# Patient Record
Sex: FEMALE | Race: WHITE | NOT HISPANIC OR LATINO | Employment: FULL TIME | URBAN - METROPOLITAN AREA
[De-identification: names, ages, dates, MRNs, and addresses within clinical notes are randomized per-mention and may not be internally consistent; named-entity substitution may affect disease eponyms.]

---

## 2019-04-17 ENCOUNTER — HOSPITAL ENCOUNTER (OUTPATIENT)
Dept: PREADMISSION TESTING | Facility: HOSPITAL | Age: 54
Discharge: HOME | End: 2019-04-17
Attending: PODIATRIST | Admitting: PODIATRIST

## 2019-04-17 LAB
ALBUMIN SERPL-MCNC: 4 G/DL (ref 3.5–4.8)
ALBUMIN/GLOB SERPL: 1.4 {RATIO} (ref 1–1.7)
ALP SERPL-CCNC: 107 IU/L (ref 32–91)
ALT SERPL-CCNC: 28 IU/L (ref 14–54)
ANION GAP SERPL CALC-SCNC: 16.4 MMOL/L (ref 10–20)
AST SERPL-CCNC: 32 IU/L (ref 15–41)
BASOPHILS # BLD AUTO: 0.1 10*3/UL (ref 0–0.2)
BASOPHILS NFR BLD AUTO: 1 % (ref 0–2)
BILIRUB SERPL-MCNC: 0.6 MG/DL (ref 0.3–1.2)
BUN SERPL-MCNC: 16 MG/DL (ref 8–20)
BUN/CREAT SERPL: 13.3 (ref 5.4–26.2)
CALCIUM SERPL-MCNC: 9.7 MG/DL (ref 8.9–10.3)
CHLORIDE SERPL-SCNC: 104 MMOL/L (ref 101–111)
CONV CO2: 23 MMOL/L (ref 22–32)
CONV TOTAL PROTEIN: 6.9 G/DL (ref 6.1–7.9)
CREAT UR-MCNC: 1.2 MG/DL (ref 0.4–1)
DIFFERENTIAL METHOD BLD: (no result)
EOSINOPHIL # BLD AUTO: 0 10*3/UL (ref 0–0.3)
EOSINOPHIL # BLD AUTO: 1 % (ref 0–3)
ERYTHROCYTE [DISTWIDTH] IN BLOOD BY AUTOMATED COUNT: 14.6 % (ref 11.5–14.5)
GLOBULIN UR ELPH-MCNC: 2.9 G/DL (ref 2.5–3.8)
GLUCOSE SERPL-MCNC: 104 MG/DL (ref 65–99)
HCT VFR BLD AUTO: 38.1 % (ref 35–49)
HGB BLD-MCNC: 12.6 G/DL (ref 12–15)
LYMPHOCYTES # BLD AUTO: 2.5 10*3/UL (ref 0.8–4.8)
LYMPHOCYTES NFR BLD AUTO: 29 % (ref 18–42)
MCH RBC QN AUTO: 31.6 PG (ref 26–32)
MCHC RBC AUTO-ENTMCNC: 32.9 G/DL (ref 32–36)
MCV RBC AUTO: 95.8 FL (ref 80–94)
MONOCYTES # BLD AUTO: 0.8 10*3/UL (ref 0.1–1.3)
MONOCYTES NFR BLD AUTO: 9 % (ref 2–11)
NEUTROPHILS # BLD AUTO: 5.3 10*3/UL (ref 2.3–8.6)
NEUTROPHILS NFR BLD AUTO: 60 % (ref 50–75)
NRBC BLD AUTO-RTO: 0 /100{WBCS}
NRBC/RBC NFR BLD MANUAL: 0 10*3/UL
PLATELET # BLD AUTO: 332 10*3/UL (ref 150–450)
PMV BLD AUTO: 8.4 FL (ref 7.4–10.4)
POTASSIUM SERPL-SCNC: 4.4 MMOL/L (ref 3.6–5.1)
RBC # BLD AUTO: 3.98 10*6/UL (ref 4–5.4)
SODIUM SERPL-SCNC: 139 MMOL/L (ref 136–144)
WBC # BLD AUTO: 8.8 10*3/UL (ref 4.5–11.5)

## 2019-04-19 ENCOUNTER — HOSPITAL ENCOUNTER (OUTPATIENT)
Dept: PREOP | Facility: HOSPITAL | Age: 54
Setting detail: HOSPITAL OUTPATIENT SURGERY
Discharge: HOME | End: 2019-04-19
Attending: PODIATRIST | Admitting: PODIATRIST

## 2019-04-22 LAB
BLD COMPONENT TYPE: NORMAL
BLD COMPONENT TYPE: NORMAL
BPU ID: NORMAL
CONV PRODUCT 1 STATUS: NORMAL
NUM BPU REQUESTED: 1
TRANS STATUS: NORMAL
UNIT DIVISION: 0

## 2020-11-16 PROCEDURE — U0003 INFECTIOUS AGENT DETECTION BY NUCLEIC ACID (DNA OR RNA); SEVERE ACUTE RESPIRATORY SYNDROME CORONAVIRUS 2 (SARS-COV-2) (CORONAVIRUS DISEASE [COVID-19]), AMPLIFIED PROBE TECHNIQUE, MAKING USE OF HIGH THROUGHPUT TECHNOLOGIES AS DESCRIBED BY CMS-2020-01-R: HCPCS | Performed by: NURSE PRACTITIONER

## 2020-12-28 ENCOUNTER — HOSPITAL ENCOUNTER (INPATIENT)
Facility: HOSPITAL | Age: 55
LOS: 2 days | Discharge: HOME | End: 2020-12-30
Attending: EMERGENCY MEDICINE | Admitting: INTERNAL MEDICINE

## 2020-12-28 ENCOUNTER — APPOINTMENT (OUTPATIENT)
Dept: CT IMAGING | Facility: HOSPITAL | Age: 55
End: 2020-12-28

## 2020-12-28 DIAGNOSIS — R06.00 DYSPNEA, UNSPECIFIED TYPE: Primary | ICD-10-CM

## 2020-12-28 DIAGNOSIS — I26.99 ACUTE PULMONARY EMBOLISM WITHOUT ACUTE COR PULMONALE, UNSPECIFIED PULMONARY EMBOLISM TYPE (HCC): ICD-10-CM

## 2020-12-28 PROBLEM — E66.01 MORBID OBESITY: Chronic | Status: ACTIVE | Noted: 2020-12-28

## 2020-12-28 PROBLEM — J96.01 ACUTE RESPIRATORY FAILURE WITH HYPOXIA: Status: ACTIVE | Noted: 2020-12-28

## 2020-12-28 LAB
ANION GAP SERPL CALCULATED.3IONS-SCNC: 14 MMOL/L (ref 5–15)
ARTERIAL PATENCY WRIST A: POSITIVE
ATMOSPHERIC PRESS: ABNORMAL MM[HG]
BASE EXCESS BLDA CALC-SCNC: 0 MMOL/L (ref 0–3)
BASOPHILS # BLD AUTO: 0.1 10*3/MM3 (ref 0–0.2)
BASOPHILS NFR BLD AUTO: 0.9 % (ref 0–1.5)
BDY SITE: ABNORMAL
BUN SERPL-MCNC: 21 MG/DL (ref 6–20)
BUN/CREAT SERPL: 17.6 (ref 7–25)
CALCIUM SPEC-SCNC: 9.8 MG/DL (ref 8.6–10.5)
CHLORIDE SERPL-SCNC: 96 MMOL/L (ref 98–107)
CO2 BLDA-SCNC: 25.1 MMOL/L (ref 22–29)
CO2 SERPL-SCNC: 25 MMOL/L (ref 22–29)
CREAT SERPL-MCNC: 1.19 MG/DL (ref 0.57–1)
DEPRECATED RDW RBC AUTO: 45.9 FL (ref 37–54)
EOSINOPHIL # BLD AUTO: 0.1 10*3/MM3 (ref 0–0.4)
EOSINOPHIL NFR BLD AUTO: 1.2 % (ref 0.3–6.2)
ERYTHROCYTE [DISTWIDTH] IN BLOOD BY AUTOMATED COUNT: 14.4 % (ref 12.3–15.4)
GFR SERPL CREATININE-BSD FRML MDRD: 47 ML/MIN/1.73
GLUCOSE SERPL-MCNC: 108 MG/DL (ref 65–99)
HCO3 BLDA-SCNC: 24 MMOL/L (ref 21–28)
HCT VFR BLD AUTO: 40.7 % (ref 34–46.6)
HEMODILUTION: NO
HGB BLD-MCNC: 13.6 G/DL (ref 12–15.9)
HOLD SPECIMEN: NORMAL
INHALED O2 CONCENTRATION: 28 %
LYMPHOCYTES # BLD AUTO: 2.9 10*3/MM3 (ref 0.7–3.1)
LYMPHOCYTES NFR BLD AUTO: 26.5 % (ref 19.6–45.3)
MCH RBC QN AUTO: 30.8 PG (ref 26.6–33)
MCHC RBC AUTO-ENTMCNC: 33.3 G/DL (ref 31.5–35.7)
MCV RBC AUTO: 92.4 FL (ref 79–97)
MODALITY: ABNORMAL
MONOCYTES # BLD AUTO: 0.8 10*3/MM3 (ref 0.1–0.9)
MONOCYTES NFR BLD AUTO: 7.6 % (ref 5–12)
NEUTROPHILS NFR BLD AUTO: 6.9 10*3/MM3 (ref 1.7–7)
NEUTROPHILS NFR BLD AUTO: 63.8 % (ref 42.7–76)
NRBC BLD AUTO-RTO: 0.1 /100 WBC (ref 0–0.2)
NT-PROBNP SERPL-MCNC: 2895 PG/ML (ref 0–900)
PCO2 BLDA: 36.3 MM HG (ref 35–48)
PH BLDA: 7.43 PH UNITS (ref 7.35–7.45)
PLATELET # BLD AUTO: 289 10*3/MM3 (ref 140–450)
PMV BLD AUTO: 8 FL (ref 6–12)
PO2 BLDA: 73.6 MM HG (ref 83–108)
POTASSIUM SERPL-SCNC: 4.5 MMOL/L (ref 3.5–5.2)
RBC # BLD AUTO: 4.4 10*6/MM3 (ref 3.77–5.28)
SAO2 % BLDCOA: 95.1 % (ref 94–98)
SODIUM SERPL-SCNC: 135 MMOL/L (ref 136–145)
TROPONIN T SERPL-MCNC: <0.01 NG/ML (ref 0–0.03)
TROPONIN T SERPL-MCNC: <0.01 NG/ML (ref 0–0.03)
WBC # BLD AUTO: 10.8 10*3/MM3 (ref 3.4–10.8)
WHOLE BLOOD HOLD SPECIMEN: NORMAL

## 2020-12-28 PROCEDURE — 84484 ASSAY OF TROPONIN QUANT: CPT | Performed by: NURSE PRACTITIONER

## 2020-12-28 PROCEDURE — 36600 WITHDRAWAL OF ARTERIAL BLOOD: CPT

## 2020-12-28 PROCEDURE — 25010000002 ENOXAPARIN PER 10 MG: Performed by: EMERGENCY MEDICINE

## 2020-12-28 PROCEDURE — 0 IOPAMIDOL PER 1 ML: Performed by: EMERGENCY MEDICINE

## 2020-12-28 PROCEDURE — 93005 ELECTROCARDIOGRAM TRACING: CPT | Performed by: EMERGENCY MEDICINE

## 2020-12-28 PROCEDURE — 94799 UNLISTED PULMONARY SVC/PX: CPT

## 2020-12-28 PROCEDURE — 83880 ASSAY OF NATRIURETIC PEPTIDE: CPT | Performed by: EMERGENCY MEDICINE

## 2020-12-28 PROCEDURE — 84484 ASSAY OF TROPONIN QUANT: CPT | Performed by: EMERGENCY MEDICINE

## 2020-12-28 PROCEDURE — 99284 EMERGENCY DEPT VISIT MOD MDM: CPT

## 2020-12-28 PROCEDURE — 94640 AIRWAY INHALATION TREATMENT: CPT

## 2020-12-28 PROCEDURE — 82803 BLOOD GASES ANY COMBINATION: CPT

## 2020-12-28 PROCEDURE — 71275 CT ANGIOGRAPHY CHEST: CPT

## 2020-12-28 PROCEDURE — 80048 BASIC METABOLIC PNL TOTAL CA: CPT | Performed by: EMERGENCY MEDICINE

## 2020-12-28 PROCEDURE — 94760 N-INVAS EAR/PLS OXIMETRY 1: CPT

## 2020-12-28 PROCEDURE — 85025 COMPLETE CBC W/AUTO DIFF WBC: CPT | Performed by: EMERGENCY MEDICINE

## 2020-12-28 PROCEDURE — 99223 1ST HOSP IP/OBS HIGH 75: CPT | Performed by: INTERNAL MEDICINE

## 2020-12-28 RX ORDER — ACETAMINOPHEN 160 MG/5ML
650 SOLUTION ORAL EVERY 4 HOURS PRN
Status: DISCONTINUED | OUTPATIENT
Start: 2020-12-28 | End: 2020-12-30 | Stop reason: HOSPADM

## 2020-12-28 RX ORDER — METOPROLOL TARTRATE 50 MG/1
50 TABLET, FILM COATED ORAL EVERY 12 HOURS SCHEDULED
Status: DISCONTINUED | OUTPATIENT
Start: 2020-12-28 | End: 2020-12-30 | Stop reason: HOSPADM

## 2020-12-28 RX ORDER — ONDANSETRON 2 MG/ML
4 INJECTION INTRAMUSCULAR; INTRAVENOUS EVERY 6 HOURS PRN
Status: DISCONTINUED | OUTPATIENT
Start: 2020-12-28 | End: 2020-12-30 | Stop reason: HOSPADM

## 2020-12-28 RX ORDER — ALBUTEROL SULFATE 90 UG/1
2 AEROSOL, METERED RESPIRATORY (INHALATION) EVERY 4 HOURS PRN
COMMUNITY

## 2020-12-28 RX ORDER — SODIUM CHLORIDE 0.9 % (FLUSH) 0.9 %
10 SYRINGE (ML) INJECTION AS NEEDED
Status: DISCONTINUED | OUTPATIENT
Start: 2020-12-28 | End: 2020-12-30 | Stop reason: HOSPADM

## 2020-12-28 RX ORDER — NITROGLYCERIN 0.4 MG/1
0.4 TABLET SUBLINGUAL
Status: DISCONTINUED | OUTPATIENT
Start: 2020-12-28 | End: 2020-12-30 | Stop reason: HOSPADM

## 2020-12-28 RX ORDER — CHOLECALCIFEROL (VITAMIN D3) 125 MCG
5 CAPSULE ORAL NIGHTLY PRN
Status: DISCONTINUED | OUTPATIENT
Start: 2020-12-28 | End: 2020-12-30 | Stop reason: HOSPADM

## 2020-12-28 RX ORDER — FLUOXETINE HYDROCHLORIDE 20 MG/1
40 CAPSULE ORAL DAILY
Status: DISCONTINUED | OUTPATIENT
Start: 2020-12-28 | End: 2020-12-30 | Stop reason: HOSPADM

## 2020-12-28 RX ORDER — MAGNESIUM SULFATE 1 G/100ML
1 INJECTION INTRAVENOUS AS NEEDED
Status: DISCONTINUED | OUTPATIENT
Start: 2020-12-28 | End: 2020-12-30 | Stop reason: HOSPADM

## 2020-12-28 RX ORDER — ALUMINA, MAGNESIA, AND SIMETHICONE 2400; 2400; 240 MG/30ML; MG/30ML; MG/30ML
15 SUSPENSION ORAL EVERY 6 HOURS PRN
Status: DISCONTINUED | OUTPATIENT
Start: 2020-12-28 | End: 2020-12-30 | Stop reason: HOSPADM

## 2020-12-28 RX ORDER — MAGNESIUM SULFATE HEPTAHYDRATE 40 MG/ML
2 INJECTION, SOLUTION INTRAVENOUS AS NEEDED
Status: DISCONTINUED | OUTPATIENT
Start: 2020-12-28 | End: 2020-12-30 | Stop reason: HOSPADM

## 2020-12-28 RX ORDER — AMLODIPINE, VALSARTAN AND HYDROCHLOROTHIAZIDE 10; 320; 25 MG/1; MG/1; MG/1
1 TABLET ORAL DAILY
COMMUNITY
End: 2021-09-10 | Stop reason: HOSPADM

## 2020-12-28 RX ORDER — ACETAMINOPHEN 325 MG/1
650 TABLET ORAL EVERY 4 HOURS PRN
Status: DISCONTINUED | OUTPATIENT
Start: 2020-12-28 | End: 2020-12-30 | Stop reason: HOSPADM

## 2020-12-28 RX ORDER — ONDANSETRON 4 MG/1
4 TABLET, FILM COATED ORAL EVERY 6 HOURS PRN
Status: DISCONTINUED | OUTPATIENT
Start: 2020-12-28 | End: 2020-12-30 | Stop reason: HOSPADM

## 2020-12-28 RX ORDER — ALBUTEROL SULFATE 90 UG/1
2 AEROSOL, METERED RESPIRATORY (INHALATION) EVERY 4 HOURS PRN
Status: DISCONTINUED | OUTPATIENT
Start: 2020-12-28 | End: 2020-12-30 | Stop reason: HOSPADM

## 2020-12-28 RX ORDER — POTASSIUM CHLORIDE 20 MEQ/1
40 TABLET, EXTENDED RELEASE ORAL AS NEEDED
Status: DISCONTINUED | OUTPATIENT
Start: 2020-12-28 | End: 2020-12-30 | Stop reason: HOSPADM

## 2020-12-28 RX ORDER — MONTELUKAST SODIUM 10 MG/1
10 TABLET ORAL NIGHTLY
Status: DISCONTINUED | OUTPATIENT
Start: 2020-12-28 | End: 2020-12-30 | Stop reason: HOSPADM

## 2020-12-28 RX ORDER — BISACODYL 10 MG
10 SUPPOSITORY, RECTAL RECTAL DAILY PRN
Status: DISCONTINUED | OUTPATIENT
Start: 2020-12-28 | End: 2020-12-30 | Stop reason: HOSPADM

## 2020-12-28 RX ORDER — LEVOTHYROXINE SODIUM 112 UG/1
112 TABLET ORAL
Status: DISCONTINUED | OUTPATIENT
Start: 2020-12-29 | End: 2020-12-30 | Stop reason: HOSPADM

## 2020-12-28 RX ORDER — ACETAMINOPHEN 650 MG/1
650 SUPPOSITORY RECTAL EVERY 4 HOURS PRN
Status: DISCONTINUED | OUTPATIENT
Start: 2020-12-28 | End: 2020-12-30 | Stop reason: HOSPADM

## 2020-12-28 RX ORDER — SODIUM CHLORIDE 0.9 % (FLUSH) 0.9 %
10 SYRINGE (ML) INJECTION EVERY 12 HOURS SCHEDULED
Status: DISCONTINUED | OUTPATIENT
Start: 2020-12-28 | End: 2020-12-30 | Stop reason: HOSPADM

## 2020-12-28 RX ORDER — BUDESONIDE AND FORMOTEROL FUMARATE DIHYDRATE 160; 4.5 UG/1; UG/1
2 AEROSOL RESPIRATORY (INHALATION)
Status: DISCONTINUED | OUTPATIENT
Start: 2020-12-28 | End: 2020-12-30 | Stop reason: HOSPADM

## 2020-12-28 RX ADMIN — BUDESONIDE AND FORMOTEROL FUMARATE DIHYDRATE 2 PUFF: 160; 4.5 AEROSOL RESPIRATORY (INHALATION) at 18:24

## 2020-12-28 RX ADMIN — ENOXAPARIN SODIUM 120 MG: 120 INJECTION SUBCUTANEOUS at 15:02

## 2020-12-28 RX ADMIN — FLUOXETINE 40 MG: 20 CAPSULE ORAL at 22:11

## 2020-12-28 RX ADMIN — Medication 10 ML: at 22:06

## 2020-12-28 RX ADMIN — METOPROLOL TARTRATE 50 MG: 50 TABLET, FILM COATED ORAL at 22:00

## 2020-12-28 RX ADMIN — MONTELUKAST 10 MG: 10 TABLET, FILM COATED ORAL at 22:00

## 2020-12-28 RX ADMIN — IOPAMIDOL 100 ML: 755 INJECTION, SOLUTION INTRAVENOUS at 13:07

## 2020-12-29 ENCOUNTER — APPOINTMENT (OUTPATIENT)
Dept: CARDIOLOGY | Facility: HOSPITAL | Age: 55
End: 2020-12-29

## 2020-12-29 LAB
ANION GAP SERPL CALCULATED.3IONS-SCNC: 12 MMOL/L (ref 5–15)
BASOPHILS # BLD AUTO: 0 10*3/MM3 (ref 0–0.2)
BASOPHILS NFR BLD AUTO: 0.5 % (ref 0–1.5)
BH CV ECHO MEAS - % IVS THICK: 34.7 %
BH CV ECHO MEAS - % LVPW THICK: 25.2 %
BH CV ECHO MEAS - ACS: 1.9 CM
BH CV ECHO MEAS - AO MAX PG (FULL): 3.7 MMHG
BH CV ECHO MEAS - AO MAX PG: 10.7 MMHG
BH CV ECHO MEAS - AO MEAN PG (FULL): 2.8 MMHG
BH CV ECHO MEAS - AO MEAN PG: 6.1 MMHG
BH CV ECHO MEAS - AO ROOT AREA (BSA CORRECTED): 1.2
BH CV ECHO MEAS - AO ROOT AREA: 5.6 CM^2
BH CV ECHO MEAS - AO ROOT DIAM: 2.7 CM
BH CV ECHO MEAS - AO V2 MAX: 163.4 CM/SEC
BH CV ECHO MEAS - AO V2 MEAN: 117.9 CM/SEC
BH CV ECHO MEAS - AO V2 VTI: 29.8 CM
BH CV ECHO MEAS - AVA(I,A): 2.7 CM^2
BH CV ECHO MEAS - AVA(I,D): 2.7 CM^2
BH CV ECHO MEAS - AVA(V,A): 2.6 CM^2
BH CV ECHO MEAS - AVA(V,D): 2.6 CM^2
BH CV ECHO MEAS - BSA(HAYCOCK): 2.4 M^2
BH CV ECHO MEAS - BSA: 2.2 M^2
BH CV ECHO MEAS - BZI_BMI: 42.4 KILOGRAMS/M^2
BH CV ECHO MEAS - BZI_METRIC_HEIGHT: 167.6 CM
BH CV ECHO MEAS - BZI_METRIC_WEIGHT: 119.3 KG
BH CV ECHO MEAS - EDV(CUBED): 121.3 ML
BH CV ECHO MEAS - EDV(MOD-SP4): 62.7 ML
BH CV ECHO MEAS - EDV(TEICH): 115.6 ML
BH CV ECHO MEAS - EF(CUBED): 77.7 %
BH CV ECHO MEAS - EF(MOD-BP): 69 %
BH CV ECHO MEAS - EF(MOD-SP4): 78.7 %
BH CV ECHO MEAS - EF(TEICH): 69.7 %
BH CV ECHO MEAS - ESV(CUBED): 27 ML
BH CV ECHO MEAS - ESV(MOD-SP4): 13.4 ML
BH CV ECHO MEAS - ESV(TEICH): 35 ML
BH CV ECHO MEAS - FS: 39.4 %
BH CV ECHO MEAS - IVS/LVPW: 1.1
BH CV ECHO MEAS - IVSD: 1.4 CM
BH CV ECHO MEAS - IVSS: 1.9 CM
BH CV ECHO MEAS - LA DIMENSION(2D): 4.4 CM
BH CV ECHO MEAS - LV DIASTOLIC VOL/BSA (35-75): 27.9 ML/M^2
BH CV ECHO MEAS - LV MASS(C)D: 280.5 GRAMS
BH CV ECHO MEAS - LV MASS(C)DI: 124.8 GRAMS/M^2
BH CV ECHO MEAS - LV MASS(C)S: 216.2 GRAMS
BH CV ECHO MEAS - LV MASS(C)SI: 96.2 GRAMS/M^2
BH CV ECHO MEAS - LV MAX PG: 7 MMHG
BH CV ECHO MEAS - LV MEAN PG: 3.3 MMHG
BH CV ECHO MEAS - LV SYSTOLIC VOL/BSA (12-30): 6 ML/M^2
BH CV ECHO MEAS - LV V1 MAX: 131.9 CM/SEC
BH CV ECHO MEAS - LV V1 MEAN: 84 CM/SEC
BH CV ECHO MEAS - LV V1 VTI: 25.9 CM
BH CV ECHO MEAS - LVIDD: 5 CM
BH CV ECHO MEAS - LVIDS: 3 CM
BH CV ECHO MEAS - LVOT AREA: 3.2 CM^2
BH CV ECHO MEAS - LVOT DIAM: 2 CM
BH CV ECHO MEAS - LVPWD: 1.3 CM
BH CV ECHO MEAS - LVPWS: 1.6 CM
BH CV ECHO MEAS - MV A MAX VEL: 93.2 CM/SEC
BH CV ECHO MEAS - MV DEC SLOPE: 256.3 CM/SEC^2
BH CV ECHO MEAS - MV DEC TIME: 0.29 SEC
BH CV ECHO MEAS - MV E MAX VEL: 73.6 CM/SEC
BH CV ECHO MEAS - MV E/A: 0.79
BH CV ECHO MEAS - MV MAX PG: 3.5 MMHG
BH CV ECHO MEAS - MV MEAN PG: 1.3 MMHG
BH CV ECHO MEAS - MV V2 MAX: 93.2 CM/SEC
BH CV ECHO MEAS - MV V2 MEAN: 54 CM/SEC
BH CV ECHO MEAS - MV V2 VTI: 23.2 CM
BH CV ECHO MEAS - MVA(VTI): 3.5 CM^2
BH CV ECHO MEAS - PA ACC TIME: 0.06 SEC
BH CV ECHO MEAS - PA MAX PG (FULL): 1.9 MMHG
BH CV ECHO MEAS - PA MAX PG: 3.1 MMHG
BH CV ECHO MEAS - PA MEAN PG (FULL): 0.83 MMHG
BH CV ECHO MEAS - PA MEAN PG: 1.5 MMHG
BH CV ECHO MEAS - PA PR(ACCEL): 54.1 MMHG
BH CV ECHO MEAS - PA V2 MAX: 88.1 CM/SEC
BH CV ECHO MEAS - PA V2 MEAN: 57.6 CM/SEC
BH CV ECHO MEAS - PA V2 VTI: 15.5 CM
BH CV ECHO MEAS - PULM A REVS DUR: 0.1 SEC
BH CV ECHO MEAS - PULM A REVS VEL: 31.8 CM/SEC
BH CV ECHO MEAS - PULM DIAS VEL: 38.1 CM/SEC
BH CV ECHO MEAS - PULM S/D: 1.2
BH CV ECHO MEAS - PULM SYS VEL: 47 CM/SEC
BH CV ECHO MEAS - PVA(I,A): 3.5 CM^2
BH CV ECHO MEAS - PVA(I,D): 3.5 CM^2
BH CV ECHO MEAS - PVA(V,A): 3 CM^2
BH CV ECHO MEAS - PVA(V,D): 3 CM^2
BH CV ECHO MEAS - QP/QS: 0.67
BH CV ECHO MEAS - RAP SYSTOLE: 8 MMHG
BH CV ECHO MEAS - RV MAX PG: 1.2 MMHG
BH CV ECHO MEAS - RV MEAN PG: 0.66 MMHG
BH CV ECHO MEAS - RV V1 MAX: 54.3 CM/SEC
BH CV ECHO MEAS - RV V1 MEAN: 39 CM/SEC
BH CV ECHO MEAS - RV V1 VTI: 11.2 CM
BH CV ECHO MEAS - RVDD: 3.1 CM
BH CV ECHO MEAS - RVOT AREA: 4.9 CM^2
BH CV ECHO MEAS - RVOT DIAM: 2.5 CM
BH CV ECHO MEAS - RVSP: 37.2 MMHG
BH CV ECHO MEAS - SI(AO): 74.7 ML/M^2
BH CV ECHO MEAS - SI(CUBED): 42 ML/M^2
BH CV ECHO MEAS - SI(LVOT): 36.4 ML/M^2
BH CV ECHO MEAS - SI(MOD-SP4): 22 ML/M^2
BH CV ECHO MEAS - SI(TEICH): 35.8 ML/M^2
BH CV ECHO MEAS - SV(AO): 167.9 ML
BH CV ECHO MEAS - SV(CUBED): 94.3 ML
BH CV ECHO MEAS - SV(LVOT): 81.8 ML
BH CV ECHO MEAS - SV(MOD-SP4): 49.4 ML
BH CV ECHO MEAS - SV(RVOT): 54.7 ML
BH CV ECHO MEAS - SV(TEICH): 80.5 ML
BH CV ECHO MEAS - TR MAX VEL: 268.6 CM/SEC
BUN SERPL-MCNC: 17 MG/DL (ref 6–20)
BUN/CREAT SERPL: 16.7 (ref 7–25)
CALCIUM SPEC-SCNC: 9.4 MG/DL (ref 8.6–10.5)
CHLORIDE SERPL-SCNC: 100 MMOL/L (ref 98–107)
CO2 SERPL-SCNC: 26 MMOL/L (ref 22–29)
CREAT SERPL-MCNC: 1.02 MG/DL (ref 0.57–1)
DEPRECATED RDW RBC AUTO: 47.7 FL (ref 37–54)
EOSINOPHIL # BLD AUTO: 0.2 10*3/MM3 (ref 0–0.4)
EOSINOPHIL NFR BLD AUTO: 1.8 % (ref 0.3–6.2)
ERYTHROCYTE [DISTWIDTH] IN BLOOD BY AUTOMATED COUNT: 14.7 % (ref 12.3–15.4)
GFR SERPL CREATININE-BSD FRML MDRD: 56 ML/MIN/1.73
GLUCOSE SERPL-MCNC: 102 MG/DL (ref 65–99)
HCT VFR BLD AUTO: 37.7 % (ref 34–46.6)
HGB BLD-MCNC: 12.5 G/DL (ref 12–15.9)
LYMPHOCYTES # BLD AUTO: 2.7 10*3/MM3 (ref 0.7–3.1)
LYMPHOCYTES NFR BLD AUTO: 31.8 % (ref 19.6–45.3)
MAGNESIUM SERPL-MCNC: 2.2 MG/DL (ref 1.6–2.6)
MAXIMAL PREDICTED HEART RATE: 163 BPM
MCH RBC QN AUTO: 30.9 PG (ref 26.6–33)
MCHC RBC AUTO-ENTMCNC: 33.2 G/DL (ref 31.5–35.7)
MCV RBC AUTO: 93.1 FL (ref 79–97)
MONOCYTES # BLD AUTO: 0.8 10*3/MM3 (ref 0.1–0.9)
MONOCYTES NFR BLD AUTO: 10 % (ref 5–12)
NEUTROPHILS NFR BLD AUTO: 4.7 10*3/MM3 (ref 1.7–7)
NEUTROPHILS NFR BLD AUTO: 55.9 % (ref 42.7–76)
NRBC BLD AUTO-RTO: 0.2 /100 WBC (ref 0–0.2)
PLATELET # BLD AUTO: 247 10*3/MM3 (ref 140–450)
PMV BLD AUTO: 8.7 FL (ref 6–12)
POTASSIUM SERPL-SCNC: 4.1 MMOL/L (ref 3.5–5.2)
RBC # BLD AUTO: 4.05 10*6/MM3 (ref 3.77–5.28)
SARS-COV-2 ORF1AB RESP QL NAA+PROBE: NOT DETECTED
SODIUM SERPL-SCNC: 138 MMOL/L (ref 136–145)
STRESS TARGET HR: 139 BPM
TROPONIN T SERPL-MCNC: 0.01 NG/ML (ref 0–0.03)
WBC # BLD AUTO: 8.5 10*3/MM3 (ref 3.4–10.8)

## 2020-12-29 PROCEDURE — 25010000002 ENOXAPARIN PER 10 MG: Performed by: NURSE PRACTITIONER

## 2020-12-29 PROCEDURE — 93306 TTE W/DOPPLER COMPLETE: CPT | Performed by: INTERNAL MEDICINE

## 2020-12-29 PROCEDURE — 94799 UNLISTED PULMONARY SVC/PX: CPT

## 2020-12-29 PROCEDURE — 83735 ASSAY OF MAGNESIUM: CPT | Performed by: NURSE PRACTITIONER

## 2020-12-29 PROCEDURE — 99233 SBSQ HOSP IP/OBS HIGH 50: CPT | Performed by: INTERNAL MEDICINE

## 2020-12-29 PROCEDURE — U0004 COV-19 TEST NON-CDC HGH THRU: HCPCS | Performed by: INTERNAL MEDICINE

## 2020-12-29 PROCEDURE — 85025 COMPLETE CBC W/AUTO DIFF WBC: CPT | Performed by: NURSE PRACTITIONER

## 2020-12-29 PROCEDURE — 80048 BASIC METABOLIC PNL TOTAL CA: CPT | Performed by: NURSE PRACTITIONER

## 2020-12-29 PROCEDURE — 93306 TTE W/DOPPLER COMPLETE: CPT

## 2020-12-29 RX ADMIN — Medication 10 ML: at 08:35

## 2020-12-29 RX ADMIN — ENOXAPARIN SODIUM 120 MG: 120 INJECTION SUBCUTANEOUS at 17:59

## 2020-12-29 RX ADMIN — LEVOTHYROXINE SODIUM 112 MCG: 0.11 TABLET ORAL at 05:14

## 2020-12-29 RX ADMIN — Medication 10 ML: at 20:44

## 2020-12-29 RX ADMIN — METOPROLOL TARTRATE 50 MG: 50 TABLET, FILM COATED ORAL at 08:36

## 2020-12-29 RX ADMIN — METOPROLOL TARTRATE 50 MG: 50 TABLET, FILM COATED ORAL at 20:44

## 2020-12-29 RX ADMIN — ENOXAPARIN SODIUM 120 MG: 120 INJECTION SUBCUTANEOUS at 05:14

## 2020-12-29 RX ADMIN — HYDROCHLOROTHIAZIDE: 25 TABLET ORAL at 08:35

## 2020-12-29 RX ADMIN — MONTELUKAST 10 MG: 10 TABLET, FILM COATED ORAL at 20:44

## 2020-12-29 RX ADMIN — FLUOXETINE 40 MG: 20 CAPSULE ORAL at 20:44

## 2020-12-29 RX ADMIN — BUDESONIDE AND FORMOTEROL FUMARATE DIHYDRATE 2 PUFF: 160; 4.5 AEROSOL RESPIRATORY (INHALATION) at 19:08

## 2020-12-29 RX ADMIN — BUDESONIDE AND FORMOTEROL FUMARATE DIHYDRATE 2 PUFF: 160; 4.5 AEROSOL RESPIRATORY (INHALATION) at 07:51

## 2020-12-30 ENCOUNTER — APPOINTMENT (OUTPATIENT)
Dept: CARDIOLOGY | Facility: HOSPITAL | Age: 55
End: 2020-12-30

## 2020-12-30 VITALS
HEART RATE: 58 BPM | OXYGEN SATURATION: 96 % | WEIGHT: 262.57 LBS | HEIGHT: 66 IN | TEMPERATURE: 97.7 F | DIASTOLIC BLOOD PRESSURE: 84 MMHG | RESPIRATION RATE: 16 BRPM | BODY MASS INDEX: 42.2 KG/M2 | SYSTOLIC BLOOD PRESSURE: 140 MMHG

## 2020-12-30 LAB
BH CV LOW VAS LEFT POPLITEAL SPONT: 1
BH CV LOWER VASCULAR LEFT COMMON FEMORAL AUGMENT: NORMAL
BH CV LOWER VASCULAR LEFT COMMON FEMORAL COMPETENT: NORMAL
BH CV LOWER VASCULAR LEFT COMMON FEMORAL COMPRESS: NORMAL
BH CV LOWER VASCULAR LEFT COMMON FEMORAL PHASIC: NORMAL
BH CV LOWER VASCULAR LEFT COMMON FEMORAL SPONT: NORMAL
BH CV LOWER VASCULAR LEFT DISTAL FEMORAL COMPRESS: NORMAL
BH CV LOWER VASCULAR LEFT GASTRONEMIUS COMPRESS: NORMAL
BH CV LOWER VASCULAR LEFT GREATER SAPH AK COMPRESS: NORMAL
BH CV LOWER VASCULAR LEFT GREATER SAPH BK COMPRESS: NORMAL
BH CV LOWER VASCULAR LEFT LESSER SAPH COMPRESS: NORMAL
BH CV LOWER VASCULAR LEFT MID FEMORAL AUGMENT: NORMAL
BH CV LOWER VASCULAR LEFT MID FEMORAL COMPETENT: NORMAL
BH CV LOWER VASCULAR LEFT MID FEMORAL COMPRESS: NORMAL
BH CV LOWER VASCULAR LEFT MID FEMORAL PHASIC: NORMAL
BH CV LOWER VASCULAR LEFT MID FEMORAL SPONT: NORMAL
BH CV LOWER VASCULAR LEFT PERONEAL COMPRESS: NORMAL
BH CV LOWER VASCULAR LEFT POPLITEAL AUGMENT: NORMAL
BH CV LOWER VASCULAR LEFT POPLITEAL COMPETENT: NORMAL
BH CV LOWER VASCULAR LEFT POPLITEAL COMPRESS: NORMAL
BH CV LOWER VASCULAR LEFT POPLITEAL PHASIC: NORMAL
BH CV LOWER VASCULAR LEFT POPLITEAL SPONT: NORMAL
BH CV LOWER VASCULAR LEFT POPLITEAL THROMBUS: NORMAL
BH CV LOWER VASCULAR LEFT POSTERIOR TIBIAL COMPRESS: NORMAL
BH CV LOWER VASCULAR LEFT PROXIMAL FEMORAL COMPRESS: NORMAL
BH CV LOWER VASCULAR LEFT SAPHENOFEMORAL JUNCTION COMPRESS: NORMAL
BH CV LOWER VASCULAR RIGHT COMMON FEMORAL AUGMENT: NORMAL
BH CV LOWER VASCULAR RIGHT COMMON FEMORAL COMPETENT: NORMAL
BH CV LOWER VASCULAR RIGHT COMMON FEMORAL COMPRESS: NORMAL
BH CV LOWER VASCULAR RIGHT COMMON FEMORAL PHASIC: NORMAL
BH CV LOWER VASCULAR RIGHT COMMON FEMORAL SPONT: NORMAL
BH CV LOWER VASCULAR RIGHT GASTRONEMIUS COMPRESS: NORMAL
BH CV LOWER VASCULAR RIGHT GREATER SAPH AK COMPRESS: NORMAL
BH CV LOWER VASCULAR RIGHT GREATER SAPH BK COMPRESS: NORMAL
BH CV LOWER VASCULAR RIGHT LESSER SAPH COMPRESS: NORMAL
BH CV LOWER VASCULAR RIGHT MID FEMORAL AUGMENT: NORMAL
BH CV LOWER VASCULAR RIGHT MID FEMORAL COMPETENT: NORMAL
BH CV LOWER VASCULAR RIGHT MID FEMORAL COMPRESS: NORMAL
BH CV LOWER VASCULAR RIGHT MID FEMORAL PHASIC: NORMAL
BH CV LOWER VASCULAR RIGHT MID FEMORAL SPONT: NORMAL
BH CV LOWER VASCULAR RIGHT PERONEAL COMPRESS: NORMAL
BH CV LOWER VASCULAR RIGHT POPLITEAL AUGMENT: NORMAL
BH CV LOWER VASCULAR RIGHT POPLITEAL COMPETENT: NORMAL
BH CV LOWER VASCULAR RIGHT POPLITEAL COMPRESS: NORMAL
BH CV LOWER VASCULAR RIGHT POPLITEAL PHASIC: NORMAL
BH CV LOWER VASCULAR RIGHT POPLITEAL SPONT: NORMAL
BH CV LOWER VASCULAR RIGHT POSTERIOR TIBIAL COMPRESS: NORMAL
BH CV LOWER VASCULAR RIGHT PROFUNDA FEMORAL COMPRESS: NORMAL
BH CV LOWER VASCULAR RIGHT PROXIMAL FEMORAL COMPRESS: NORMAL
BH CV LOWER VASCULAR RIGHT SAPHENOFEMORAL JUNCTION COMPRESS: NORMAL
MAGNESIUM SERPL-MCNC: 2.1 MG/DL (ref 1.6–2.6)
POTASSIUM SERPL-SCNC: 4 MMOL/L (ref 3.5–5.2)

## 2020-12-30 PROCEDURE — 83735 ASSAY OF MAGNESIUM: CPT | Performed by: NURSE PRACTITIONER

## 2020-12-30 PROCEDURE — 84132 ASSAY OF SERUM POTASSIUM: CPT | Performed by: NURSE PRACTITIONER

## 2020-12-30 PROCEDURE — 99239 HOSP IP/OBS DSCHRG MGMT >30: CPT | Performed by: INTERNAL MEDICINE

## 2020-12-30 PROCEDURE — 93970 EXTREMITY STUDY: CPT

## 2020-12-30 PROCEDURE — 94799 UNLISTED PULMONARY SVC/PX: CPT

## 2020-12-30 PROCEDURE — 25010000002 ENOXAPARIN PER 10 MG: Performed by: NURSE PRACTITIONER

## 2020-12-30 RX ORDER — RIVAROXABAN 15 MG-20MG
KIT ORAL
Qty: 60 TABLET | Refills: 3 | Status: SHIPPED | OUTPATIENT
Start: 2020-12-30 | End: 2021-07-26 | Stop reason: SDUPTHER

## 2020-12-30 RX ADMIN — METOPROLOL TARTRATE 50 MG: 50 TABLET, FILM COATED ORAL at 08:26

## 2020-12-30 RX ADMIN — HYDROCHLOROTHIAZIDE: 25 TABLET ORAL at 08:26

## 2020-12-30 RX ADMIN — ENOXAPARIN SODIUM 120 MG: 120 INJECTION SUBCUTANEOUS at 05:06

## 2020-12-30 RX ADMIN — LEVOTHYROXINE SODIUM 112 MCG: 0.11 TABLET ORAL at 05:06

## 2020-12-30 RX ADMIN — BUDESONIDE AND FORMOTEROL FUMARATE DIHYDRATE 2 PUFF: 160; 4.5 AEROSOL RESPIRATORY (INHALATION) at 08:00

## 2020-12-31 ENCOUNTER — READMISSION MANAGEMENT (OUTPATIENT)
Dept: CALL CENTER | Facility: HOSPITAL | Age: 55
End: 2020-12-31

## 2020-12-31 NOTE — OUTREACH NOTE
Prep Survey      Responses   [facility] patient discharged from?  [facility]   Is LACE score < 7 ?  No   Emergency Room discharge w/ pulse ox?  No   Eligibility  Readm Mgmt   Discharge diagnosis  Bilateral pulmonary embolism   Does the patient have one of the following disease processes/diagnoses(primary or secondary)?  Other   Does the patient have Home health ordered?  No   Is there a DME ordered?  No   Comments regarding appointments  Needs f/u scheduled   Medication alerts for this patient  start xarelto   Prep survey completed?  Yes          Lee Ann Yates RN

## 2021-01-02 LAB — QT INTERVAL: 424 MS

## 2021-01-05 ENCOUNTER — READMISSION MANAGEMENT (OUTPATIENT)
Dept: CALL CENTER | Facility: HOSPITAL | Age: 56
End: 2021-01-05

## 2021-01-05 NOTE — OUTREACH NOTE
Medical Week 1 Survey      Responses   [facility] patient discharged from?  [facility]   Does the patient have one of the following disease processes/diagnoses(primary or secondary)?  Other   Week 1 attempt successful?  No   Unsuccessful attempts  Attempt 1          Liane Taylor LPN

## 2021-01-08 ENCOUNTER — READMISSION MANAGEMENT (OUTPATIENT)
Dept: CALL CENTER | Facility: HOSPITAL | Age: 56
End: 2021-01-08

## 2021-01-08 NOTE — OUTREACH NOTE
Medical Week 1 Survey      Responses   [facility] [place] patient discharged from?  [facility]   Does the patient have one of the following disease processes/diagnoses(primary or secondary)?  Other   Week 1 attempt successful?  No   Unsuccessful attempts  Attempt 2          Liane Taylor LPN

## 2021-01-11 ENCOUNTER — READMISSION MANAGEMENT (OUTPATIENT)
Dept: CALL CENTER | Facility: HOSPITAL | Age: 56
End: 2021-01-11

## 2021-01-11 NOTE — PROGRESS NOTES
HEMATOLOGY ONCOLOGY CONSULTATION       Patient name: Danita Montiel  : 1965  MRN: [id number]  Primary Care Physician: Jaya Harper MD  Referring Physician: Jaya Harper MD  Reason For Consultation:   Appointment (Hematology)    History of Present Illness    Danita Montiel is 55 y.o. female who presented to our office on 21 for consultation regarding Pulmonary embolism and deep venous thrombosis.  Patient presented to the emergency room on 2020 with symptoms of increasing shortness of air of 1 week duration.  Patient was diagnosed with COVID-19 a month before the PE diagnosis.  CT scan was done and it showed extensive bilateral pulmonary emboli involving segmental and subsegmental pulmonary arteries.  There was a 1.4 cm noncalcified nodular density in the medial left lower lobe above the diaphragm which was new since .  CT chest PE protocol.    Patient also has a history of morbid obesity, asthma, GERD,Anxiety, hypertension, hypothyroidism, chronic kidney disease stage III.        · 2020 : 1. Extensive bilateral pulmonary emboli involving segmental and subsegmental pulmonary arteries. Pulmonary emboli are seen within all lobes of both lungs. No evidence of right heart strain. 1.4 cm noncalcified nodular density in the medial left lower lobe above the diaphragm, new since . This could represent focal pulmonary infarct or atelectasis. Consider short-term CT chest follow-up within 3-4 months to ensure improvement or resolution.  Questionable fibromuscular dysplasia of left renal artery, without high-grade stenosis. . Stable anterior mediastinal cyst.   · 2020: WBC 8.5, hemoglobin 12.5, platelets 247  · 2020: Doppler lower extremities: Acute left lower extremity DVT noted in the popliteal.  All other veins are normal.  · 2021: WBC 9.05, hemoglobin 12.7, platelets 315, MCV 93.9      Subjective   Subjective:  Danita Montiel presents to [facility] [place]  [facility] [place] for recent diagnosis of pulmonary embolism and left leg deep venous thrombosis.  Patient denies any immobilization, long road trips, flight units, hormone usage or any surgeries before her thrombosis diagnosis.  She was diagnosed with COVID-19 infection about a month before the diagnosis.  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  She denies any swelling in her left leg currently.  She does have lymphedema both extremities which she thinks is due to abdominal surgery when she was younger.  She had surgery for endometriosis.         Past Medical History:   Diagnosis Date   • Anxiety    • Asthma    • CKD (chronic kidney disease) stage 3, GFR 30-59 ml/min    • COVID-19 11/16/2020   • Endometriosis    • GERD (gastroesophageal reflux disease)    • Hypertension    • Hypothyroidism    • Obesity    • Ovarian cyst    Lymphedema more on the right side    Past Surgical History:   Procedure Laterality Date   • DIAGNOSTIC LAPAROSCOPY     • OVARIAN CYST DRAINAGE     Endometriosis      Current Outpatient Medications:   •  albuterol sulfate  (90 Base) MCG/ACT inhaler, Inhale 2 puffs Every 4 (Four) Hours As Needed for Wheezing., Disp: , Rfl:   •  amLODIPine-Valsartan-HCTZ (Exforge HCT) -25 MG tablet, Take 1 tablet by mouth Daily., Disp: , Rfl:   •  FLUoxetine (PROzac) 40 MG capsule, Take 40 mg by mouth Daily., Disp: , Rfl:   •  fluticasone-salmeterol (ADVAIR) 250-50 MCG/DOSE DISKUS, Inhale 1 puff 2 (two) times a day., Disp: , Rfl:   •  levothyroxine (SYNTHROID, LEVOTHROID) 112 MCG tablet, Take 112 mcg by mouth Daily., Disp: , Rfl:   •  metoprolol tartrate (LOPRESSOR) 50 MG tablet, Take 50 mg by mouth 2 (Two) Times a Day., Disp: , Rfl:   •  montelukast (SINGULAIR) 10 MG tablet, Take 10 mg by mouth Every Night., Disp: , Rfl:   •  Rivaroxaban (Xarelto Starter Pack) tablet therapy pack starter pack,  "Take one 15 mg tablet twice daily with food for 21 days.  Followed by one 20 mg tablet by mouth once daily with food. Take as directed, Disp: 60 tablet, Rfl: 3  •  traZODone (DESYREL) 50 MG tablet, Take 50 mg by mouth Every Night., Disp: , Rfl:     Allergies   Allergen Reactions   • Clarithromycin Itching       Family History   Problem Relation Age of Onset   • Heart disease Paternal Grandmother    • Heart disease Paternal Grandfather    • Stroke Other    Great grandfather had bladder cancer.  Grandfather had stomach cancer.  Mother had DVT.  Cancer-related family history is not on file.    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    Social History     Social History Narrative   • Not on file       Objective   Vital Signs:    Vitals:    01/14/21 1006   BP: 150/82   Pulse: 69   Resp: 18   Temp: 97.1 °F (36.2 °C)   Weight: 120 kg (265 lb)   Height: 167.6 cm (66\")   PainSc: 0-No pain     Body mass index is 42.77 kg/m².   Physical Exam  Vitals signs and nursing note reviewed.   Constitutional:       General: She is not in acute distress.     Appearance: Normal appearance. She is obese. She is not diaphoretic.   HENT:      Head: Normocephalic and atraumatic.   Eyes:      General: No scleral icterus.        Right eye: No discharge.         Left eye: No discharge.      Conjunctiva/sclera: Conjunctivae normal.   Neck:      Musculoskeletal: Normal range of motion and neck supple.      Thyroid: No thyromegaly.   Cardiovascular:      Rate and Rhythm: Normal rate and regular rhythm.      Heart sounds: Normal heart sounds. No friction rub. No gallop.    Pulmonary:      Effort: Pulmonary effort is normal. No " respiratory distress.      Breath sounds: No stridor. No wheezing.   Abdominal:      General: Bowel sounds are normal.      Palpations: Abdomen is soft. There is no mass.      Tenderness: There is no abdominal tenderness. There is no guarding or rebound.   Musculoskeletal: Normal range of motion.         General: No tenderness.   Lymphadenopathy:      Cervical: No cervical adenopathy.   Skin:     General: Skin is warm.      Findings: No erythema or rash.   Neurological:      General: No focal deficit present.      Mental Status: She is alert and oriented to person, place, and time.      Motor: No abnormal muscle tone.   Psychiatric:         Mood and Affect: Mood normal.         Behavior: Behavior normal.         Thought Content: Thought content normal.            Result Review :     Lab Results - Last 18 Months   Lab Units 01/14/21  1001 12/29/20  0408 12/28/20  1034   WBC 10*3/mm3 9.05 8.50 10.80   HEMOGLOBIN g/dL 12.7 12.5 13.6   HEMATOCRIT % 38.5 37.7 40.7   PLATELETS 10*3/mm3 315 247 289   MCV fL 93.9 93.1 92.4     Lab Results - Last 18 Months   Lab Units 12/30/20  0404 12/29/20  0408 12/28/20  1034   SODIUM mmol/L  --  138 135*   POTASSIUM mmol/L 4.0 4.1 4.5   CHLORIDE mmol/L  --  100 96*   CO2 mmol/L  --  26.0 25.0   BUN mg/dL  --  17 21*   CREATININE mg/dL  --  1.02* 1.19*   CALCIUM mg/dL  --  9.4 9.8   GLUCOSE mg/dL  --  102* 108*       Lab Results   Component Value Date    GLUCOSE 102 (H) 12/29/2020    BUN 17 12/29/2020    CREATININE 1.02 (H) 12/29/2020    EGFRIFNONA 56 (L) 12/29/2020    BCR 16.7 12/29/2020    K 4.0 12/30/2020    CO2 26.0 12/29/2020    CALCIUM 9.4 12/29/2020    ALBUMIN 4.0 04/17/2019    LABIL2 1.4 04/17/2019    AST 32 04/17/2019    ALT 28 04/17/2019     No results found for: IRON, TIBC, FERRITIN  No results found for: FOLATE  No results found for: OCCULTBLD  No results found for: RETICCTPCT  No results found for: ONJPTMJK35, TSH  No results found for: SPEP, UPEP  No results found for: LDH,  URICACID  No results found for: MOSHE, RF, SEDRATE  No results found for: FIBRINOGEN, HAPTOGLOBIN, DDIMER  No results found for: DDIMER  No results found for: PTT, INR  No results found for:   No results found for: CEA  No results found for:   No results found for: PSA  No results found for: DR1407   No results found for: AFP   No results found for: SEDRATE   Lab Results   Component Value Date    CALCIUM 9.4 12/29/2020                       CC Problem List  Visit Diagnosis  ROS  Review (Popup)  Health Maintenance  Quality  BestPractice  Medications  SmartSets  SnapShot Encounters  Media :23}   Problem List Items Addressed This Visit        Other    CKD (chronic kidney disease) stage 3, GFR 30-59 ml/min - Primary (Chronic)    Relevant Orders    CBC & Differential (Completed)          Assessment   1. Bilateral pulmonary embolism and left leg deep venous thrombosis: Patient had COVID-19 infection about a month before diagnosis.  Patient has a positive family history of DVT in her mother.  Started on Xarelto.  She is tolerating it well.  2. 1.4 cm lung nodule in the left lower lobe: Patient is a non-smoker.  This was diagnosed incidentally.  It needs follow-up.  3. Insomnia: Patient to try trazodone.        Plan:  1. We will order thrombophilia work-up  2. Will need a follow-up CT scan of the chest as well as reimaging Doppler of her left lower extremity in about 4 months.  3. Continue Xarelto at 20 mg daily.  4. Follow-up in about a month.        Orders Placed This Encounter   Procedures   • CBC & Differential     Standing Status:   Future     Number of Occurrences:   1        Follow Up   No follow-ups on file.      Thank you very much for providing the opportunity to participate in this patient’s care. Please do not hesitate to call if there are any other questions.    I have reviewed and confirmed the accuracy of the patient's history: Chief complaint, HPI, ROS, Subjective and Past Family Social  History as entered by the MA/LPN/RN. Von Vanegas MD 01/14/21      Electronically signed by Von Vanegas MD, 01/14/21, 10:59 AM EST.

## 2021-01-11 NOTE — OUTREACH NOTE
Medical Week 1 Survey      Responses   [facility] patient discharged from?  [facility]   Does the patient have one of the following disease processes/diagnoses(primary or secondary)?  Other   Week 1 attempt successful?  No   Unsuccessful attempts  Attempt 3          Liane Taylor LPN

## 2021-01-14 ENCOUNTER — LAB (OUTPATIENT)
Dept: LAB | Facility: HOSPITAL | Age: 56
End: 2021-01-14

## 2021-01-14 ENCOUNTER — CONSULT (OUTPATIENT)
Dept: ONCOLOGY | Facility: CLINIC | Age: 56
End: 2021-01-14

## 2021-01-14 VITALS
HEIGHT: 66 IN | DIASTOLIC BLOOD PRESSURE: 82 MMHG | BODY MASS INDEX: 42.59 KG/M2 | SYSTOLIC BLOOD PRESSURE: 150 MMHG | RESPIRATION RATE: 18 BRPM | TEMPERATURE: 97.1 F | HEART RATE: 69 BPM | WEIGHT: 265 LBS

## 2021-01-14 DIAGNOSIS — N18.30 STAGE 3 CHRONIC KIDNEY DISEASE, UNSPECIFIED WHETHER STAGE 3A OR 3B CKD (HCC): ICD-10-CM

## 2021-01-14 DIAGNOSIS — I26.99 PULMONARY EMBOLISM, BILATERAL (HCC): Primary | ICD-10-CM

## 2021-01-14 DIAGNOSIS — N18.30 STAGE 3 CHRONIC KIDNEY DISEASE, UNSPECIFIED WHETHER STAGE 3A OR 3B CKD (HCC): Chronic | ICD-10-CM

## 2021-01-14 LAB
BASOPHILS # BLD AUTO: 0.03 10*3/MM3 (ref 0–0.2)
BASOPHILS NFR BLD AUTO: 0.3 % (ref 0–1.5)
D DIMER PPP FEU-MCNC: 0.55 MG/L (FEU) (ref 0–0.59)
DEPRECATED RDW RBC AUTO: 46.4 FL (ref 37–54)
EOSINOPHIL # BLD AUTO: 0.2 10*3/MM3 (ref 0–0.4)
EOSINOPHIL NFR BLD AUTO: 2.2 % (ref 0.3–6.2)
ERYTHROCYTE [DISTWIDTH] IN BLOOD BY AUTOMATED COUNT: 13.8 % (ref 12.3–15.4)
HCT VFR BLD AUTO: 38.5 % (ref 34–46.6)
HCYS SERPL-MCNC: 12.4 UMOL/L (ref 0–15)
HGB BLD-MCNC: 12.7 G/DL (ref 12–15.9)
LYMPHOCYTES # BLD AUTO: 2.71 10*3/MM3 (ref 0.7–3.1)
LYMPHOCYTES NFR BLD AUTO: 29.9 % (ref 19.6–45.3)
MCH RBC QN AUTO: 31 PG (ref 26.6–33)
MCHC RBC AUTO-ENTMCNC: 33 G/DL (ref 31.5–35.7)
MCV RBC AUTO: 93.9 FL (ref 79–97)
MONOCYTES # BLD AUTO: 0.96 10*3/MM3 (ref 0.1–0.9)
MONOCYTES NFR BLD AUTO: 10.6 % (ref 5–12)
NEUTROPHILS NFR BLD AUTO: 5.15 10*3/MM3 (ref 1.7–7)
NEUTROPHILS NFR BLD AUTO: 57 % (ref 42.7–76)
PLATELET # BLD AUTO: 315 10*3/MM3 (ref 140–450)
PMV BLD AUTO: 10.4 FL (ref 6–12)
RBC # BLD AUTO: 4.1 10*6/MM3 (ref 3.77–5.28)
WBC # BLD AUTO: 9.05 10*3/MM3 (ref 3.4–10.8)

## 2021-01-14 PROCEDURE — 36415 COLL VENOUS BLD VENIPUNCTURE: CPT

## 2021-01-14 PROCEDURE — 85303 CLOT INHIBIT PROT C ACTIVITY: CPT

## 2021-01-14 PROCEDURE — 85240 CLOT FACTOR VIII AHG 1 STAGE: CPT

## 2021-01-14 PROCEDURE — 85379 FIBRIN DEGRADATION QUANT: CPT

## 2021-01-14 PROCEDURE — 85025 COMPLETE CBC W/AUTO DIFF WBC: CPT

## 2021-01-14 PROCEDURE — 81240 F2 GENE: CPT

## 2021-01-14 PROCEDURE — 85300 ANTITHROMBIN III ACTIVITY: CPT

## 2021-01-14 PROCEDURE — 81241 F5 GENE: CPT

## 2021-01-14 PROCEDURE — 99204 OFFICE O/P NEW MOD 45 MIN: CPT | Performed by: INTERNAL MEDICINE

## 2021-01-14 PROCEDURE — 83090 ASSAY OF HOMOCYSTEINE: CPT

## 2021-01-14 RX ORDER — TRAZODONE HYDROCHLORIDE 50 MG/1
50 TABLET ORAL NIGHTLY
COMMUNITY

## 2021-01-15 LAB
AT III PPP CHRO-ACNC: >128 % (ref 75–120)
F5 GENE MUT ANL BLD/T: NORMAL
FACT VIII ACT/NOR PPP: 160 % ACTIVITY (ref 70–160)
FACTOR II, DNA ANALYSIS: NORMAL
PROT C ACT/NOR PPP: >138 % (ref 70–130)

## 2021-01-16 LAB
APTT SCREEN TO CONFIRM RATIO: 0.72 RATIO (ref 0–1.4)
CONFIRM APTT/NORMAL: 46.8 SEC (ref 0–55)
DRVVT SCREEN TO CONFIRM RATIO: 1.6 RATIO (ref 0.8–1.2)
LA 2 SCREEN W REFLEX-IMP: ABNORMAL
MIXING DRVVT: 121.5 SEC (ref 0–40.4)
PROT S ACT/NOR PPP: 251 % (ref 63–140)
SCREEN APTT: 48.1 SEC (ref 0–51.9)
SCREEN DRVVT: 137.4 SEC (ref 0–47)
THROMBIN TIME: 18 SEC (ref 0–23)

## 2021-01-19 ENCOUNTER — READMISSION MANAGEMENT (OUTPATIENT)
Dept: CALL CENTER | Facility: HOSPITAL | Age: 56
End: 2021-01-19

## 2021-01-19 NOTE — OUTREACH NOTE
Medical Week 2 Survey      Responses   [facility] patient discharged from?  [facility]   Does the patient have one of the following disease processes/diagnoses(primary or secondary)?  Other   Week 2 attempt successful?  No   Unsuccessful attempts  Attempt 1          Liane Taylor LPN

## 2021-01-22 ENCOUNTER — READMISSION MANAGEMENT (OUTPATIENT)
Dept: CALL CENTER | Facility: HOSPITAL | Age: 56
End: 2021-01-22

## 2021-01-22 NOTE — OUTREACH NOTE
Medical Week 2 Survey      Responses   [facility] patient discharged from?  [facility]   Does the patient have one of the following disease processes/diagnoses(primary or secondary)?  Other   Week 2 attempt successful?  No   Unsuccessful attempts  Attempt 2          Latrice Escobedo RN

## 2021-01-29 ENCOUNTER — READMISSION MANAGEMENT (OUTPATIENT)
Dept: CALL CENTER | Facility: HOSPITAL | Age: 56
End: 2021-01-29

## 2021-02-01 ENCOUNTER — READMISSION MANAGEMENT (OUTPATIENT)
Dept: CALL CENTER | Facility: HOSPITAL | Age: 56
End: 2021-02-01

## 2021-02-02 NOTE — OUTREACH NOTE
Medical Week 3 Survey      Responses   [facility] patient discharged from?  [facility]   Does the patient have one of the following disease processes/diagnoses(primary or secondary)?  Other   Week 3 attempt successful?  No   Unsuccessful attempts  Attempt 2          Fadumo Rios LPN

## 2021-02-18 ENCOUNTER — TELEPHONE (OUTPATIENT)
Dept: ONCOLOGY | Facility: CLINIC | Age: 56
End: 2021-02-18

## 2021-02-18 ENCOUNTER — APPOINTMENT (OUTPATIENT)
Dept: LAB | Facility: HOSPITAL | Age: 56
End: 2021-02-18

## 2021-02-18 NOTE — TELEPHONE ENCOUNTER
Received call from patient stating she needed to reschedule today's MD appt due to inclement weather.  Pt rescheduled for Monday 2-22-21 at 10:45AM.  Patient v/u.

## 2021-02-22 ENCOUNTER — OFFICE VISIT (OUTPATIENT)
Dept: ONCOLOGY | Facility: CLINIC | Age: 56
End: 2021-02-22

## 2021-02-22 ENCOUNTER — LAB (OUTPATIENT)
Dept: LAB | Facility: HOSPITAL | Age: 56
End: 2021-02-22

## 2021-02-22 VITALS
BODY MASS INDEX: 42.88 KG/M2 | WEIGHT: 266.8 LBS | HEART RATE: 88 BPM | SYSTOLIC BLOOD PRESSURE: 150 MMHG | DIASTOLIC BLOOD PRESSURE: 86 MMHG | TEMPERATURE: 98.1 F | HEIGHT: 66 IN | RESPIRATION RATE: 20 BRPM

## 2021-02-22 DIAGNOSIS — N18.30 STAGE 3 CHRONIC KIDNEY DISEASE, UNSPECIFIED WHETHER STAGE 3A OR 3B CKD (HCC): Primary | ICD-10-CM

## 2021-02-22 DIAGNOSIS — I26.99 PULMONARY EMBOLISM, BILATERAL (HCC): ICD-10-CM

## 2021-02-22 LAB
BASOPHILS # BLD AUTO: 0.02 10*3/MM3 (ref 0–0.2)
BASOPHILS NFR BLD AUTO: 0.3 % (ref 0–1.5)
DEPRECATED RDW RBC AUTO: 46.3 FL (ref 37–54)
EOSINOPHIL # BLD AUTO: 0.15 10*3/MM3 (ref 0–0.4)
EOSINOPHIL NFR BLD AUTO: 2.5 % (ref 0.3–6.2)
ERYTHROCYTE [DISTWIDTH] IN BLOOD BY AUTOMATED COUNT: 13.7 % (ref 12.3–15.4)
HCT VFR BLD AUTO: 39.9 % (ref 34–46.6)
HGB BLD-MCNC: 13 G/DL (ref 12–15.9)
LYMPHOCYTES # BLD AUTO: 2.6 10*3/MM3 (ref 0.7–3.1)
LYMPHOCYTES NFR BLD AUTO: 42.6 % (ref 19.6–45.3)
MCH RBC QN AUTO: 31 PG (ref 26.6–33)
MCHC RBC AUTO-ENTMCNC: 32.6 G/DL (ref 31.5–35.7)
MCV RBC AUTO: 95.2 FL (ref 79–97)
MONOCYTES # BLD AUTO: 0.65 10*3/MM3 (ref 0.1–0.9)
MONOCYTES NFR BLD AUTO: 10.7 % (ref 5–12)
NEUTROPHILS NFR BLD AUTO: 2.68 10*3/MM3 (ref 1.7–7)
NEUTROPHILS NFR BLD AUTO: 43.9 % (ref 42.7–76)
PLATELET # BLD AUTO: 309 10*3/MM3 (ref 140–450)
PMV BLD AUTO: 10.4 FL (ref 6–12)
RBC # BLD AUTO: 4.19 10*6/MM3 (ref 3.77–5.28)
WBC # BLD AUTO: 6.1 10*3/MM3 (ref 3.4–10.8)

## 2021-02-22 PROCEDURE — 85025 COMPLETE CBC W/AUTO DIFF WBC: CPT

## 2021-02-22 PROCEDURE — 99214 OFFICE O/P EST MOD 30 MIN: CPT | Performed by: INTERNAL MEDICINE

## 2021-02-22 PROCEDURE — 36415 COLL VENOUS BLD VENIPUNCTURE: CPT

## 2021-02-22 NOTE — PROGRESS NOTES
HEMATOLOGY ONCOLOGY follow-up      Patient name: Danita Montiel  : 1965  MRN: [id number]  Primary Care Physician: Jaya Harper MD  Referring Physician: Jaya Harper MD  Reason For Consultation:   Appointment (Bilateral pulmonary embolism (CMS/HCC)) and Follow-up    History of Present Illness    Danita Montiel is 55 y.o. female who presented to our office on 21 for consultation regarding Pulmonary embolism and deep venous thrombosis.  Patient presented to the emergency room on 2020 with symptoms of increasing shortness of air of 1 week duration.  Patient was diagnosed with COVID-19 a month before the PE diagnosis.  CT scan was done and it showed extensive bilateral pulmonary emboli involving segmental and subsegmental pulmonary arteries.  There was a 1.4 cm noncalcified nodular density in the medial left lower lobe above the diaphragm which was new since .  CT chest PE protocol.    Patient also has a history of morbid obesity, asthma, GERD,Anxiety, hypertension, hypothyroidism, chronic kidney disease stage III.        · 2020 : 1. Extensive bilateral pulmonary emboli involving segmental and subsegmental pulmonary arteries. Pulmonary emboli are seen within all lobes of both lungs. No evidence of right heart strain. 1.4 cm noncalcified nodular density in the medial left lower lobe above the diaphragm, new since . This could represent focal pulmonary infarct or atelectasis. Consider short-term CT chest follow-up within 3-4 months to ensure improvement or resolution.  Questionable fibromuscular dysplasia of left renal artery, without high-grade stenosis. . Stable anterior mediastinal cyst.   · 2020: WBC 8.5, hemoglobin 12.5, platelets 247  · 2020: Doppler lower extremities: Acute left lower extremity DVT noted in the popliteal.  All other veins are normal.  · 2021: WBC 9.05, hemoglobin 12.7, platelets 315, MCV 93.9  · 2021: Danita Montiel  presents for initial consultation for recent diagnosis of pulmonary embolism and left leg deep venous thrombosis.  Patient denies any immobilization, long road trips, flight units, hormone usage or any surgeries before her thrombosis diagnosis.  She was diagnosed with COVID-19 infection about a month before the diagnosis.  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  She denies any swelling in her left leg currently.  She does have lymphedema both extremities which she thinks is due to abdominal surgery when she was younger.  She had surgery for endometriosis.  · 1/14/2021:WBC 9.05, hemoglobin 12.7, platelets 315 , Antithrombin  high, D-dimer 0.5, factor V Leiden negative, factor VIII activity 160%, prothrombin gene mutation negative, plasma homocystine 12.4 normal, lupus anticoagulant testing: Positive.  DRV .4 high, protein C activity 138%, protein S activity 251%, DRVVT 1:1 mix 121.5 high,  · 2/22/2021: WBC 6.1, hemoglobin 13, platelets 309      Subjective   Patient doing well.  No new complaints.  Swelling of the left leg is resolved.  She has chronic right leg swelling due to a previous surgery.         Past Medical History:   Diagnosis Date   • Anxiety    • Asthma    • CKD (chronic kidney disease) stage 3, GFR 30-59 ml/min (CMS/[facility])    • COVID-19 11/16/2020   • Endometriosis    • GERD (gastroesophageal reflux disease)    • Hypertension    • Hypothyroidism    • Obesity    • Ovarian cyst    Lymphedema more on the right side    Past Surgical History:   Procedure Laterality Date   • DIAGNOSTIC LAPAROSCOPY     • OVARIAN CYST DRAINAGE     Endometriosis      Current Outpatient Medications:   •  albuterol sulfate  (90 Base) MCG/ACT inhaler, Inhale 2 puffs Every 4 (Four) Hours As Needed for Wheezing., Disp: , Rfl:   •  amLODIPine-Valsartan-HCTZ (Exforge HCT) -25 MG tablet, Take 1 tablet by mouth Daily., Disp: , Rfl:   •  FLUoxetine  "(PROzac) 40 MG capsule, Take 40 mg by mouth Daily., Disp: , Rfl:   •  fluticasone-salmeterol (ADVAIR) 250-50 MCG/DOSE DISKUS, Inhale 1 puff 2 (two) times a day., Disp: , Rfl:   •  levothyroxine (SYNTHROID, LEVOTHROID) 112 MCG tablet, Take 112 mcg by mouth Daily., Disp: , Rfl:   •  metoprolol tartrate (LOPRESSOR) 50 MG tablet, Take 50 mg by mouth 2 (Two) Times a Day., Disp: , Rfl:   •  montelukast (SINGULAIR) 10 MG tablet, Take 10 mg by mouth Every Night., Disp: , Rfl:   •  Rivaroxaban (Xarelto Starter Pack) tablet therapy pack starter pack, Take one 15 mg tablet twice daily with food for 21 days.  Followed by one 20 mg tablet by mouth once daily with food. Take as directed, Disp: 60 tablet, Rfl: 3  •  rivaroxaban (XARELTO) 20 MG tablet, Take 1 tablet by mouth Daily., Disp: 30 tablet, Rfl: 5  •  traZODone (DESYREL) 50 MG tablet, Take 50 mg by mouth Every Night., Disp: , Rfl:     Allergies   Allergen Reactions   • Clarithromycin Itching       Family History   Problem Relation Age of Onset   • Heart disease Paternal Grandmother    • Heart disease Paternal Grandfather    • Stroke Other    Great grandfather had bladder cancer.  Grandfather had stomach cancer.  Mother had DVT.  Cancer-related family history is not on file.    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    Social History     Social History Narrative   • Not on file       Objective   Vital Signs:    Vitals:    02/22/21 1051   BP: 150/86   Pulse: 88   Resp: 20   Temp: 98.1 °F (36.7 °C)   Weight: 121 kg (266 lb 12.8 oz)   Height: 167.6 cm (66\")   PainSc: 0-No pain     Body mass index is 43.06 kg/m².   Physical Exam  Vitals signs and nursing note " reviewed.   Constitutional:       General: She is not in acute distress.     Appearance: Normal appearance. She is obese. She is not diaphoretic.   HENT:      Head: Normocephalic and atraumatic.   Eyes:      General: No scleral icterus.        Right eye: No discharge.         Left eye: No discharge.      Conjunctiva/sclera: Conjunctivae normal.   Neck:      Musculoskeletal: Normal range of motion and neck supple.      Thyroid: No thyromegaly.   Cardiovascular:      Rate and Rhythm: Normal rate and regular rhythm.      Heart sounds: Normal heart sounds. No friction rub. No gallop.    Pulmonary:      Effort: Pulmonary effort is normal. No respiratory distress.      Breath sounds: No stridor. No wheezing.   Abdominal:      General: Bowel sounds are normal.      Palpations: Abdomen is soft. There is no mass.      Tenderness: There is no abdominal tenderness. There is no guarding or rebound.   Musculoskeletal: Normal range of motion.         General: No tenderness.      Right lower leg: Edema present.      Comments: Varicose veins bilateral legs.   Lymphadenopathy:      Cervical: No cervical adenopathy.   Skin:     General: Skin is warm.      Findings: No erythema or rash.   Neurological:      General: No focal deficit present.      Mental Status: She is alert and oriented to person, place, and time.      Motor: No abnormal muscle tone.   Psychiatric:         Mood and Affect: Mood normal.         Behavior: Behavior normal.         Thought Content: Thought content normal.            Result Review :     Lab Results - Last 18 Months   Lab Units 02/22/21  1047 01/14/21  1001 12/29/20  0408   WBC 10*3/mm3 6.10 9.05 8.50   HEMOGLOBIN g/dL 13.0 12.7 12.5   HEMATOCRIT % 39.9 38.5 37.7   PLATELETS 10*3/mm3 309 315 247   MCV fL 95.2 93.9 93.1     Lab Results - Last 18 Months   Lab Units 12/30/20  0404 12/29/20  0408 12/28/20  1034   SODIUM mmol/L  --  138 135*   POTASSIUM mmol/L 4.0 4.1 4.5   CHLORIDE mmol/L  --  100 96*   CO2  mmol/L  --  26.0 25.0   BUN mg/dL  --  17 21*   CREATININE mg/dL  --  1.02* 1.19*   CALCIUM mg/dL  --  9.4 9.8   GLUCOSE mg/dL  --  102* 108*       Lab Results   Component Value Date    GLUCOSE 102 (H) 12/29/2020    BUN 17 12/29/2020    CREATININE 1.02 (H) 12/29/2020    EGFRIFNONA 56 (L) 12/29/2020    BCR 16.7 12/29/2020    K 4.0 12/30/2020    CO2 26.0 12/29/2020    CALCIUM 9.4 12/29/2020    ALBUMIN 4.0 04/17/2019    LABIL2 1.4 04/17/2019    AST 32 04/17/2019    ALT 28 04/17/2019     No results found for: IRON, TIBC, FERRITIN  No results found for: FOLATE  No results found for: OCCULTBLD  No results found for: RETICCTPCT  No results found for: [id number], TSH  No results found for: SPEP, UPEP  No results found for: LDH, URICACID  No results found for: MOSHE, RF, SEDRATE  Lab Results   Component Value Date    DDIMER 0.55 01/14/2021     Lab Results   Component Value Date    DDIMER 0.55 01/14/2021     No results found for: PTT, INR  No results found for:   No results found for: CEA  No results found for:   No results found for: PSA  No results found for: [id number]   No results found for: AFP   No results found for: SEDRATE   Lab Results   Component Value Date    CALCIUM 9.4 12/29/2020                       CC Problem List  Visit Diagnosis  ROS  Review (Popup)  Health Maintenance  Quality  BestPractice  Medications  SmartSets  SnapShot Encounters  Media :23}   Problem List Items Addressed This Visit     None          Assessment     1. Bilateral pulmonary embolism and left leg deep venous thrombosis: Patient had COVID-19 infection about a month before diagnosis.  Patient has a positive family history of DVT in her mother.  Started on Xarelto.  She is tolerating it well.  2. 1.4 cm lung nodule in the left lower lobe: Patient is a non-smoker.  This was diagnosed incidentally.  It needs follow-up.  3. Insomnia: Patient to try trazodone.        Plan:     1. Will need a follow-up CT scan of the chest as well  as reimaging Doppler of her left lower extremity in about 3 months.  2. Continue Xarelto at 20 mg daily.  3. Follow-up in 3 months        No orders of the defined types were placed in this encounter.       Follow Up   No follow-ups on file.      Thank you very much for providing the opportunity to participate in this patient’s care. Please do not hesitate to call if there are any other questions.    I have reviewed and confirmed the accuracy of the patient's history: Chief complaint, HPI, ROS, Subjective and Past Family Social History as entered by the MA/LPN/RN. Von Vanegas MD 02/22/21     Electronically signed by Von Vanegas MD, 02/22/21, 11:39 AM EST.

## 2021-05-20 ENCOUNTER — TELEPHONE (OUTPATIENT)
Dept: ONCOLOGY | Facility: CLINIC | Age: 56
End: 2021-05-20

## 2021-05-24 ENCOUNTER — APPOINTMENT (OUTPATIENT)
Dept: LAB | Facility: HOSPITAL | Age: 56
End: 2021-05-24

## 2021-05-25 NOTE — TELEPHONE ENCOUNTER
S/w Pt to r/s 5/24 appt. Was made for 7/7 per her req. Gave Pt Central Scheduling # again also per her req

## 2021-07-02 ENCOUNTER — TELEPHONE (OUTPATIENT)
Dept: ONCOLOGY | Facility: HOSPITAL | Age: 56
End: 2021-07-02

## 2021-07-02 NOTE — TELEPHONE ENCOUNTER
[facility] insurance called and stated they had an auth [id number] and pt wanted [facility]. Faxed to [facility] at 3:49 pm.

## 2021-07-06 ENCOUNTER — APPOINTMENT (OUTPATIENT)
Dept: CT IMAGING | Facility: HOSPITAL | Age: 56
End: 2021-07-06

## 2021-07-06 ENCOUNTER — APPOINTMENT (OUTPATIENT)
Dept: CARDIOLOGY | Facility: HOSPITAL | Age: 56
End: 2021-07-06

## 2021-07-07 ENCOUNTER — APPOINTMENT (OUTPATIENT)
Dept: LAB | Facility: HOSPITAL | Age: 56
End: 2021-07-07

## 2021-07-26 RX ORDER — RIVAROXABAN 20 MG/1
TABLET, FILM COATED ORAL
Qty: 30 TABLET | Refills: 5 | Status: SHIPPED | OUTPATIENT
Start: 2021-07-26 | End: 2021-09-10 | Stop reason: HOSPADM

## 2021-08-29 ENCOUNTER — HOSPITAL ENCOUNTER (INPATIENT)
Facility: HOSPITAL | Age: 56
LOS: 12 days | Discharge: HOME | End: 2021-09-10
Attending: EMERGENCY MEDICINE | Admitting: INTERNAL MEDICINE

## 2021-08-29 ENCOUNTER — APPOINTMENT (OUTPATIENT)
Dept: GENERAL RADIOLOGY | Facility: HOSPITAL | Age: 56
End: 2021-08-29

## 2021-08-29 DIAGNOSIS — J12.82 PNEUMONIA DUE TO COVID-19 VIRUS: ICD-10-CM

## 2021-08-29 DIAGNOSIS — R06.00 DYSPNEA, UNSPECIFIED TYPE: Primary | ICD-10-CM

## 2021-08-29 DIAGNOSIS — U07.1 PNEUMONIA DUE TO COVID-19 VIRUS: ICD-10-CM

## 2021-08-29 DIAGNOSIS — R09.02 HYPOXIA: ICD-10-CM

## 2021-08-29 DIAGNOSIS — U07.1 COVID-19: ICD-10-CM

## 2021-08-29 LAB
ALBUMIN SERPL-MCNC: 3.9 G/DL (ref 3.5–5.2)
ALBUMIN SERPL-MCNC: 4 G/DL (ref 3.5–5.2)
ALBUMIN/GLOB SERPL: 1.1 G/DL
ALP SERPL-CCNC: 80 U/L (ref 39–117)
ALP SERPL-CCNC: 80 U/L (ref 39–117)
ALT SERPL W P-5'-P-CCNC: 27 U/L (ref 1–33)
ALT SERPL W P-5'-P-CCNC: 28 U/L (ref 1–33)
ANION GAP SERPL CALCULATED.3IONS-SCNC: 15 MMOL/L (ref 5–15)
ARTERIAL PATENCY WRIST A: POSITIVE
AST SERPL-CCNC: 40 U/L (ref 1–32)
AST SERPL-CCNC: 43 U/L (ref 1–32)
ATMOSPHERIC PRESS: ABNORMAL MM[HG]
BASE EXCESS BLDA CALC-SCNC: -2.1 MMOL/L (ref 0–3)
BASOPHILS # BLD AUTO: 0.1 10*3/MM3 (ref 0–0.2)
BASOPHILS NFR BLD AUTO: 0.6 % (ref 0–1.5)
BDY SITE: ABNORMAL
BILIRUB CONJ SERPL-MCNC: <0.2 MG/DL (ref 0–0.3)
BILIRUB INDIRECT SERPL-MCNC: ABNORMAL MG/DL
BILIRUB SERPL-MCNC: 0.4 MG/DL (ref 0–1.2)
BILIRUB SERPL-MCNC: 0.4 MG/DL (ref 0–1.2)
BUN SERPL-MCNC: 25 MG/DL (ref 6–20)
BUN/CREAT SERPL: 19.2 (ref 7–25)
CALCIUM SPEC-SCNC: 8.9 MG/DL (ref 8.6–10.5)
CHLORIDE SERPL-SCNC: 94 MMOL/L (ref 98–107)
CO2 BLDA-SCNC: 21.7 MMOL/L (ref 22–29)
CO2 SERPL-SCNC: 22 MMOL/L (ref 22–29)
CREAT SERPL-MCNC: 1.3 MG/DL (ref 0.57–1)
CREAT SERPL-MCNC: 1.32 MG/DL (ref 0.57–1)
CRP SERPL-MCNC: 14.43 MG/DL (ref 0–0.5)
D DIMER PPP FEU-MCNC: 0.36 MG/L (FEU) (ref 0–0.59)
D-LACTATE SERPL-SCNC: 1.1 MMOL/L (ref 0.5–2)
DEPRECATED RDW RBC AUTO: 43.8 FL (ref 37–54)
EOSINOPHIL # BLD AUTO: 0 10*3/MM3 (ref 0–0.4)
EOSINOPHIL NFR BLD AUTO: 0 % (ref 0.3–6.2)
ERYTHROCYTE [DISTWIDTH] IN BLOOD BY AUTOMATED COUNT: 13.7 % (ref 12.3–15.4)
GFR SERPL CREATININE-BSD FRML MDRD: 41 ML/MIN/1.73
GFR SERPL CREATININE-BSD FRML MDRD: 42 ML/MIN/1.73
GLOBULIN UR ELPH-MCNC: 3.7 GM/DL
GLUCOSE SERPL-MCNC: 111 MG/DL (ref 65–99)
HCO3 BLDA-SCNC: 20.8 MMOL/L (ref 21–28)
HCT VFR BLD AUTO: 41.1 % (ref 34–46.6)
HEMODILUTION: NO
HGB BLD-MCNC: 14.3 G/DL (ref 12–15.9)
INHALED O2 CONCENTRATION: 32 %
LYMPHOCYTES # BLD AUTO: 0.9 10*3/MM3 (ref 0.7–3.1)
LYMPHOCYTES NFR BLD AUTO: 10.3 % (ref 19.6–45.3)
MCH RBC QN AUTO: 31.9 PG (ref 26.6–33)
MCHC RBC AUTO-ENTMCNC: 34.7 G/DL (ref 31.5–35.7)
MCV RBC AUTO: 91.9 FL (ref 79–97)
MODALITY: ABNORMAL
MONOCYTES # BLD AUTO: 0.8 10*3/MM3 (ref 0.1–0.9)
MONOCYTES NFR BLD AUTO: 8.9 % (ref 5–12)
NEUTROPHILS NFR BLD AUTO: 7.2 10*3/MM3 (ref 1.7–7)
NEUTROPHILS NFR BLD AUTO: 80.2 % (ref 42.7–76)
NRBC BLD AUTO-RTO: 0.1 /100 WBC (ref 0–0.2)
PCO2 BLDA: 30 MM HG (ref 35–48)
PH BLDA: 7.45 PH UNITS (ref 7.35–7.45)
PLATELET # BLD AUTO: 196 10*3/MM3 (ref 140–450)
PMV BLD AUTO: 8.8 FL (ref 6–12)
PO2 BLDA: 65.7 MM HG (ref 83–108)
POTASSIUM SERPL-SCNC: 3.7 MMOL/L (ref 3.5–5.2)
PROT SERPL-MCNC: 7.4 G/DL (ref 6–8.5)
PROT SERPL-MCNC: 7.6 G/DL (ref 6–8.5)
RBC # BLD AUTO: 4.47 10*6/MM3 (ref 3.77–5.28)
SAO2 % BLDCOA: 93.9 % (ref 94–98)
SARS-COV-2 RNA PNL SPEC NAA+PROBE: DETECTED
SODIUM SERPL-SCNC: 131 MMOL/L (ref 136–145)
WBC # BLD AUTO: 9 10*3/MM3 (ref 3.4–10.8)
WHOLE BLOOD HOLD SPECIMEN: NORMAL

## 2021-08-29 PROCEDURE — 86140 C-REACTIVE PROTEIN: CPT | Performed by: INTERNAL MEDICINE

## 2021-08-29 PROCEDURE — 25010000002 DEXAMETHASONE SODIUM PHOSPHATE 10 MG/ML SOLUTION: Performed by: EMERGENCY MEDICINE

## 2021-08-29 PROCEDURE — 99284 EMERGENCY DEPT VISIT MOD MDM: CPT

## 2021-08-29 PROCEDURE — 94799 UNLISTED PULMONARY SVC/PX: CPT

## 2021-08-29 PROCEDURE — 93005 ELECTROCARDIOGRAM TRACING: CPT | Performed by: EMERGENCY MEDICINE

## 2021-08-29 PROCEDURE — 80076 HEPATIC FUNCTION PANEL: CPT | Performed by: INTERNAL MEDICINE

## 2021-08-29 PROCEDURE — 94640 AIRWAY INHALATION TREATMENT: CPT

## 2021-08-29 PROCEDURE — 82565 ASSAY OF CREATININE: CPT | Performed by: INTERNAL MEDICINE

## 2021-08-29 PROCEDURE — 85025 COMPLETE CBC W/AUTO DIFF WBC: CPT | Performed by: EMERGENCY MEDICINE

## 2021-08-29 PROCEDURE — 36600 WITHDRAWAL OF ARTERIAL BLOOD: CPT

## 2021-08-29 PROCEDURE — XW033E5 INTRODUCTION OF REMDESIVIR ANTI-INFECTIVE INTO PERIPHERAL VEIN, PERCUTANEOUS APPROACH, NEW TECHNOLOGY GROUP 5: ICD-10-PCS | Performed by: INTERNAL MEDICINE

## 2021-08-29 PROCEDURE — 71045 X-RAY EXAM CHEST 1 VIEW: CPT

## 2021-08-29 PROCEDURE — 83605 ASSAY OF LACTIC ACID: CPT

## 2021-08-29 PROCEDURE — 87040 BLOOD CULTURE FOR BACTERIA: CPT | Performed by: EMERGENCY MEDICINE

## 2021-08-29 PROCEDURE — 87635 SARS-COV-2 COVID-19 AMP PRB: CPT | Performed by: EMERGENCY MEDICINE

## 2021-08-29 PROCEDURE — 82803 BLOOD GASES ANY COMBINATION: CPT

## 2021-08-29 PROCEDURE — 80053 COMPREHEN METABOLIC PANEL: CPT | Performed by: EMERGENCY MEDICINE

## 2021-08-29 PROCEDURE — 85379 FIBRIN DEGRADATION QUANT: CPT | Performed by: INTERNAL MEDICINE

## 2021-08-29 RX ORDER — FEXOFENADINE HCL 180 MG/1
180 TABLET ORAL DAILY
COMMUNITY
End: 2021-09-10 | Stop reason: HOSPADM

## 2021-08-29 RX ORDER — MONTELUKAST SODIUM 10 MG/1
10 TABLET ORAL NIGHTLY
Status: DISCONTINUED | OUTPATIENT
Start: 2021-08-29 | End: 2021-09-10 | Stop reason: HOSPADM

## 2021-08-29 RX ORDER — LEVOTHYROXINE SODIUM 112 UG/1
112 TABLET ORAL
Status: DISCONTINUED | OUTPATIENT
Start: 2021-08-30 | End: 2021-09-10 | Stop reason: HOSPADM

## 2021-08-29 RX ORDER — FLUOXETINE HYDROCHLORIDE 20 MG/1
40 CAPSULE ORAL DAILY
Status: DISCONTINUED | OUTPATIENT
Start: 2021-08-30 | End: 2021-09-10 | Stop reason: HOSPADM

## 2021-08-29 RX ORDER — ASCORBIC ACID 500 MG
500 TABLET ORAL DAILY
Status: DISCONTINUED | OUTPATIENT
Start: 2021-08-30 | End: 2021-09-10 | Stop reason: HOSPADM

## 2021-08-29 RX ORDER — ONDANSETRON HYDROCHLORIDE 8 MG/1
8 TABLET, FILM COATED ORAL 4 TIMES DAILY PRN
COMMUNITY

## 2021-08-29 RX ORDER — BUDESONIDE AND FORMOTEROL FUMARATE DIHYDRATE 160; 4.5 UG/1; UG/1
2 AEROSOL RESPIRATORY (INHALATION)
Status: DISCONTINUED | OUTPATIENT
Start: 2021-08-29 | End: 2021-08-30

## 2021-08-29 RX ORDER — DEXAMETHASONE SODIUM PHOSPHATE 10 MG/ML
6 INJECTION, SOLUTION INTRAMUSCULAR; INTRAVENOUS ONCE
Status: COMPLETED | OUTPATIENT
Start: 2021-08-29 | End: 2021-08-29

## 2021-08-29 RX ORDER — PANTOPRAZOLE SODIUM 40 MG/1
40 TABLET, DELAYED RELEASE ORAL EVERY MORNING
Status: DISCONTINUED | OUTPATIENT
Start: 2021-08-30 | End: 2021-09-10 | Stop reason: HOSPADM

## 2021-08-29 RX ORDER — LECITHIN 1200 MG
1 CAPSULE ORAL DAILY
COMMUNITY
End: 2021-09-10 | Stop reason: HOSPADM

## 2021-08-29 RX ORDER — LORAZEPAM 1 MG/1
1 TABLET ORAL EVERY 6 HOURS PRN
Status: DISCONTINUED | OUTPATIENT
Start: 2021-08-29 | End: 2021-09-10 | Stop reason: HOSPADM

## 2021-08-29 RX ORDER — TRAZODONE HYDROCHLORIDE 50 MG/1
50 TABLET ORAL NIGHTLY
Status: DISCONTINUED | OUTPATIENT
Start: 2021-08-29 | End: 2021-09-10 | Stop reason: HOSPADM

## 2021-08-29 RX ORDER — VIT C/B6/B5/MAGNESIUM/HERB 173 50-5-6-5MG
1 CAPSULE ORAL DAILY
COMMUNITY
End: 2021-09-10 | Stop reason: HOSPADM

## 2021-08-29 RX ORDER — CHOLECALCIFEROL (VITAMIN D3) 125 MCG
5 CAPSULE ORAL NIGHTLY PRN
Status: DISCONTINUED | OUTPATIENT
Start: 2021-08-29 | End: 2021-09-10 | Stop reason: HOSPADM

## 2021-08-29 RX ORDER — SODIUM CHLORIDE 0.9 % (FLUSH) 0.9 %
10 SYRINGE (ML) INJECTION AS NEEDED
Status: DISCONTINUED | OUTPATIENT
Start: 2021-08-29 | End: 2021-09-10 | Stop reason: HOSPADM

## 2021-08-29 RX ORDER — ALBUTEROL SULFATE 90 UG/1
2 AEROSOL, METERED RESPIRATORY (INHALATION)
Status: DISCONTINUED | OUTPATIENT
Start: 2021-08-29 | End: 2021-09-10 | Stop reason: HOSPADM

## 2021-08-29 RX ORDER — ZINC SULFATE 50(220)MG
220 CAPSULE ORAL DAILY
Status: DISCONTINUED | OUTPATIENT
Start: 2021-08-30 | End: 2021-09-10 | Stop reason: HOSPADM

## 2021-08-29 RX ORDER — DEXAMETHASONE SODIUM PHOSPHATE 10 MG/ML
6 INJECTION, SOLUTION INTRAMUSCULAR; INTRAVENOUS EVERY 24 HOURS
Status: DISCONTINUED | OUTPATIENT
Start: 2021-08-30 | End: 2021-08-30

## 2021-08-29 RX ORDER — ONDANSETRON 4 MG/1
4 TABLET, FILM COATED ORAL EVERY 6 HOURS PRN
Status: DISCONTINUED | OUTPATIENT
Start: 2021-08-29 | End: 2021-09-10 | Stop reason: HOSPADM

## 2021-08-29 RX ORDER — MELATONIN
1000 DAILY
Status: DISCONTINUED | OUTPATIENT
Start: 2021-08-30 | End: 2021-09-10 | Stop reason: HOSPADM

## 2021-08-29 RX ORDER — OMEPRAZOLE 40 MG/1
40 CAPSULE, DELAYED RELEASE ORAL DAILY
COMMUNITY

## 2021-08-29 RX ORDER — SODIUM CHLORIDE 0.9 % (FLUSH) 0.9 %
3-10 SYRINGE (ML) INJECTION AS NEEDED
Status: DISCONTINUED | OUTPATIENT
Start: 2021-08-29 | End: 2021-09-10 | Stop reason: HOSPADM

## 2021-08-29 RX ORDER — BENZONATATE 100 MG/1
200 CAPSULE ORAL 3 TIMES DAILY PRN
Status: DISCONTINUED | OUTPATIENT
Start: 2021-08-29 | End: 2021-09-10 | Stop reason: HOSPADM

## 2021-08-29 RX ORDER — SODIUM CHLORIDE 0.9 % (FLUSH) 0.9 %
3 SYRINGE (ML) INJECTION EVERY 12 HOURS SCHEDULED
Status: DISCONTINUED | OUTPATIENT
Start: 2021-08-29 | End: 2021-09-10 | Stop reason: HOSPADM

## 2021-08-29 RX ADMIN — SODIUM CHLORIDE 1000 ML: 9 INJECTION, SOLUTION INTRAVENOUS at 18:06

## 2021-08-29 RX ADMIN — RIVAROXABAN 20 MG: 20 TABLET, FILM COATED ORAL at 19:06

## 2021-08-29 RX ADMIN — ALBUTEROL SULFATE 2 PUFF: 90 AEROSOL, METERED RESPIRATORY (INHALATION) at 20:28

## 2021-08-29 RX ADMIN — BUDESONIDE AND FORMOTEROL FUMARATE DIHYDRATE 2 PUFF: 160; 4.5 AEROSOL RESPIRATORY (INHALATION) at 20:28

## 2021-08-29 RX ADMIN — TRAZODONE HYDROCHLORIDE 50 MG: 50 TABLET ORAL at 21:13

## 2021-08-29 RX ADMIN — REMDESIVIR 200 MG: 100 INJECTION, POWDER, LYOPHILIZED, FOR SOLUTION INTRAVENOUS at 19:19

## 2021-08-29 RX ADMIN — DEXAMETHASONE SODIUM PHOSPHATE 6 MG: 10 INJECTION, SOLUTION INTRAMUSCULAR; INTRAVENOUS at 13:26

## 2021-08-29 RX ADMIN — MONTELUKAST 10 MG: 10 TABLET, FILM COATED ORAL at 21:13

## 2021-08-29 RX ADMIN — Medication 3 ML: at 21:13

## 2021-08-29 RX ADMIN — ALBUTEROL SULFATE 2 PUFF: 90 AEROSOL, METERED RESPIRATORY (INHALATION) at 23:34

## 2021-08-30 LAB
ALBUMIN SERPL-MCNC: 3.7 G/DL (ref 3.5–5.2)
ALBUMIN/GLOB SERPL: 1 G/DL
ALP SERPL-CCNC: 75 U/L (ref 39–117)
ALT SERPL W P-5'-P-CCNC: 26 U/L (ref 1–33)
ANION GAP SERPL CALCULATED.3IONS-SCNC: 14 MMOL/L (ref 5–15)
AST SERPL-CCNC: 39 U/L (ref 1–32)
BASOPHILS # BLD AUTO: 0 10*3/MM3 (ref 0–0.2)
BASOPHILS NFR BLD AUTO: 0.2 % (ref 0–1.5)
BILIRUB CONJ SERPL-MCNC: <0.2 MG/DL (ref 0–0.3)
BILIRUB SERPL-MCNC: 0.3 MG/DL (ref 0–1.2)
BUN SERPL-MCNC: 23 MG/DL (ref 6–20)
BUN/CREAT SERPL: 23 (ref 7–25)
CALCIUM SPEC-SCNC: 8.9 MG/DL (ref 8.6–10.5)
CHLORIDE SERPL-SCNC: 97 MMOL/L (ref 98–107)
CO2 SERPL-SCNC: 24 MMOL/L (ref 22–29)
CREAT SERPL-MCNC: 1 MG/DL (ref 0.57–1)
CRP SERPL-MCNC: 21.29 MG/DL (ref 0–0.5)
D DIMER PPP FEU-MCNC: 0.53 MG/L (FEU) (ref 0–0.59)
DEPRECATED RDW RBC AUTO: 43.3 FL (ref 37–54)
EOSINOPHIL # BLD AUTO: 0 10*3/MM3 (ref 0–0.4)
EOSINOPHIL NFR BLD AUTO: 0 % (ref 0.3–6.2)
ERYTHROCYTE [DISTWIDTH] IN BLOOD BY AUTOMATED COUNT: 13.5 % (ref 12.3–15.4)
GFR SERPL CREATININE-BSD FRML MDRD: 57 ML/MIN/1.73
GLOBULIN UR ELPH-MCNC: 3.7 GM/DL
GLUCOSE SERPL-MCNC: 99 MG/DL (ref 65–99)
HCT VFR BLD AUTO: 39.3 % (ref 34–46.6)
HGB BLD-MCNC: 13.5 G/DL (ref 12–15.9)
LYMPHOCYTES # BLD AUTO: 1 10*3/MM3 (ref 0.7–3.1)
LYMPHOCYTES NFR BLD AUTO: 10.7 % (ref 19.6–45.3)
MCH RBC QN AUTO: 32.3 PG (ref 26.6–33)
MCHC RBC AUTO-ENTMCNC: 34.5 G/DL (ref 31.5–35.7)
MCV RBC AUTO: 93.8 FL (ref 79–97)
MONOCYTES # BLD AUTO: 1 10*3/MM3 (ref 0.1–0.9)
MONOCYTES NFR BLD AUTO: 10.6 % (ref 5–12)
NEUTROPHILS NFR BLD AUTO: 7.3 10*3/MM3 (ref 1.7–7)
NEUTROPHILS NFR BLD AUTO: 78.5 % (ref 42.7–76)
NRBC BLD AUTO-RTO: 0.1 /100 WBC (ref 0–0.2)
PLATELET # BLD AUTO: 175 10*3/MM3 (ref 140–450)
PMV BLD AUTO: 9.1 FL (ref 6–12)
POTASSIUM SERPL-SCNC: 3.9 MMOL/L (ref 3.5–5.2)
PROT SERPL-MCNC: 7.4 G/DL (ref 6–8.5)
QT INTERVAL: 322 MS
RBC # BLD AUTO: 4.19 10*6/MM3 (ref 3.77–5.28)
SODIUM SERPL-SCNC: 135 MMOL/L (ref 136–145)
TSH SERPL DL<=0.05 MIU/L-ACNC: 0.36 UIU/ML (ref 0.27–4.2)
WBC # BLD AUTO: 9.3 10*3/MM3 (ref 3.4–10.8)

## 2021-08-30 PROCEDURE — 85025 COMPLETE CBC W/AUTO DIFF WBC: CPT | Performed by: INTERNAL MEDICINE

## 2021-08-30 PROCEDURE — 25010000002 DEXAMETHASONE PER 1 MG: Performed by: INTERNAL MEDICINE

## 2021-08-30 PROCEDURE — 87040 BLOOD CULTURE FOR BACTERIA: CPT | Performed by: INTERNAL MEDICINE

## 2021-08-30 PROCEDURE — 80053 COMPREHEN METABOLIC PANEL: CPT | Performed by: INTERNAL MEDICINE

## 2021-08-30 PROCEDURE — 93005 ELECTROCARDIOGRAM TRACING: CPT | Performed by: INTERNAL MEDICINE

## 2021-08-30 PROCEDURE — 94799 UNLISTED PULMONARY SVC/PX: CPT

## 2021-08-30 PROCEDURE — 82248 BILIRUBIN DIRECT: CPT | Performed by: INTERNAL MEDICINE

## 2021-08-30 PROCEDURE — 25010000002 CEFTRIAXONE PER 250 MG: Performed by: INTERNAL MEDICINE

## 2021-08-30 PROCEDURE — 93010 ELECTROCARDIOGRAM REPORT: CPT | Performed by: INTERNAL MEDICINE

## 2021-08-30 PROCEDURE — 86140 C-REACTIVE PROTEIN: CPT | Performed by: INTERNAL MEDICINE

## 2021-08-30 PROCEDURE — 84443 ASSAY THYROID STIM HORMONE: CPT | Performed by: INTERNAL MEDICINE

## 2021-08-30 PROCEDURE — 36415 COLL VENOUS BLD VENIPUNCTURE: CPT | Performed by: INTERNAL MEDICINE

## 2021-08-30 PROCEDURE — 85379 FIBRIN DEGRADATION QUANT: CPT | Performed by: INTERNAL MEDICINE

## 2021-08-30 RX ORDER — IPRATROPIUM BROMIDE AND ALBUTEROL SULFATE 2.5; .5 MG/3ML; MG/3ML
3 SOLUTION RESPIRATORY (INHALATION)
Status: DISCONTINUED | OUTPATIENT
Start: 2021-08-30 | End: 2021-08-30

## 2021-08-30 RX ORDER — DEXAMETHASONE SODIUM PHOSPHATE 4 MG/ML
4 INJECTION, SOLUTION INTRA-ARTICULAR; INTRALESIONAL; INTRAMUSCULAR; INTRAVENOUS; SOFT TISSUE EVERY 12 HOURS
Status: DISCONTINUED | OUTPATIENT
Start: 2021-08-30 | End: 2021-09-01

## 2021-08-30 RX ORDER — ARFORMOTEROL TARTRATE 15 UG/2ML
15 SOLUTION RESPIRATORY (INHALATION)
Status: DISCONTINUED | OUTPATIENT
Start: 2021-08-30 | End: 2021-08-30

## 2021-08-30 RX ORDER — DEXAMETHASONE SODIUM PHOSPHATE 4 MG/ML
6 INJECTION, SOLUTION INTRA-ARTICULAR; INTRALESIONAL; INTRAMUSCULAR; INTRAVENOUS; SOFT TISSUE DAILY
Status: DISCONTINUED | OUTPATIENT
Start: 2021-08-30 | End: 2021-08-30

## 2021-08-30 RX ORDER — GUAIFENESIN/DEXTROMETHORPHAN 100-10MG/5
5 SYRUP ORAL EVERY 4 HOURS PRN
Status: CANCELLED | OUTPATIENT
Start: 2021-08-30

## 2021-08-30 RX ORDER — DOXYCYCLINE 100 MG/1
100 TABLET ORAL EVERY 12 HOURS SCHEDULED
Status: ACTIVE | OUTPATIENT
Start: 2021-08-30 | End: 2021-09-06

## 2021-08-30 RX ORDER — BUDESONIDE 0.5 MG/2ML
0.5 INHALANT ORAL
Status: DISCONTINUED | OUTPATIENT
Start: 2021-08-30 | End: 2021-08-30

## 2021-08-30 RX ORDER — BUDESONIDE AND FORMOTEROL FUMARATE DIHYDRATE 160; 4.5 UG/1; UG/1
2 AEROSOL RESPIRATORY (INHALATION)
Status: DISCONTINUED | OUTPATIENT
Start: 2021-08-30 | End: 2021-09-10 | Stop reason: HOSPADM

## 2021-08-30 RX ORDER — DILTIAZEM HYDROCHLORIDE 180 MG/1
180 CAPSULE, COATED, EXTENDED RELEASE ORAL
Status: DISCONTINUED | OUTPATIENT
Start: 2021-08-31 | End: 2021-09-03

## 2021-08-30 RX ORDER — METOPROLOL TARTRATE 5 MG/5ML
5 INJECTION INTRAVENOUS EVERY 4 HOURS PRN
Status: DISCONTINUED | OUTPATIENT
Start: 2021-08-30 | End: 2021-09-10 | Stop reason: HOSPADM

## 2021-08-30 RX ADMIN — ALBUTEROL SULFATE 2 PUFF: 90 AEROSOL, METERED RESPIRATORY (INHALATION) at 11:59

## 2021-08-30 RX ADMIN — DEXAMETHASONE SODIUM PHOSPHATE 6 MG: 4 INJECTION, SOLUTION INTRAMUSCULAR; INTRAVENOUS at 13:40

## 2021-08-30 RX ADMIN — METOROPROLOL TARTRATE 5 MG: 5 INJECTION, SOLUTION INTRAVENOUS at 22:56

## 2021-08-30 RX ADMIN — ALBUTEROL SULFATE 2 PUFF: 90 AEROSOL, METERED RESPIRATORY (INHALATION) at 18:34

## 2021-08-30 RX ADMIN — TRAZODONE HYDROCHLORIDE 50 MG: 50 TABLET ORAL at 21:26

## 2021-08-30 RX ADMIN — BENZONATATE 200 MG: 100 CAPSULE ORAL at 09:55

## 2021-08-30 RX ADMIN — DOXYCYCLINE 100 MG: 100 TABLET, FILM COATED ORAL at 13:40

## 2021-08-30 RX ADMIN — LORAZEPAM 1 MG: 1 TABLET ORAL at 13:04

## 2021-08-30 RX ADMIN — DEXAMETHASONE SODIUM PHOSPHATE 4 MG: 4 INJECTION, SOLUTION INTRAMUSCULAR; INTRAVENOUS at 21:27

## 2021-08-30 RX ADMIN — ALBUTEROL SULFATE 2 PUFF: 90 AEROSOL, METERED RESPIRATORY (INHALATION) at 23:38

## 2021-08-30 RX ADMIN — METOROPROLOL TARTRATE 5 MG: 5 INJECTION, SOLUTION INTRAVENOUS at 18:56

## 2021-08-30 RX ADMIN — RIVAROXABAN 20 MG: 20 TABLET, FILM COATED ORAL at 18:56

## 2021-08-30 RX ADMIN — Medication 1000 UNITS: at 09:55

## 2021-08-30 RX ADMIN — BUDESONIDE AND FORMOTEROL FUMARATE DIHYDRATE 2 PUFF: 160; 4.5 AEROSOL RESPIRATORY (INHALATION) at 18:38

## 2021-08-30 RX ADMIN — Medication 3 ML: at 09:55

## 2021-08-30 RX ADMIN — ALBUTEROL SULFATE 2 PUFF: 90 AEROSOL, METERED RESPIRATORY (INHALATION) at 03:14

## 2021-08-30 RX ADMIN — REMDESIVIR 100 MG: 100 INJECTION, POWDER, LYOPHILIZED, FOR SOLUTION INTRAVENOUS at 18:57

## 2021-08-30 RX ADMIN — BUDESONIDE AND FORMOTEROL FUMARATE DIHYDRATE 2 PUFF: 160; 4.5 AEROSOL RESPIRATORY (INHALATION) at 06:56

## 2021-08-30 RX ADMIN — ALBUTEROL SULFATE 2 PUFF: 90 AEROSOL, METERED RESPIRATORY (INHALATION) at 15:55

## 2021-08-30 RX ADMIN — BENZONATATE 200 MG: 100 CAPSULE ORAL at 21:26

## 2021-08-30 RX ADMIN — Medication 220 MG: at 09:55

## 2021-08-30 RX ADMIN — MONTELUKAST 10 MG: 10 TABLET, FILM COATED ORAL at 21:26

## 2021-08-30 RX ADMIN — METOROPROLOL TARTRATE 5 MG: 5 INJECTION, SOLUTION INTRAVENOUS at 13:04

## 2021-08-30 RX ADMIN — PANTOPRAZOLE SODIUM 40 MG: 40 TABLET, DELAYED RELEASE ORAL at 06:07

## 2021-08-30 RX ADMIN — Medication 3 ML: at 21:26

## 2021-08-30 RX ADMIN — CEFTRIAXONE SODIUM 1 G: 1 INJECTION, POWDER, FOR SOLUTION INTRAMUSCULAR; INTRAVENOUS at 13:39

## 2021-08-30 RX ADMIN — DOXYCYCLINE 100 MG: 100 TABLET, FILM COATED ORAL at 21:26

## 2021-08-30 RX ADMIN — ALBUTEROL SULFATE 2 PUFF: 90 AEROSOL, METERED RESPIRATORY (INHALATION) at 06:56

## 2021-08-30 RX ADMIN — FLUOXETINE 40 MG: 20 CAPSULE ORAL at 09:55

## 2021-08-30 RX ADMIN — OXYCODONE HYDROCHLORIDE AND ACETAMINOPHEN 500 MG: 500 TABLET ORAL at 09:55

## 2021-08-30 RX ADMIN — Medication 5 MG: at 22:56

## 2021-08-30 RX ADMIN — LEVOTHYROXINE SODIUM 112 MCG: 0.11 TABLET ORAL at 06:07

## 2021-08-30 RX ADMIN — LORAZEPAM 1 MG: 1 TABLET ORAL at 22:56

## 2021-08-31 ENCOUNTER — APPOINTMENT (OUTPATIENT)
Dept: GENERAL RADIOLOGY | Facility: HOSPITAL | Age: 56
End: 2021-08-31

## 2021-08-31 LAB
ALBUMIN SERPL-MCNC: 3.4 G/DL (ref 3.5–5.2)
ALBUMIN/GLOB SERPL: 1 G/DL
ALP SERPL-CCNC: 72 U/L (ref 39–117)
ALT SERPL W P-5'-P-CCNC: 23 U/L (ref 1–33)
ANION GAP SERPL CALCULATED.3IONS-SCNC: 13 MMOL/L (ref 5–15)
AST SERPL-CCNC: 38 U/L (ref 1–32)
BASOPHILS # BLD AUTO: 0 10*3/MM3 (ref 0–0.2)
BASOPHILS NFR BLD AUTO: 0.2 % (ref 0–1.5)
BILIRUB CONJ SERPL-MCNC: <0.2 MG/DL (ref 0–0.3)
BILIRUB SERPL-MCNC: 0.3 MG/DL (ref 0–1.2)
BUN SERPL-MCNC: 27 MG/DL (ref 6–20)
BUN/CREAT SERPL: 23.9 (ref 7–25)
CALCIUM SPEC-SCNC: 8.6 MG/DL (ref 8.6–10.5)
CHLORIDE SERPL-SCNC: 99 MMOL/L (ref 98–107)
CO2 SERPL-SCNC: 22 MMOL/L (ref 22–29)
CREAT SERPL-MCNC: 1.13 MG/DL (ref 0.57–1)
CRP SERPL-MCNC: 19.49 MG/DL (ref 0–0.5)
D DIMER PPP FEU-MCNC: 0.58 MG/L (FEU) (ref 0–0.59)
DEPRECATED RDW RBC AUTO: 45.1 FL (ref 37–54)
EOSINOPHIL # BLD AUTO: 0 10*3/MM3 (ref 0–0.4)
EOSINOPHIL NFR BLD AUTO: 0 % (ref 0.3–6.2)
ERYTHROCYTE [DISTWIDTH] IN BLOOD BY AUTOMATED COUNT: 14 % (ref 12.3–15.4)
GFR SERPL CREATININE-BSD FRML MDRD: 49 ML/MIN/1.73
GLOBULIN UR ELPH-MCNC: 3.3 GM/DL
GLUCOSE SERPL-MCNC: 152 MG/DL (ref 65–99)
HCT VFR BLD AUTO: 40.4 % (ref 34–46.6)
HGB BLD-MCNC: 13.7 G/DL (ref 12–15.9)
LYMPHOCYTES # BLD AUTO: 0.8 10*3/MM3 (ref 0.7–3.1)
LYMPHOCYTES NFR BLD AUTO: 8.9 % (ref 19.6–45.3)
MCH RBC QN AUTO: 31.8 PG (ref 26.6–33)
MCHC RBC AUTO-ENTMCNC: 33.9 G/DL (ref 31.5–35.7)
MCV RBC AUTO: 93.9 FL (ref 79–97)
MONOCYTES # BLD AUTO: 0.9 10*3/MM3 (ref 0.1–0.9)
MONOCYTES NFR BLD AUTO: 9.7 % (ref 5–12)
NEUTROPHILS NFR BLD AUTO: 7.3 10*3/MM3 (ref 1.7–7)
NEUTROPHILS NFR BLD AUTO: 81.2 % (ref 42.7–76)
NRBC BLD AUTO-RTO: 0 /100 WBC (ref 0–0.2)
PLATELET # BLD AUTO: 190 10*3/MM3 (ref 140–450)
PMV BLD AUTO: 9.4 FL (ref 6–12)
POTASSIUM SERPL-SCNC: 4.1 MMOL/L (ref 3.5–5.2)
PROT SERPL-MCNC: 6.7 G/DL (ref 6–8.5)
QT INTERVAL: 319 MS
RBC # BLD AUTO: 4.3 10*6/MM3 (ref 3.77–5.28)
SODIUM SERPL-SCNC: 134 MMOL/L (ref 136–145)
WBC # BLD AUTO: 9 10*3/MM3 (ref 3.4–10.8)

## 2021-08-31 PROCEDURE — 25010000002 DEXAMETHASONE PER 1 MG: Performed by: INTERNAL MEDICINE

## 2021-08-31 PROCEDURE — 82248 BILIRUBIN DIRECT: CPT | Performed by: INTERNAL MEDICINE

## 2021-08-31 PROCEDURE — 71045 X-RAY EXAM CHEST 1 VIEW: CPT

## 2021-08-31 PROCEDURE — 80053 COMPREHEN METABOLIC PANEL: CPT | Performed by: INTERNAL MEDICINE

## 2021-08-31 PROCEDURE — 99222 1ST HOSP IP/OBS MODERATE 55: CPT | Performed by: INTERNAL MEDICINE

## 2021-08-31 PROCEDURE — 25010000002 CEFTRIAXONE PER 250 MG: Performed by: INTERNAL MEDICINE

## 2021-08-31 PROCEDURE — 94799 UNLISTED PULMONARY SVC/PX: CPT

## 2021-08-31 PROCEDURE — 86140 C-REACTIVE PROTEIN: CPT | Performed by: INTERNAL MEDICINE

## 2021-08-31 PROCEDURE — 36415 COLL VENOUS BLD VENIPUNCTURE: CPT | Performed by: INTERNAL MEDICINE

## 2021-08-31 PROCEDURE — 85379 FIBRIN DEGRADATION QUANT: CPT | Performed by: INTERNAL MEDICINE

## 2021-08-31 PROCEDURE — 85025 COMPLETE CBC W/AUTO DIFF WBC: CPT | Performed by: INTERNAL MEDICINE

## 2021-08-31 RX ADMIN — REMDESIVIR 100 MG: 100 INJECTION, POWDER, LYOPHILIZED, FOR SOLUTION INTRAVENOUS at 18:15

## 2021-08-31 RX ADMIN — Medication 5 MG: at 21:16

## 2021-08-31 RX ADMIN — BUDESONIDE AND FORMOTEROL FUMARATE DIHYDRATE 2 PUFF: 160; 4.5 AEROSOL RESPIRATORY (INHALATION) at 18:35

## 2021-08-31 RX ADMIN — MONTELUKAST 10 MG: 10 TABLET, FILM COATED ORAL at 21:16

## 2021-08-31 RX ADMIN — ALBUTEROL SULFATE 2 PUFF: 90 AEROSOL, METERED RESPIRATORY (INHALATION) at 23:18

## 2021-08-31 RX ADMIN — Medication 1000 UNITS: at 10:12

## 2021-08-31 RX ADMIN — Medication 3 ML: at 21:16

## 2021-08-31 RX ADMIN — ALBUTEROL SULFATE 2 PUFF: 90 AEROSOL, METERED RESPIRATORY (INHALATION) at 10:26

## 2021-08-31 RX ADMIN — FLUOXETINE 40 MG: 20 CAPSULE ORAL at 10:12

## 2021-08-31 RX ADMIN — DOXYCYCLINE 100 MG: 100 TABLET, FILM COATED ORAL at 21:16

## 2021-08-31 RX ADMIN — LEVOTHYROXINE SODIUM 112 MCG: 0.11 TABLET ORAL at 06:29

## 2021-08-31 RX ADMIN — CEFTRIAXONE SODIUM 1 G: 1 INJECTION, POWDER, FOR SOLUTION INTRAMUSCULAR; INTRAVENOUS at 14:26

## 2021-08-31 RX ADMIN — Medication 220 MG: at 10:12

## 2021-08-31 RX ADMIN — DILTIAZEM HYDROCHLORIDE 180 MG: 180 CAPSULE, COATED, EXTENDED RELEASE ORAL at 00:34

## 2021-08-31 RX ADMIN — ALBUTEROL SULFATE 2 PUFF: 90 AEROSOL, METERED RESPIRATORY (INHALATION) at 07:40

## 2021-08-31 RX ADMIN — Medication 3 ML: at 10:12

## 2021-08-31 RX ADMIN — OXYCODONE HYDROCHLORIDE AND ACETAMINOPHEN 500 MG: 500 TABLET ORAL at 10:12

## 2021-08-31 RX ADMIN — RIVAROXABAN 20 MG: 20 TABLET, FILM COATED ORAL at 18:14

## 2021-08-31 RX ADMIN — TRAZODONE HYDROCHLORIDE 50 MG: 50 TABLET ORAL at 21:16

## 2021-08-31 RX ADMIN — DOXYCYCLINE 100 MG: 100 TABLET, FILM COATED ORAL at 10:12

## 2021-08-31 RX ADMIN — DEXAMETHASONE SODIUM PHOSPHATE 4 MG: 4 INJECTION, SOLUTION INTRAMUSCULAR; INTRAVENOUS at 21:16

## 2021-08-31 RX ADMIN — ALBUTEROL SULFATE 2 PUFF: 90 AEROSOL, METERED RESPIRATORY (INHALATION) at 18:34

## 2021-08-31 RX ADMIN — DEXAMETHASONE SODIUM PHOSPHATE 4 MG: 4 INJECTION, SOLUTION INTRAMUSCULAR; INTRAVENOUS at 10:12

## 2021-08-31 RX ADMIN — PANTOPRAZOLE SODIUM 40 MG: 40 TABLET, DELAYED RELEASE ORAL at 06:29

## 2021-08-31 RX ADMIN — HYDROCODONE POLISTIREX AND CHLORPHENIRAMINE POLISTIREX 5 ML: 10; 8 SUSPENSION, EXTENDED RELEASE ORAL at 18:14

## 2021-08-31 RX ADMIN — ALBUTEROL SULFATE 2 PUFF: 90 AEROSOL, METERED RESPIRATORY (INHALATION) at 15:39

## 2021-08-31 RX ADMIN — HYDROCODONE POLISTIREX AND CHLORPHENIRAMINE POLISTIREX 5 ML: 10; 8 SUSPENSION, EXTENDED RELEASE ORAL at 00:34

## 2021-08-31 RX ADMIN — BUDESONIDE AND FORMOTEROL FUMARATE DIHYDRATE 2 PUFF: 160; 4.5 AEROSOL RESPIRATORY (INHALATION) at 07:40

## 2021-09-01 ENCOUNTER — APPOINTMENT (OUTPATIENT)
Dept: GENERAL RADIOLOGY | Facility: HOSPITAL | Age: 56
End: 2021-09-01

## 2021-09-01 PROBLEM — R09.02 HYPOXIA: Status: ACTIVE | Noted: 2021-09-01

## 2021-09-01 LAB
ALBUMIN SERPL-MCNC: 3.2 G/DL (ref 3.5–5.2)
ALBUMIN/GLOB SERPL: 1 G/DL
ALP SERPL-CCNC: 66 U/L (ref 39–117)
ALT SERPL W P-5'-P-CCNC: 21 U/L (ref 1–33)
ANION GAP SERPL CALCULATED.3IONS-SCNC: 11 MMOL/L (ref 5–15)
ARTERIAL PATENCY WRIST A: POSITIVE
AST SERPL-CCNC: 30 U/L (ref 1–32)
ATMOSPHERIC PRESS: ABNORMAL MM[HG]
BASE EXCESS BLDA CALC-SCNC: -2.7 MMOL/L (ref 0–3)
BASOPHILS # BLD AUTO: 0 10*3/MM3 (ref 0–0.2)
BASOPHILS NFR BLD AUTO: 0.1 % (ref 0–1.5)
BDY SITE: ABNORMAL
BILIRUB CONJ SERPL-MCNC: <0.2 MG/DL (ref 0–0.3)
BILIRUB SERPL-MCNC: 0.3 MG/DL (ref 0–1.2)
BUN SERPL-MCNC: 36 MG/DL (ref 6–20)
BUN/CREAT SERPL: 33 (ref 7–25)
CALCIUM SPEC-SCNC: 8.2 MG/DL (ref 8.6–10.5)
CHLORIDE SERPL-SCNC: 99 MMOL/L (ref 98–107)
CO2 BLDA-SCNC: 21.7 MMOL/L (ref 22–29)
CO2 SERPL-SCNC: 23 MMOL/L (ref 22–29)
CREAT SERPL-MCNC: 1.09 MG/DL (ref 0.57–1)
CRP SERPL-MCNC: 10.03 MG/DL (ref 0–0.5)
D DIMER PPP FEU-MCNC: 0.53 MG/L (FEU) (ref 0–0.59)
DEPRECATED RDW RBC AUTO: 44.2 FL (ref 37–54)
EOSINOPHIL # BLD AUTO: 0 10*3/MM3 (ref 0–0.4)
EOSINOPHIL NFR BLD AUTO: 0 % (ref 0.3–6.2)
ERYTHROCYTE [DISTWIDTH] IN BLOOD BY AUTOMATED COUNT: 13.9 % (ref 12.3–15.4)
GFR SERPL CREATININE-BSD FRML MDRD: 52 ML/MIN/1.73
GLOBULIN UR ELPH-MCNC: 3.1 GM/DL
GLUCOSE BLDC GLUCOMTR-MCNC: 144 MG/DL (ref 70–105)
GLUCOSE BLDC GLUCOMTR-MCNC: 150 MG/DL (ref 70–105)
GLUCOSE BLDC GLUCOMTR-MCNC: 157 MG/DL (ref 70–105)
GLUCOSE SERPL-MCNC: 148 MG/DL (ref 65–99)
HCO3 BLDA-SCNC: 20.7 MMOL/L (ref 21–28)
HCT VFR BLD AUTO: 38.9 % (ref 34–46.6)
HEMODILUTION: NO
HGB BLD-MCNC: 13.4 G/DL (ref 12–15.9)
INHALED O2 CONCENTRATION: 100 %
LYMPHOCYTES # BLD AUTO: 0.9 10*3/MM3 (ref 0.7–3.1)
LYMPHOCYTES NFR BLD AUTO: 8.2 % (ref 19.6–45.3)
MCH RBC QN AUTO: 32.3 PG (ref 26.6–33)
MCHC RBC AUTO-ENTMCNC: 34.5 G/DL (ref 31.5–35.7)
MCV RBC AUTO: 93.6 FL (ref 79–97)
MODALITY: ABNORMAL
MONOCYTES # BLD AUTO: 1.2 10*3/MM3 (ref 0.1–0.9)
MONOCYTES NFR BLD AUTO: 10.1 % (ref 5–12)
NEUTROPHILS NFR BLD AUTO: 81.6 % (ref 42.7–76)
NEUTROPHILS NFR BLD AUTO: 9.4 10*3/MM3 (ref 1.7–7)
NRBC BLD AUTO-RTO: 0 /100 WBC (ref 0–0.2)
PCO2 BLDA: 31.9 MM HG (ref 35–48)
PH BLDA: 7.42 PH UNITS (ref 7.35–7.45)
PLATELET # BLD AUTO: 218 10*3/MM3 (ref 140–450)
PMV BLD AUTO: 9.5 FL (ref 6–12)
PO2 BLDA: 50.4 MM HG (ref 83–108)
POTASSIUM SERPL-SCNC: 4.3 MMOL/L (ref 3.5–5.2)
PROT SERPL-MCNC: 6.3 G/DL (ref 6–8.5)
RBC # BLD AUTO: 4.16 10*6/MM3 (ref 3.77–5.28)
SAO2 % BLDCOA: 86.5 % (ref 94–98)
SODIUM SERPL-SCNC: 133 MMOL/L (ref 136–145)
WBC # BLD AUTO: 11.5 10*3/MM3 (ref 3.4–10.8)

## 2021-09-01 PROCEDURE — 36415 COLL VENOUS BLD VENIPUNCTURE: CPT | Performed by: INTERNAL MEDICINE

## 2021-09-01 PROCEDURE — 94799 UNLISTED PULMONARY SVC/PX: CPT

## 2021-09-01 PROCEDURE — 85025 COMPLETE CBC W/AUTO DIFF WBC: CPT | Performed by: INTERNAL MEDICINE

## 2021-09-01 PROCEDURE — 25010000002 FUROSEMIDE PER 20 MG: Performed by: NURSE PRACTITIONER

## 2021-09-01 PROCEDURE — 25010000002 CEFTRIAXONE PER 250 MG: Performed by: INTERNAL MEDICINE

## 2021-09-01 PROCEDURE — 36600 WITHDRAWAL OF ARTERIAL BLOOD: CPT

## 2021-09-01 PROCEDURE — 25010000002 DEXAMETHASONE PER 1 MG: Performed by: NURSE PRACTITIONER

## 2021-09-01 PROCEDURE — 82962 GLUCOSE BLOOD TEST: CPT

## 2021-09-01 PROCEDURE — 85379 FIBRIN DEGRADATION QUANT: CPT | Performed by: INTERNAL MEDICINE

## 2021-09-01 PROCEDURE — 82248 BILIRUBIN DIRECT: CPT | Performed by: INTERNAL MEDICINE

## 2021-09-01 PROCEDURE — 82803 BLOOD GASES ANY COMBINATION: CPT

## 2021-09-01 PROCEDURE — 80053 COMPREHEN METABOLIC PANEL: CPT | Performed by: INTERNAL MEDICINE

## 2021-09-01 PROCEDURE — 86140 C-REACTIVE PROTEIN: CPT | Performed by: INTERNAL MEDICINE

## 2021-09-01 PROCEDURE — 71045 X-RAY EXAM CHEST 1 VIEW: CPT

## 2021-09-01 RX ORDER — DEXAMETHASONE SODIUM PHOSPHATE 4 MG/ML
6 INJECTION, SOLUTION INTRA-ARTICULAR; INTRALESIONAL; INTRAMUSCULAR; INTRAVENOUS; SOFT TISSUE EVERY 12 HOURS
Status: DISCONTINUED | OUTPATIENT
Start: 2021-09-01 | End: 2021-09-08

## 2021-09-01 RX ORDER — FUROSEMIDE 10 MG/ML
20 INJECTION INTRAMUSCULAR; INTRAVENOUS ONCE
Status: DISCONTINUED | OUTPATIENT
Start: 2021-09-01 | End: 2021-09-01

## 2021-09-01 RX ORDER — DEXTROSE MONOHYDRATE 25 G/50ML
25 INJECTION, SOLUTION INTRAVENOUS
Status: DISCONTINUED | OUTPATIENT
Start: 2021-09-01 | End: 2021-09-10 | Stop reason: HOSPADM

## 2021-09-01 RX ORDER — NICOTINE POLACRILEX 4 MG
15 LOZENGE BUCCAL
Status: DISCONTINUED | OUTPATIENT
Start: 2021-09-01 | End: 2021-09-10 | Stop reason: HOSPADM

## 2021-09-01 RX ORDER — INSULIN LISPRO 100 [IU]/ML
0-9 INJECTION, SOLUTION INTRAVENOUS; SUBCUTANEOUS
Status: DISCONTINUED | OUTPATIENT
Start: 2021-09-01 | End: 2021-09-10 | Stop reason: HOSPADM

## 2021-09-01 RX ORDER — FUROSEMIDE 10 MG/ML
40 INJECTION INTRAMUSCULAR; INTRAVENOUS ONCE
Status: DISCONTINUED | OUTPATIENT
Start: 2021-09-01 | End: 2021-09-01

## 2021-09-01 RX ORDER — OLANZAPINE 10 MG/2ML
1 INJECTION, POWDER, LYOPHILIZED, FOR SOLUTION INTRAMUSCULAR
Status: DISCONTINUED | OUTPATIENT
Start: 2021-09-01 | End: 2021-09-10 | Stop reason: HOSPADM

## 2021-09-01 RX ORDER — FUROSEMIDE 10 MG/ML
20 INJECTION INTRAMUSCULAR; INTRAVENOUS EVERY 12 HOURS
Status: DISCONTINUED | OUTPATIENT
Start: 2021-09-01 | End: 2021-09-10 | Stop reason: HOSPADM

## 2021-09-01 RX ORDER — INSULIN LISPRO 100 [IU]/ML
0-9 INJECTION, SOLUTION INTRAVENOUS; SUBCUTANEOUS AS NEEDED
Status: DISCONTINUED | OUTPATIENT
Start: 2021-09-01 | End: 2021-09-10 | Stop reason: HOSPADM

## 2021-09-01 RX ADMIN — ALBUTEROL SULFATE 2 PUFF: 90 AEROSOL, METERED RESPIRATORY (INHALATION) at 16:05

## 2021-09-01 RX ADMIN — Medication 220 MG: at 09:56

## 2021-09-01 RX ADMIN — REMDESIVIR 100 MG: 100 INJECTION, POWDER, LYOPHILIZED, FOR SOLUTION INTRAVENOUS at 18:04

## 2021-09-01 RX ADMIN — LEVOTHYROXINE SODIUM 112 MCG: 0.11 TABLET ORAL at 06:00

## 2021-09-01 RX ADMIN — DILTIAZEM HYDROCHLORIDE 180 MG: 180 CAPSULE, COATED, EXTENDED RELEASE ORAL at 09:56

## 2021-09-01 RX ADMIN — FUROSEMIDE 20 MG: 10 INJECTION INTRAMUSCULAR; INTRAVENOUS at 09:56

## 2021-09-01 RX ADMIN — CEFTRIAXONE SODIUM 1 G: 1 INJECTION, POWDER, FOR SOLUTION INTRAMUSCULAR; INTRAVENOUS at 14:14

## 2021-09-01 RX ADMIN — ALBUTEROL SULFATE 2 PUFF: 90 AEROSOL, METERED RESPIRATORY (INHALATION) at 12:25

## 2021-09-01 RX ADMIN — LORAZEPAM 1 MG: 1 TABLET ORAL at 14:14

## 2021-09-01 RX ADMIN — ALBUTEROL SULFATE 2 PUFF: 90 AEROSOL, METERED RESPIRATORY (INHALATION) at 02:57

## 2021-09-01 RX ADMIN — HYDROCODONE POLISTIREX AND CHLORPHENIRAMINE POLISTIREX 5 ML: 10; 8 SUSPENSION, EXTENDED RELEASE ORAL at 09:57

## 2021-09-01 RX ADMIN — DEXAMETHASONE SODIUM PHOSPHATE 6 MG: 4 INJECTION, SOLUTION INTRAMUSCULAR; INTRAVENOUS at 20:30

## 2021-09-01 RX ADMIN — Medication 3 ML: at 21:00

## 2021-09-01 RX ADMIN — DEXAMETHASONE SODIUM PHOSPHATE 6 MG: 4 INJECTION, SOLUTION INTRAMUSCULAR; INTRAVENOUS at 09:56

## 2021-09-01 RX ADMIN — ALBUTEROL SULFATE 2 PUFF: 90 AEROSOL, METERED RESPIRATORY (INHALATION) at 20:35

## 2021-09-01 RX ADMIN — LORAZEPAM 1 MG: 1 TABLET ORAL at 20:31

## 2021-09-01 RX ADMIN — OXYCODONE HYDROCHLORIDE AND ACETAMINOPHEN 500 MG: 500 TABLET ORAL at 09:55

## 2021-09-01 RX ADMIN — RIVAROXABAN 20 MG: 20 TABLET, FILM COATED ORAL at 18:04

## 2021-09-01 RX ADMIN — FUROSEMIDE 20 MG: 10 INJECTION INTRAMUSCULAR; INTRAVENOUS at 22:29

## 2021-09-01 RX ADMIN — Medication 1000 UNITS: at 09:55

## 2021-09-01 RX ADMIN — BUDESONIDE AND FORMOTEROL FUMARATE DIHYDRATE 2 PUFF: 160; 4.5 AEROSOL RESPIRATORY (INHALATION) at 20:35

## 2021-09-01 RX ADMIN — DOXYCYCLINE 100 MG: 100 TABLET, FILM COATED ORAL at 20:31

## 2021-09-01 RX ADMIN — DOXYCYCLINE 100 MG: 100 TABLET, FILM COATED ORAL at 09:56

## 2021-09-01 RX ADMIN — Medication 5 MG: at 20:31

## 2021-09-01 RX ADMIN — DEXAMETHASONE SODIUM PHOSPHATE 6 MG: 4 INJECTION, SOLUTION INTRAMUSCULAR; INTRAVENOUS at 22:28

## 2021-09-01 RX ADMIN — TRAZODONE HYDROCHLORIDE 50 MG: 50 TABLET ORAL at 20:31

## 2021-09-01 RX ADMIN — Medication 3 ML: at 09:57

## 2021-09-01 RX ADMIN — MONTELUKAST 10 MG: 10 TABLET, FILM COATED ORAL at 20:31

## 2021-09-01 RX ADMIN — FLUOXETINE 40 MG: 20 CAPSULE ORAL at 09:56

## 2021-09-01 RX ADMIN — HYDROCODONE POLISTIREX AND CHLORPHENIRAMINE POLISTIREX 5 ML: 10; 8 SUSPENSION, EXTENDED RELEASE ORAL at 20:30

## 2021-09-01 RX ADMIN — BUDESONIDE AND FORMOTEROL FUMARATE DIHYDRATE 2 PUFF: 160; 4.5 AEROSOL RESPIRATORY (INHALATION) at 08:15

## 2021-09-01 RX ADMIN — METOROPROLOL TARTRATE 5 MG: 5 INJECTION, SOLUTION INTRAVENOUS at 09:56

## 2021-09-01 RX ADMIN — PANTOPRAZOLE SODIUM 40 MG: 40 TABLET, DELAYED RELEASE ORAL at 06:00

## 2021-09-01 RX ADMIN — ALBUTEROL SULFATE 2 PUFF: 90 AEROSOL, METERED RESPIRATORY (INHALATION) at 08:14

## 2021-09-01 NOTE — CASE MANAGEMENT/SOCIAL WORK
Continued Stay Note   [facility]     Patient Name: Danita Montiel  MRN: [id number]  Today's Date: 9/1/2021    Admit Date: 8/29/2021    Discharge Plan     Row Name 09/01/21 1304       Plan    Plan Comments  DC barriers: PF 40/100        Expected Discharge Date and Time     Expected Discharge Date Expected Discharge Time    Sep 3, 2021         Phone communication or documentation only - no physical contact with patient or family.      Ammy Guillen RN

## 2021-09-01 NOTE — PROGRESS NOTES
" LOS: 3 days   Patient Care Team:  Jaya Harper MD as PCP - General  GriefJaya MD as PCP - Family Medicine    Subjective     Interval History:    Patient Complaints:  soa RUQ pain     History taken from: patient    Review of Systems   Constitutional: Positive for activity change and appetite change.   HENT: Negative for trouble swallowing.    Eyes: Negative for visual disturbance.   Respiratory: Positive for cough, chest tightness and shortness of breath.    Cardiovascular: Negative for chest pain, palpitations and leg swelling.   Gastrointestinal: Positive for abdominal pain (RUQ- \" since this all started\") and nausea. Negative for diarrhea and vomiting.   Genitourinary: Negative for difficulty urinating.   Musculoskeletal: Positive for back pain.   Neurological: Positive for weakness and headaches. Negative for tremors and syncope.   Psychiatric/Behavioral: Negative for confusion. The patient is nervous/anxious.            Objective     Vital Signs  Temp:  [97.3 °F (36.3 °C)-97.9 °F (36.6 °C)] 97.4 °F (36.3 °C)  Heart Rate:  [] 103  Resp:  [18-30] 24  BP: (105-135)/(55-82) 135/82    Physical Exam:     General Appearance:    In respiratory distress   Head:    Normocephalic, without obvious abnormality, atraumatic   Eyes:            Lids and lashes normal, conjunctivae and sclerae normal, no   icterus, no pallor, corneas clear, PERRLA   Ears:    Ears appear intact with no abnormalities noted   Throat:   No oral lesions, no thrush, oral mucosa moist   Neck:   No adenopathy, supple, trachea midline, no thyromegaly, no   carotid bruit, no JVD   Lungs:     Respirations are labored. Diminished throughout     Heart:    Tachycardia- rate    Chest Wall:    No abnormalities observed   Abdomen:     Normal bowel sounds, no mass- TTP the RUQ- non-distended, no guarding,   Extremities:   Moves all extremities well, no edema,  no cyanosis, no             Redness   Pulses:   Pulses palpable and equal " bilaterally   Skin:   No bleeding, bruising or rash   Lymph nodes:   No palpable adenopathy   Neurologic:   Cranial nerves 2 - 12 grossly intact, sensation intact, DTR       present and equal bilaterally        Results Review:    Lab Results (last 24 hours)     Procedure Component Value Units Date/Time    Blood Gas, Arterial - [[id number]]  (Abnormal) Collected: 09/01/21 0855    Specimen: Arterial Blood Updated: 09/01/21 0858     Site Right Radial     Jose's Test Positive     pH, Arterial 7.421 pH units      pCO2, Arterial 31.9 mm Hg      pO2, Arterial 50.4 mm Hg      HCO3, Arterial 20.7 mmol/L      Base Excess, Arterial -2.7 mmol/L      Comment: Serial Number: [id number]:  [id number]        O2 Saturation, Arterial 86.5 %      CO2 Content 21.7 mmol/L      Barometric Pressure for Blood Gas --     Comment: N/A        Modality NRB     FIO2 100 %      Hemodilution No    Comprehensive Metabolic Panel [[id number]]  (Abnormal) Collected: 09/01/21 0327    Specimen: Blood Updated: 09/01/21 0414     Glucose 148 mg/dL      BUN 36 mg/dL      Creatinine 1.09 mg/dL      Sodium 133 mmol/L      Potassium 4.3 mmol/L      Chloride 99 mmol/L      CO2 23.0 mmol/L      Calcium 8.2 mg/dL      Total Protein 6.3 g/dL      Albumin 3.20 g/dL      ALT (SGPT) 21 U/L      AST (SGOT) 30 U/L      Alkaline Phosphatase 66 U/L      Total Bilirubin 0.3 mg/dL      eGFR Non African Amer 52 mL/min/1.73      Globulin 3.1 gm/dL      A/G Ratio 1.0 g/dL      BUN/Creatinine Ratio 33.0     Anion Gap 11.0 mmol/L     Narrative:      GFR Normal >60  Chronic Kidney Disease <60  Kidney Failure <15      Bilirubin, Direct [[id number]]  (Normal) Collected: 09/01/21 0327    Specimen: Blood Updated: 09/01/21 0414     Bilirubin, Direct <0.2 mg/dL     C-reactive Protein [[id number]]  (Abnormal) Collected: 09/01/21 0327    Specimen: Blood Updated: 09/01/21 0414     C-Reactive Protein 10.03 mg/dL     D-dimer, Quantitative [[id number]]  (Normal) Collected: 09/01/21 0327     Specimen: Blood Updated: 09/01/21 0356     D-Dimer, Quantitative 0.53 mg/L (FEU)     Narrative:      Reference Range  --------------------------------------------------------------------     < 0.50   Negative Predictive Value  0.50-0.59   Indeterminate    >= 0.60   Probable VTE             A very low percentage of patients with DVT may yield D-Dimer results   below the cut-off of 0.50 mg/L FEU.  This is known to be more   prevalent in patients with distal DVT.             Results of this test should always be interpreted in conjunction with   the patient's medical history, clinical presentation and other   findings.  Clinical diagnosis should not be based on the result of   [place] D-Dimer alone.    CBC & Differential [[id number]]  (Abnormal) Collected: 09/01/21 0327    Specimen: Blood Updated: 09/01/21 0339    Narrative:      The following orders were created for panel order CBC & Differential.  Procedure                               Abnormality         Status                     ---------                               -----------         ------                     CBC Auto Differential[[id number]]        Abnormal            Final result                 Please view results for these tests on the individual orders.    CBC Auto Differential [[id number]]  (Abnormal) Collected: 09/01/21 0327    Specimen: Blood Updated: 09/01/21 0339     WBC 11.50 10*3/mm3      RBC 4.16 10*6/mm3      Hemoglobin 13.4 g/dL      Hematocrit 38.9 %      MCV 93.6 fL      MCH 32.3 pg      MCHC 34.5 g/dL      RDW 13.9 %      RDW-SD 44.2 fl      MPV 9.5 fL      Platelets 218 10*3/mm3      Neutrophil % 81.6 %      Lymphocyte % 8.2 %      Monocyte % 10.1 %      Eosinophil % 0.0 %      Basophil % 0.1 %      Neutrophils, Absolute 9.40 10*3/mm3      Lymphocytes, Absolute 0.90 10*3/mm3      Monocytes, Absolute 1.20 10*3/mm3      Eosinophils, Absolute 0.00 10*3/mm3      Basophils, Absolute 0.00 10*3/mm3      nRBC 0.0 /100 WBC     Blood Culture - Blood,  Arm, Right [[id number]] Collected: 08/30/21 1549    Specimen: Blood from Arm, Right Updated: 08/31/21 1630     Blood Culture No growth at 24 hours    Blood Culture - Blood, Hand, Left [[id number]] Collected: 08/30/21 1549    Specimen: Blood from Hand, Left Updated: 08/31/21 1630     Blood Culture No growth at 24 hours           Imaging Results (Last 24 Hours)     Procedure Component Value Units Date/Time    XR Chest 1 View [[id number]] Collected: 09/01/21 0759     Updated: 09/01/21 0802    Narrative:      XR CHEST 1 VW-     Date of Exam: 9/1/2021 5:56 AM     Indication: Shortness of breath; R06.00-Dyspnea, unspecified;  U07.1-COVID-19; U07.1-COVID-19; J12.82-Pneumonia due to Coronavirus  disease 2019; R09.02-Hypoxemia.     Comparison Exams: August 31, 2021     Technique: Single AP chest radiograph     FINDINGS:  Patchy bilateral airspace opacities appear slightly worse on the left.  The heart and mediastinal contours appear stable. There is a small left  pleural effusion. The osseous structures appear intact.       Impression:      1.Patchy bilateral airspace opacities appear slightly worse on the left,  suggesting pneumonia.  2.Small left pleural effusion.     Electronically Signed By-Alex Adame MD On:9/1/2021 7:59 AM  This report was finalized on [id number] by  Alex Adame MD.               I reviewed the patient's new clinical results.    Medication Review:   Scheduled Meds:albuterol sulfate HFA, 2 puff, Inhalation, Q4H - RT  ascorbic acid, 500 mg, Oral, Daily  budesonide-formoterol, 2 puff, Inhalation, BID - RT  cefTRIAXone, 1 g, Intravenous, Q24H  cholecalciferol, 1,000 Units, Oral, Daily  dexamethasone, 6 mg, Intravenous, Q12H  dilTIAZem CD, 180 mg, Oral, Q24H  doxycycline, 100 mg, Oral, Q12H  FLUoxetine, 40 mg, Oral, Daily  furosemide, 20 mg, Intravenous, Once  insulin lispro, 0-9 Units, Subcutaneous, TID AC  levothyroxine, 112 mcg, Oral, Q AM  montelukast, 10 mg, Oral, Nightly  pantoprazole, 40  mg, Oral, QAM  remdesivir, 100 mg, Intravenous, Q24H  rivaroxaban, 20 mg, Oral, Daily With Dinner  sodium chloride, 3 mL, Intravenous, Q12H  traZODone, 50 mg, Oral, Nightly  zinc sulfate, 220 mg, Oral, Daily      Continuous Infusions:Pharmacy Consult - Remdesivir,       PRN Meds:.benzonatate  •  dextrose  •  dextrose  •  glucagon (human recombinant)  •  HYDROcod Polst-CPM Polst ER  •  insulin lispro **AND** insulin lispro  •  LORazepam  •  melatonin  •  metoprolol tartrate  •  ondansetron  •  Pharmacy Consult - Remdesivir  •  sodium chloride  •  [COMPLETED] Insert peripheral IV **AND** sodium chloride  •  sodium chloride     Assessment/Plan       Pneumonia due to COVID-19 virus    Covid Pneumonia- CXR worsening with L pleural effusion. Continue remesivir, rocephin, doxy , bronchodilators     Hypoxia- abg with po2 down to 50 on PF 40L 100% - sats drop to 85 with any movement .  Lasix 20 mg IV  BID - increase decadron to 6 q 12-      Afib w RVR- metoprolol prn -pt is on po Cardizem- anticoagulated  With xarelto -  cardio following.     RUQ pain - Abd exam is benign. This can be musculoskeletal from coughing. LFTs remain normal .  No pneumo on CXR. Will monitor.    Plan for disposition: TOMMY Chavez  09/01/21  09:21 EDT

## 2021-09-01 NOTE — PROGRESS NOTES
"PULMONARY CRITICAL CARE [facility]  NOTE      PATIENT IDENTIFICATION:  Name: Danita Montiel  MRN: [id number]  :  1965     Age: 56 y.o.  Sex: female    DATE OF Note:  2021   Referring Physician: Korin Lopez MD                  Subjective:   Currently on AVAPS,  no chest pain, no bowel or bladder issues    Objective:  tMax 24 hrs: Temp (24hrs), Av.6 °F (36.4 °C), Min:97.3 °F (36.3 °C), Max:97.8 °F (36.6 °C)      Vitals Ranges:   Temp:  [97.3 °F (36.3 °C)-97.8 °F (36.6 °C)] 97.4 °F (36.3 °C)  Heart Rate:  [] 86  Resp:  [18-32] 28  BP: (100-135)/(60-82) 100/66    Intake and Output Last 3 Shifts:   I/O last 3 completed shifts:  In: 960 [P.O.:960]  Out: 350 [Urine:350]    Exam:  /66   Pulse 86   Temp 97.4 °F (36.3 °C) (Axillary)   Resp 28   Ht 167.6 cm (66\")   Wt 117 kg (257 lb 0.9 oz)   LMP  (LMP Unknown)   SpO2 91%   BMI 41.49 kg/m²     General Appearance: Alert    HEENT:  Normocephalic, without obvious abnormality, Conjunctiva/corneas clear,.  Normal external ear canals, Nares normal, no drainage     Neck:  Supple, symmetrical, trachea midline. No JVD.  Lungs /Chest wall:   Bilateral basal rhonchi, respirations unlabored symmetrical wall movement.     Heart:  Regular rate and rhythm, systolic murmur PMI left sternal border  Abdomen: Soft, non-tender, no masses, no organomegaly.    Extremities: Trace edema no clubbing or Cyanosis        Medications:    Current Facility-Administered Medications:   •  albuterol sulfate HFA (PROVENTIL HFA;VENTOLIN HFA;PROAIR HFA) inhaler 2 puff, 2 puff, Inhalation, Q4H - RT, Korin Lopez MD, 2 puff at 21 1225  •  ascorbic acid (VITAMIN C) tablet 500 mg, 500 mg, Oral, Daily, Korin Lopez MD, 500 mg at 21 0955  •  benzonatate (TESSALON) capsule 200 mg, 200 mg, Oral, TID PRN, Korin Lopez MD, 200 mg at 21  •  budesonide-formoterol (SYMBICORT) 160-4.5 MCG/ACT inhaler 2 puff, 2 puff, Inhalation, BID - RT, Mathieu Mars MD, 2 puff at " 09/01/21 0815  •  cefTRIAXone (ROCEPHIN) 1 g in sodium chloride 0.9 % 100 mL IVPB, 1 g, Intravenous, Q24H, Korin Lopez MD, Last Rate: 200 mL/hr at 09/01/21 1414, 1 g at 09/01/21 1414  •  cholecalciferol (VITAMIN D3) tablet 1,000 Units, 1,000 Units, Oral, Daily, Korin Lopez MD, 1,000 Units at 09/01/21 0955  •  dexamethasone (DECADRON) injection 6 mg, 6 mg, Intravenous, Q12H, Jessica Donis APRN, 6 mg at 09/01/21 0956  •  dextrose (D50W) 25 g/ 50mL Intravenous Solution 25 g, 25 g, Intravenous, Q15 Min PRN, Jessica Donis APRN  •  dextrose (GLUTOSE) oral gel 15 g, 15 g, Oral, Q15 Min PRN, Jessica Donis APRN  •  dilTIAZem CD (CARDIZEM CD) 24 hr capsule 180 mg, 180 mg, Oral, Q24H, Ene Lewis, APRN, 180 mg at 09/01/21 0956  •  doxycycline (ADOXA) tablet 100 mg, 100 mg, Oral, Q12H, Korin Lopez MD, 100 mg at 09/01/21 0956  •  FLUoxetine (PROzac) capsule 40 mg, 40 mg, Oral, Daily, Korin Lopez MD, 40 mg at 09/01/21 0956  •  furosemide (LASIX) injection 20 mg, 20 mg, Intravenous, Q12H, Jessica Donis APRN, 20 mg at 09/01/21 0956  •  glucagon (human recombinant) (GLUCAGEN DIAGNOSTIC) 1 mg in sterile water (preservative free) 1 mL injection, 1 mg, Subcutaneous, Q15 Min PRN, Jessica Donis APRN  •  HYDROcod Polst-CPM Polst ER (TUSSIONEX PENNKINETIC) 10-8 MG/5ML ER suspension 5 mL, 5 mL, Oral, Q12H PRN, Mathieu Mars MD, 5 mL at 09/01/21 0957  •  insulin lispro (ADMELOG) injection 0-9 Units, 0-9 Units, Subcutaneous, TID AC **AND** insulin lispro (ADMELOG) injection 0-9 Units, 0-9 Units, Subcutaneous, PRN, Jessica Donis APRN  •  levothyroxine (SYNTHROID, LEVOTHROID) tablet 112 mcg, 112 mcg, Oral, Q AM, Korin Lopez MD, 112 mcg at 09/01/21 0600  •  LORazepam (ATIVAN) tablet 1 mg, 1 mg, Oral, Q6H PRN, Korin Lopez MD, 1 mg at 09/01/21 1414  •  melatonin tablet 5 mg, 5 mg, Oral, Nightly PRN, Korin Lopez MD, 5 mg at 08/31/21 2116  •  metoprolol tartrate (LOPRESSOR) injection 5 mg, 5 mg, Intravenous,  Q4H PRN, Korin Lopez MD, 5 mg at 09/01/21 0956  •  montelukast (SINGULAIR) tablet 10 mg, 10 mg, Oral, Nightly, Korin Lopez MD, 10 mg at 08/31/21 2116  •  ondansetron (ZOFRAN) tablet 4 mg, 4 mg, Oral, Q6H PRN, Korin Lopez MD  •  pantoprazole (PROTONIX) EC tablet 40 mg, 40 mg, Oral, QAM, Korin Lopez MD, 40 mg at 09/01/21 0600  •  Pharmacy Consult - Remdesivir, , Does not apply, Continuous PRN, Korin Lopez MD  •  [COMPLETED] remdesivir 200 mg in sodium chloride 0.9 % 290 mL IVPB (powder vial), 200 mg, Intravenous, Q24H, 200 mg at 08/29/21 1919 **FOLLOWED BY** remdesivir 100 mg in sodium chloride 0.9 % 250 mL IVPB (powder vial), 100 mg, Intravenous, Q24H, Korin Lopez MD, 100 mg at 08/31/21 1815  •  rivaroxaban (XARELTO) tablet 20 mg, 20 mg, Oral, Daily With Dinner, Korin Lopez MD, 20 mg at 08/31/21 1814  •  sodium chloride 0.9 % flush 10 mL, 10 mL, Intravenous, PRN, Korin Lopez MD  •  [COMPLETED] Insert peripheral IV, , , Once **AND** sodium chloride 0.9 % flush 10 mL, 10 mL, Intravenous, PRN, Korin Lopez MD  •  sodium chloride 0.9 % flush 3 mL, 3 mL, Intravenous, Q12H, Korin Lopez MD, 3 mL at 09/01/21 0957  •  sodium chloride 0.9 % flush 3-10 mL, 3-10 mL, Intravenous, PRN, Korin Lopez MD  •  traZODone (DESYREL) tablet 50 mg, 50 mg, Oral, Nightly, Korin Lopez MD, 50 mg at 08/31/21 2116  •  zinc sulfate (ZINCATE) capsule 220 mg, 220 mg, Oral, Daily, Korin Lopez MD, 220 mg at 09/01/21 0956    Data Review:  All labs (24hrs):   Recent Results (from the past 24 hour(s))   Comprehensive Metabolic Panel    Collection Time: 09/01/21  3:27 AM    Specimen: Blood   Result Value Ref Range    Glucose 148 (H) 65 - 99 mg/dL    BUN 36 (H) 6 - 20 mg/dL    Creatinine 1.09 (H) 0.57 - 1.00 mg/dL    Sodium 133 (L) 136 - 145 mmol/L    Potassium 4.3 3.5 - 5.2 mmol/L    Chloride 99 98 - 107 mmol/L    CO2 23.0 22.0 - 29.0 mmol/L    Calcium 8.2 (L) 8.6 - 10.5 mg/dL    Total Protein 6.3 6.0 - 8.5 g/dL    Albumin 3.20 (L) 3.50 - 5.20  g/dL    ALT (SGPT) 21 1 - 33 U/L    AST (SGOT) 30 1 - 32 U/L    Alkaline Phosphatase 66 39 - 117 U/L    Total Bilirubin 0.3 0.0 - 1.2 mg/dL    eGFR Non African Amer 52 (L) >60 mL/min/1.73    Globulin 3.1 gm/dL    A/G Ratio 1.0 g/dL    BUN/Creatinine Ratio 33.0 (H) 7.0 - 25.0    Anion Gap 11.0 5.0 - 15.0 mmol/L   C-reactive Protein    Collection Time: 09/01/21  3:27 AM    Specimen: Blood   Result Value Ref Range    C-Reactive Protein 10.03 (H) 0.00 - 0.50 mg/dL   D-dimer, Quantitative    Collection Time: 09/01/21  3:27 AM    Specimen: Blood   Result Value Ref Range    D-Dimer, Quantitative 0.53 0.00 - 0.59 mg/L (FEU)   CBC Auto Differential    Collection Time: 09/01/21  3:27 AM    Specimen: Blood   Result Value Ref Range    WBC 11.50 (H) 3.40 - 10.80 10*3/mm3    RBC 4.16 3.77 - 5.28 10*6/mm3    Hemoglobin 13.4 12.0 - 15.9 g/dL    Hematocrit 38.9 34.0 - 46.6 %    MCV 93.6 79.0 - 97.0 fL    MCH 32.3 26.6 - 33.0 pg    MCHC 34.5 31.5 - 35.7 g/dL    RDW 13.9 12.3 - 15.4 %    RDW-SD 44.2 37.0 - 54.0 fl    MPV 9.5 6.0 - 12.0 fL    Platelets 218 140 - 450 10*3/mm3    Neutrophil % 81.6 (H) 42.7 - 76.0 %    Lymphocyte % 8.2 (L) 19.6 - 45.3 %    Monocyte % 10.1 5.0 - 12.0 %    Eosinophil % 0.0 (L) 0.3 - 6.2 %    Basophil % 0.1 0.0 - 1.5 %    Neutrophils, Absolute 9.40 (H) 1.70 - 7.00 10*3/mm3    Lymphocytes, Absolute 0.90 0.70 - 3.10 10*3/mm3    Monocytes, Absolute 1.20 (H) 0.10 - 0.90 10*3/mm3    Eosinophils, Absolute 0.00 0.00 - 0.40 10*3/mm3    Basophils, Absolute 0.00 0.00 - 0.20 10*3/mm3    nRBC 0.0 0.0 - 0.2 /100 WBC   Bilirubin, Direct    Collection Time: 09/01/21  3:27 AM    Specimen: Blood   Result Value Ref Range    Bilirubin, Direct <0.2 0.0 - 0.3 mg/dL   Blood Gas, Arterial -    Collection Time: 09/01/21  8:55 AM    Specimen: Arterial Blood   Result Value Ref Range    Site Right Radial     Jose's Test Positive     pH, Arterial 7.421 7.350 - 7.450 pH units    pCO2, Arterial 31.9 (L) 35.0 - 48.0 mm Hg    pO2,  Arterial 50.4 (L) 83.0 - 108.0 mm Hg    HCO3, Arterial 20.7 (L) 21.0 - 28.0 mmol/L    Base Excess, Arterial -2.7 (L) 0.0 - 3.0 mmol/L    O2 Saturation, Arterial 86.5 (L) 94.0 - 98.0 %    CO2 Content 21.7 (L) 22 - 29 mmol/L    Barometric Pressure for Blood Gas      Modality NRB     FIO2 100 %    Hemodilution No    POC Glucose Once    Collection Time: 09/01/21 11:32 AM    Specimen: Blood   Result Value Ref Range    Glucose 150 (H) 70 - 105 mg/dL        Imaging:  XR Chest 1 View  Narrative: XR CHEST 1 VW-     Date of Exam: 9/1/2021 5:56 AM     Indication: Shortness of breath; R06.00-Dyspnea, unspecified;  U07.1-COVID-19; U07.1-COVID-19; J12.82-Pneumonia due to Coronavirus  disease 2019; R09.02-Hypoxemia.     Comparison Exams: August 31, 2021     Technique: Single AP chest radiograph     FINDINGS:  Patchy bilateral airspace opacities appear slightly worse on the left.  The heart and mediastinal contours appear stable. There is a small left  pleural effusion. The osseous structures appear intact.     Impression: 1.Patchy bilateral airspace opacities appear slightly worse on the left,  suggesting pneumonia.  2.Small left pleural effusion.     Electronically Signed By-Alex Adame MD On:9/1/2021 7:59 AM  This report was finalized on [id number] by  Alex Adame MD.       ASSESSMENT:  Acute hypoxic respiratory failure on oxygen supplement  COVID-19  Pneumonia   NEELIMA  Asthma  Hypertension  Hypothyroidism  GERD     PLAN:  Encourage to prone  OOB daily   Continue  anticoagulation  Continue oxygen support to keep sat > 90  Remdesivir, azithromycin, Dexa, Vit C, Zinc  Tips and advised to improve his nutrition  Bronchodilator  Inhaled corticosteroids  Electrolytes/ glycemic control  DVT and GI prophylaxis    Discussed with Dr Jerad Parks, TOMMY   9/1/2021  15:18 EDT     I personally have examined  and interviewed the patient. I have reviewed the history, data, problems, assessment and plan with our  NP.  Critical care time in direct medical management (   ) minutes  Electronically signed by Mathieu Mars MD, D,ABS, 09/01/21, 9:03 PM EDT.

## 2021-09-01 NOTE — PLAN OF CARE
Goal Outcome Evaluation:  Plan of Care Reviewed With: patient says she is feeling a little bit better with some noted good sleep.attempted to eat few bites of supper

## 2021-09-02 ENCOUNTER — APPOINTMENT (OUTPATIENT)
Dept: GENERAL RADIOLOGY | Facility: HOSPITAL | Age: 56
End: 2021-09-02

## 2021-09-02 LAB
ALBUMIN SERPL-MCNC: 3.1 G/DL (ref 3.5–5.2)
ALBUMIN/GLOB SERPL: 1.1 G/DL
ALP SERPL-CCNC: 63 U/L (ref 39–117)
ALT SERPL W P-5'-P-CCNC: 18 U/L (ref 1–33)
ANION GAP SERPL CALCULATED.3IONS-SCNC: 12 MMOL/L (ref 5–15)
AST SERPL-CCNC: 23 U/L (ref 1–32)
BASOPHILS # BLD AUTO: 0 10*3/MM3 (ref 0–0.2)
BASOPHILS NFR BLD AUTO: 0 % (ref 0–1.5)
BILIRUB CONJ SERPL-MCNC: <0.2 MG/DL (ref 0–0.3)
BILIRUB SERPL-MCNC: 0.3 MG/DL (ref 0–1.2)
BUN SERPL-MCNC: 38 MG/DL (ref 6–20)
BUN/CREAT SERPL: 35.5 (ref 7–25)
CALCIUM SPEC-SCNC: 8.3 MG/DL (ref 8.6–10.5)
CHLORIDE SERPL-SCNC: 97 MMOL/L (ref 98–107)
CO2 SERPL-SCNC: 24 MMOL/L (ref 22–29)
CREAT SERPL-MCNC: 1.07 MG/DL (ref 0.57–1)
CRP SERPL-MCNC: 4.51 MG/DL (ref 0–0.5)
D DIMER PPP FEU-MCNC: 0.48 MG/L (FEU) (ref 0–0.59)
DEPRECATED RDW RBC AUTO: 45.1 FL (ref 37–54)
EOSINOPHIL # BLD AUTO: 0 10*3/MM3 (ref 0–0.4)
EOSINOPHIL NFR BLD AUTO: 0 % (ref 0.3–6.2)
ERYTHROCYTE [DISTWIDTH] IN BLOOD BY AUTOMATED COUNT: 14.1 % (ref 12.3–15.4)
GFR SERPL CREATININE-BSD FRML MDRD: 53 ML/MIN/1.73
GLOBULIN UR ELPH-MCNC: 2.9 GM/DL
GLUCOSE BLDC GLUCOMTR-MCNC: 142 MG/DL (ref 70–105)
GLUCOSE BLDC GLUCOMTR-MCNC: 148 MG/DL (ref 70–105)
GLUCOSE BLDC GLUCOMTR-MCNC: 155 MG/DL (ref 70–105)
GLUCOSE BLDC GLUCOMTR-MCNC: 164 MG/DL (ref 70–105)
GLUCOSE SERPL-MCNC: 153 MG/DL (ref 65–99)
HCT VFR BLD AUTO: 37.2 % (ref 34–46.6)
HGB BLD-MCNC: 12.6 G/DL (ref 12–15.9)
LYMPHOCYTES # BLD AUTO: 0.8 10*3/MM3 (ref 0.7–3.1)
LYMPHOCYTES NFR BLD AUTO: 6.7 % (ref 19.6–45.3)
MCH RBC QN AUTO: 31.7 PG (ref 26.6–33)
MCHC RBC AUTO-ENTMCNC: 33.9 G/DL (ref 31.5–35.7)
MCV RBC AUTO: 93.6 FL (ref 79–97)
MONOCYTES # BLD AUTO: 0.7 10*3/MM3 (ref 0.1–0.9)
MONOCYTES NFR BLD AUTO: 6.3 % (ref 5–12)
NEUTROPHILS NFR BLD AUTO: 10.3 10*3/MM3 (ref 1.7–7)
NEUTROPHILS NFR BLD AUTO: 87 % (ref 42.7–76)
NRBC BLD AUTO-RTO: 0 /100 WBC (ref 0–0.2)
PLATELET # BLD AUTO: 272 10*3/MM3 (ref 140–450)
PMV BLD AUTO: 9.3 FL (ref 6–12)
POTASSIUM SERPL-SCNC: 4.1 MMOL/L (ref 3.5–5.2)
PROT SERPL-MCNC: 6 G/DL (ref 6–8.5)
RBC # BLD AUTO: 3.97 10*6/MM3 (ref 3.77–5.28)
SODIUM SERPL-SCNC: 133 MMOL/L (ref 136–145)
WBC # BLD AUTO: 11.8 10*3/MM3 (ref 3.4–10.8)

## 2021-09-02 PROCEDURE — 94799 UNLISTED PULMONARY SVC/PX: CPT

## 2021-09-02 PROCEDURE — 80053 COMPREHEN METABOLIC PANEL: CPT | Performed by: INTERNAL MEDICINE

## 2021-09-02 PROCEDURE — 25010000002 DEXAMETHASONE PER 1 MG: Performed by: NURSE PRACTITIONER

## 2021-09-02 PROCEDURE — 63710000001 INSULIN LISPRO (HUMAN) PER 5 UNITS: Performed by: NURSE PRACTITIONER

## 2021-09-02 PROCEDURE — 85025 COMPLETE CBC W/AUTO DIFF WBC: CPT | Performed by: INTERNAL MEDICINE

## 2021-09-02 PROCEDURE — 82248 BILIRUBIN DIRECT: CPT | Performed by: INTERNAL MEDICINE

## 2021-09-02 PROCEDURE — 25010000002 CEFTRIAXONE PER 250 MG: Performed by: INTERNAL MEDICINE

## 2021-09-02 PROCEDURE — 25010000002 MORPHINE PER 10 MG: Performed by: FAMILY MEDICINE

## 2021-09-02 PROCEDURE — 74018 RADEX ABDOMEN 1 VIEW: CPT

## 2021-09-02 PROCEDURE — 82962 GLUCOSE BLOOD TEST: CPT

## 2021-09-02 PROCEDURE — 85379 FIBRIN DEGRADATION QUANT: CPT | Performed by: INTERNAL MEDICINE

## 2021-09-02 PROCEDURE — 25010000002 FUROSEMIDE PER 20 MG: Performed by: NURSE PRACTITIONER

## 2021-09-02 PROCEDURE — 86140 C-REACTIVE PROTEIN: CPT | Performed by: INTERNAL MEDICINE

## 2021-09-02 RX ORDER — MORPHINE SULFATE 4 MG/ML
2 INJECTION, SOLUTION INTRAMUSCULAR; INTRAVENOUS
Status: DISPENSED | OUTPATIENT
Start: 2021-09-02 | End: 2021-09-09

## 2021-09-02 RX ORDER — POLYETHYLENE GLYCOL 3350 17 G/17G
17 POWDER, FOR SOLUTION ORAL DAILY
Status: DISCONTINUED | OUTPATIENT
Start: 2021-09-02 | End: 2021-09-10 | Stop reason: HOSPADM

## 2021-09-02 RX ORDER — DOCUSATE SODIUM 100 MG/1
100 CAPSULE, LIQUID FILLED ORAL 2 TIMES DAILY
Status: DISCONTINUED | OUTPATIENT
Start: 2021-09-02 | End: 2021-09-10 | Stop reason: HOSPADM

## 2021-09-02 RX ADMIN — ALBUTEROL SULFATE 2 PUFF: 90 AEROSOL, METERED RESPIRATORY (INHALATION) at 18:55

## 2021-09-02 RX ADMIN — FUROSEMIDE 20 MG: 10 INJECTION INTRAMUSCULAR; INTRAVENOUS at 22:03

## 2021-09-02 RX ADMIN — Medication 1000 UNITS: at 08:37

## 2021-09-02 RX ADMIN — TRAZODONE HYDROCHLORIDE 50 MG: 50 TABLET ORAL at 22:04

## 2021-09-02 RX ADMIN — BUDESONIDE AND FORMOTEROL FUMARATE DIHYDRATE 2 PUFF: 160; 4.5 AEROSOL RESPIRATORY (INHALATION) at 07:47

## 2021-09-02 RX ADMIN — CEFTRIAXONE SODIUM 1 G: 1 INJECTION, POWDER, FOR SOLUTION INTRAMUSCULAR; INTRAVENOUS at 17:15

## 2021-09-02 RX ADMIN — DOXYCYCLINE 100 MG: 100 TABLET, FILM COATED ORAL at 08:35

## 2021-09-02 RX ADMIN — OXYCODONE HYDROCHLORIDE AND ACETAMINOPHEN 500 MG: 500 TABLET ORAL at 08:35

## 2021-09-02 RX ADMIN — BUDESONIDE AND FORMOTEROL FUMARATE DIHYDRATE 2 PUFF: 160; 4.5 AEROSOL RESPIRATORY (INHALATION) at 18:55

## 2021-09-02 RX ADMIN — BENZONATATE 200 MG: 100 CAPSULE ORAL at 22:03

## 2021-09-02 RX ADMIN — FLUOXETINE 40 MG: 20 CAPSULE ORAL at 08:35

## 2021-09-02 RX ADMIN — REMDESIVIR 100 MG: 100 INJECTION, POWDER, LYOPHILIZED, FOR SOLUTION INTRAVENOUS at 17:14

## 2021-09-02 RX ADMIN — DEXAMETHASONE SODIUM PHOSPHATE 6 MG: 4 INJECTION, SOLUTION INTRAMUSCULAR; INTRAVENOUS at 08:35

## 2021-09-02 RX ADMIN — ALBUTEROL SULFATE 2 PUFF: 90 AEROSOL, METERED RESPIRATORY (INHALATION) at 04:12

## 2021-09-02 RX ADMIN — MONTELUKAST 10 MG: 10 TABLET, FILM COATED ORAL at 22:03

## 2021-09-02 RX ADMIN — ALBUTEROL SULFATE 2 PUFF: 90 AEROSOL, METERED RESPIRATORY (INHALATION) at 23:44

## 2021-09-02 RX ADMIN — ALBUTEROL SULFATE 2 PUFF: 90 AEROSOL, METERED RESPIRATORY (INHALATION) at 00:30

## 2021-09-02 RX ADMIN — Medication 3 ML: at 21:00

## 2021-09-02 RX ADMIN — DILTIAZEM HYDROCHLORIDE 180 MG: 180 CAPSULE, COATED, EXTENDED RELEASE ORAL at 08:35

## 2021-09-02 RX ADMIN — MORPHINE SULFATE 2 MG: 4 INJECTION INTRAVENOUS at 22:50

## 2021-09-02 RX ADMIN — ALBUTEROL SULFATE 2 PUFF: 90 AEROSOL, METERED RESPIRATORY (INHALATION) at 15:22

## 2021-09-02 RX ADMIN — INSULIN LISPRO 2 UNITS: 100 INJECTION, SOLUTION INTRAVENOUS; SUBCUTANEOUS at 17:14

## 2021-09-02 RX ADMIN — ALBUTEROL SULFATE 2 PUFF: 90 AEROSOL, METERED RESPIRATORY (INHALATION) at 11:06

## 2021-09-02 RX ADMIN — LORAZEPAM 1 MG: 1 TABLET ORAL at 22:03

## 2021-09-02 RX ADMIN — LEVOTHYROXINE SODIUM 112 MCG: 0.11 TABLET ORAL at 06:00

## 2021-09-02 RX ADMIN — Medication 220 MG: at 08:35

## 2021-09-02 RX ADMIN — DEXAMETHASONE SODIUM PHOSPHATE 6 MG: 4 INJECTION, SOLUTION INTRAMUSCULAR; INTRAVENOUS at 22:03

## 2021-09-02 RX ADMIN — DOXYCYCLINE 100 MG: 100 TABLET, FILM COATED ORAL at 22:04

## 2021-09-02 RX ADMIN — FUROSEMIDE 20 MG: 10 INJECTION INTRAMUSCULAR; INTRAVENOUS at 08:36

## 2021-09-02 RX ADMIN — ALBUTEROL SULFATE 2 PUFF: 90 AEROSOL, METERED RESPIRATORY (INHALATION) at 07:47

## 2021-09-02 RX ADMIN — PANTOPRAZOLE SODIUM 40 MG: 40 TABLET, DELAYED RELEASE ORAL at 06:01

## 2021-09-02 RX ADMIN — Medication 3 ML: at 08:37

## 2021-09-02 RX ADMIN — RIVAROXABAN 20 MG: 20 TABLET, FILM COATED ORAL at 17:16

## 2021-09-02 NOTE — PROGRESS NOTES
"PULMONARY CRITICAL CARE Progress  NOTE      PATIENT IDENTIFICATION:  Name: Danita Montiel  MRN: [id number]  :  1965     Age: 56 y.o.  Sex: female    DATE OF Note:  2021   Referring Physician: Korin Lopez MD                  Subjective:   Currently on PF 40/100,   no chest or abd pain, no bowel or bladder issues    Objective:  tMax 24 hrs: Temp (24hrs), Av.2 °F (36.2 °C), Min:96.7 °F (35.9 °C), Max:97.7 °F (36.5 °C)      Vitals Ranges:   Temp:  [96.7 °F (35.9 °C)-97.7 °F (36.5 °C)] 97.3 °F (36.3 °C)  Heart Rate:  [] 63  Resp:  [20-28] 24  BP: (114-151)/(51-84) 115/83    Intake and Output Last 3 Shifts:   I/O last 3 completed shifts:  In: 1080 [P.O.:1080]  Out: 1450 [Urine:1450]    Exam:  /83   Pulse 63   Temp 97.3 °F (36.3 °C) (Axillary)   Resp 24   Ht 167.6 cm (66\")   Wt 118 kg (261 lb 3.9 oz) Comment: bed needs zeroed   LMP  (LMP Unknown)   SpO2 96%   BMI 42.17 kg/m²     General Appearance: Alert    HEENT:  Normocephalic, without obvious abnormality, Conjunctiva/corneas clear,.  Normal external ear canals, Nares normal, no drainage     Neck:  Supple, symmetrical, trachea midline. No JVD.  Lungs /Chest wall:   Bilateral basal rhonchi, respirations unlabored symmetrical wall movement.     Heart:  Regular rate and rhythm, systolic murmur PMI left sternal border  Abdomen: Soft, non-tender, no masses, no organomegaly.    Extremities: Trace edema no clubbing or Cyanosis        Medications:    Current Facility-Administered Medications:   •  albuterol sulfate HFA (PROVENTIL HFA;VENTOLIN HFA;PROAIR HFA) inhaler 2 puff, 2 puff, Inhalation, Q4H - RT, Korin Lopez MD, 2 puff at 21 1106  •  ascorbic acid (VITAMIN C) tablet 500 mg, 500 mg, Oral, Daily, Korin Lopez MD, 500 mg at 21 0835  •  benzonatate (TESSALON) capsule 200 mg, 200 mg, Oral, TID PRN, Korin Lopez MD, 200 mg at 216  •  budesonide-formoterol (SYMBICORT) 160-4.5 MCG/ACT inhaler 2 puff, 2 puff, " Inhalation, BID - RT, Mathieu Mars MD, 2 puff at 09/02/21 0747  •  cefTRIAXone (ROCEPHIN) 1 g in sodium chloride 0.9 % 100 mL IVPB, 1 g, Intravenous, Q24H, Korin Lopez MD, Last Rate: 200 mL/hr at 09/01/21 1414, 1 g at 09/01/21 1414  •  cholecalciferol (VITAMIN D3) tablet 1,000 Units, 1,000 Units, Oral, Daily, Korin Lopez MD, 1,000 Units at 09/02/21 0837  •  dexamethasone (DECADRON) injection 6 mg, 6 mg, Intravenous, Q12H, Jessica Donis APRN, 6 mg at 09/02/21 0835  •  dextrose (D50W) 25 g/ 50mL Intravenous Solution 25 g, 25 g, Intravenous, Q15 Min PRN, Jessica Donis APRMIKE  •  dextrose (GLUTOSE) oral gel 15 g, 15 g, Oral, Q15 Min PRN, Jessica Donis APRN  •  dilTIAZem CD (CARDIZEM CD) 24 hr capsule 180 mg, 180 mg, Oral, Q24H, Ene Lewis, APRN, 180 mg at 09/02/21 0835  •  doxycycline (ADOXA) tablet 100 mg, 100 mg, Oral, Q12H, Korin Lopez MD, 100 mg at 09/02/21 0835  •  FLUoxetine (PROzac) capsule 40 mg, 40 mg, Oral, Daily, Korin Lopez MD, 40 mg at 09/02/21 0835  •  furosemide (LASIX) injection 20 mg, 20 mg, Intravenous, Q12H, Jessica Donis APRN, 20 mg at 09/02/21 0836  •  glucagon (human recombinant) (GLUCAGEN DIAGNOSTIC) 1 mg in sterile water (preservative free) 1 mL injection, 1 mg, Subcutaneous, Q15 Min PRN, Jessica Donis APRN  •  HYDROcod Polst-CPM Polst ER (TUSSIONEX PENNKINETIC) 10-8 MG/5ML ER suspension 5 mL, 5 mL, Oral, Q12H PRN, Mathieu Mars MD, 5 mL at 09/01/21 2030  •  insulin lispro (ADMELOG) injection 0-9 Units, 0-9 Units, Subcutaneous, TID AC **AND** insulin lispro (ADMELOG) injection 0-9 Units, 0-9 Units, Subcutaneous, PRN, Jessica Donis, APRN  •  levothyroxine (SYNTHROID, LEVOTHROID) tablet 112 mcg, 112 mcg, Oral, Q AM, Korin Lopez MD, 112 mcg at 09/02/21 0600  •  LORazepam (ATIVAN) tablet 1 mg, 1 mg, Oral, Q6H PRN, Korin Lopez MD, 1 mg at 09/01/21 2031  •  melatonin tablet 5 mg, 5 mg, Oral, Nightly PRN, Korin Lopez MD, 5 mg at 09/01/21 2031  •  metoprolol  tartrate (LOPRESSOR) injection 5 mg, 5 mg, Intravenous, Q4H PRN, Korin Lopez MD, 5 mg at 09/01/21 0956  •  montelukast (SINGULAIR) tablet 10 mg, 10 mg, Oral, Nightly, Korin Lopez MD, 10 mg at 09/01/21 2031  •  ondansetron (ZOFRAN) tablet 4 mg, 4 mg, Oral, Q6H PRN, Korin Lopez MD  •  pantoprazole (PROTONIX) EC tablet 40 mg, 40 mg, Oral, QAM, Korin Lopez MD, 40 mg at 09/02/21 0601  •  Pharmacy Consult - Remdesivir, , Does not apply, Continuous PRN, Korin Lopez MD  •  [COMPLETED] remdesivir 200 mg in sodium chloride 0.9 % 290 mL IVPB (powder vial), 200 mg, Intravenous, Q24H, 200 mg at 08/29/21 1919 **FOLLOWED BY** remdesivir 100 mg in sodium chloride 0.9 % 250 mL IVPB (powder vial), 100 mg, Intravenous, Q24H, Korin Lopez MD, 100 mg at 09/01/21 1804  •  rivaroxaban (XARELTO) tablet 20 mg, 20 mg, Oral, Daily With Dinner, Korin Lopez MD, 20 mg at 09/01/21 1804  •  sodium chloride 0.9 % flush 10 mL, 10 mL, Intravenous, PRN, Korin Lopez MD  •  [COMPLETED] Insert peripheral IV, , , Once **AND** sodium chloride 0.9 % flush 10 mL, 10 mL, Intravenous, PRN, Korin Lopez MD  •  sodium chloride 0.9 % flush 3 mL, 3 mL, Intravenous, Q12H, Korin Lopez MD, 3 mL at 09/02/21 0837  •  sodium chloride 0.9 % flush 3-10 mL, 3-10 mL, Intravenous, PRN, Korin Lopez MD  •  traZODone (DESYREL) tablet 50 mg, 50 mg, Oral, Nightly, Korin Lopez MD, 50 mg at 09/01/21 2031  •  zinc sulfate (ZINCATE) capsule 220 mg, 220 mg, Oral, Daily, Korin Lopez MD, 220 mg at 09/02/21 0835    Data Review:  All labs (24hrs):   Recent Results (from the past 24 hour(s))   POC Glucose Once    Collection Time: 09/01/21  5:06 PM    Specimen: Blood   Result Value Ref Range    Glucose 144 (H) 70 - 105 mg/dL   POC Glucose Once    Collection Time: 09/01/21  7:14 PM    Specimen: Blood   Result Value Ref Range    Glucose 157 (H) 70 - 105 mg/dL   Comprehensive Metabolic Panel    Collection Time: 09/02/21  2:27 AM    Specimen: Blood   Result Value Ref Range    Glucose  153 (H) 65 - 99 mg/dL    BUN 38 (H) 6 - 20 mg/dL    Creatinine 1.07 (H) 0.57 - 1.00 mg/dL    Sodium 133 (L) 136 - 145 mmol/L    Potassium 4.1 3.5 - 5.2 mmol/L    Chloride 97 (L) 98 - 107 mmol/L    CO2 24.0 22.0 - 29.0 mmol/L    Calcium 8.3 (L) 8.6 - 10.5 mg/dL    Total Protein 6.0 6.0 - 8.5 g/dL    Albumin 3.10 (L) 3.50 - 5.20 g/dL    ALT (SGPT) 18 1 - 33 U/L    AST (SGOT) 23 1 - 32 U/L    Alkaline Phosphatase 63 39 - 117 U/L    Total Bilirubin 0.3 0.0 - 1.2 mg/dL    eGFR Non African Amer 53 (L) >60 mL/min/1.73    Globulin 2.9 gm/dL    A/G Ratio 1.1 g/dL    BUN/Creatinine Ratio 35.5 (H) 7.0 - 25.0    Anion Gap 12.0 5.0 - 15.0 mmol/L   C-reactive Protein    Collection Time: 09/02/21  2:27 AM    Specimen: Blood   Result Value Ref Range    C-Reactive Protein 4.51 (H) 0.00 - 0.50 mg/dL   D-dimer, Quantitative    Collection Time: 09/02/21  2:27 AM    Specimen: Blood   Result Value Ref Range    D-Dimer, Quantitative 0.48 0.00 - 0.59 mg/L (FEU)   CBC Auto Differential    Collection Time: 09/02/21  2:27 AM    Specimen: Blood   Result Value Ref Range    WBC 11.80 (H) 3.40 - 10.80 10*3/mm3    RBC 3.97 3.77 - 5.28 10*6/mm3    Hemoglobin 12.6 12.0 - 15.9 g/dL    Hematocrit 37.2 34.0 - 46.6 %    MCV 93.6 79.0 - 97.0 fL    MCH 31.7 26.6 - 33.0 pg    MCHC 33.9 31.5 - 35.7 g/dL    RDW 14.1 12.3 - 15.4 %    RDW-SD 45.1 37.0 - 54.0 fl    MPV 9.3 6.0 - 12.0 fL    Platelets 272 140 - 450 10*3/mm3    Neutrophil % 87.0 (H) 42.7 - 76.0 %    Lymphocyte % 6.7 (L) 19.6 - 45.3 %    Monocyte % 6.3 5.0 - 12.0 %    Eosinophil % 0.0 (L) 0.3 - 6.2 %    Basophil % 0.0 0.0 - 1.5 %    Neutrophils, Absolute 10.30 (H) 1.70 - 7.00 10*3/mm3    Lymphocytes, Absolute 0.80 0.70 - 3.10 10*3/mm3    Monocytes, Absolute 0.70 0.10 - 0.90 10*3/mm3    Eosinophils, Absolute 0.00 0.00 - 0.40 10*3/mm3    Basophils, Absolute 0.00 0.00 - 0.20 10*3/mm3    nRBC 0.0 0.0 - 0.2 /100 WBC   Bilirubin, Direct    Collection Time: 09/02/21  2:27 AM    Specimen: Blood   Result  Value Ref Range    Bilirubin, Direct <0.2 0.0 - 0.3 mg/dL   POC Glucose Once    Collection Time: 09/02/21  7:27 AM    Specimen: Blood   Result Value Ref Range    Glucose 148 (H) 70 - 105 mg/dL   POC Glucose Once    Collection Time: 09/02/21 11:12 AM    Specimen: Blood   Result Value Ref Range    Glucose 142 (H) 70 - 105 mg/dL        Imaging:  XR Chest 1 View  Narrative: XR CHEST 1 VW-     Date of Exam: 9/1/2021 5:56 AM     Indication: Shortness of breath; R06.00-Dyspnea, unspecified;  U07.1-COVID-19; U07.1-COVID-19; J12.82-Pneumonia due to Coronavirus  disease 2019; R09.02-Hypoxemia.     Comparison Exams: August 31, 2021     Technique: Single AP chest radiograph     FINDINGS:  Patchy bilateral airspace opacities appear slightly worse on the left.  The heart and mediastinal contours appear stable. There is a small left  pleural effusion. The osseous structures appear intact.     Impression: 1.Patchy bilateral airspace opacities appear slightly worse on the left,  suggesting pneumonia.  2.Small left pleural effusion.     Electronically Signed By-Alex Adame MD On:9/1/2021 7:59 AM  This report was finalized on [id number] by  Alex Adame MD.       ASSESSMENT:  Acute hypoxic respiratory failure on oxygen supplement  COVID-19  Pneumonia   NEELIMA  Asthma  Hypertension  Hypothyroidism  GERD     PLAN:  Boost TID  OOB daily   Continue  anticoagulation  Continue oxygen support to keep sat > 90  Remdesivir, azithromycin, Dexa, Vit C, Zinc  Tips and advised to improve his nutrition  Bronchodilator  Inhaled corticosteroids  Electrolytes/ glycemic control  DVT and GI prophylaxis    Discussed with Dr Jerad Parks, TOMMY   9/2/2021  14:29 EDT     I personally have examined  and interviewed the patient. I have reviewed the history, data, problems, assessment and plan with our NP.  Critical care time in direct medical management (   ) minutes  Electronically signed by Mathieu Mars MD, D,ABS, 09/02/21, 5:45 PM  EDT.

## 2021-09-02 NOTE — PROGRESS NOTES
LOS: 4 days   Patient Care Team:  Jaya aHrper MD as PCP - General  Grief, Jaya RODRIGUEZ MD as PCP - Family Medicine    Subjective     Interval History: on PF 40/100    Patient Complaints: pt states that she is doing some better.  Still very SOB, weak    History taken from: patient    Review of Systems   Constitutional: Positive for activity change, appetite change and fatigue. Negative for fever.   Respiratory: Positive for cough, chest tightness and shortness of breath.    Cardiovascular: Negative.    Gastrointestinal: Negative.    Musculoskeletal: Positive for arthralgias.   Neurological: Positive for weakness.           Objective     Vital Signs  Temp:  [96.7 °F (35.9 °C)-97.7 °F (36.5 °C)] 97.3 °F (36.3 °C)  Heart Rate:  [] 80  Resp:  [20-26] 24  BP: (114-151)/(51-84) 115/83    Physical Exam:     General Appearance:    Alert, cooperative, mild resp distress   Head:    Normocephalic, without obvious abnormality, atraumatic   Eyes:            Lids and lashes normal, conjunctivae and sclerae normal, no   icterus, no pallor, corneas clear, PERRLA   Ears:    Ears appear intact with no abnormalities noted   Throat:   No oral lesions, no thrush, oral mucosa moist   Neck:   No adenopathy, supple, trachea midline, no thyromegaly, no   carotid bruit, no JVD   Lungs:     Course breath sounds    Heart:    Regular rhythm and normal rate, normal S1 and S2, no            murmur, no gallop, no rub, no click   Chest Wall:    No abnormalities observed   Abdomen:     Normal bowel sounds, no masses, no organomegaly, soft        non-tender, non-distended, no guarding, no rebound                tenderness   Extremities:   Moves all extremities well, no edema, no cyanosis, no             redness   Pulses:   Pulses palpable and equal bilaterally   Skin:   No bleeding, bruising or rash   Lymph nodes:   No palpable adenopathy   Neurologic:   Cranial nerves 2 - 12 grossly intact, sensation intact, DTR       present and equal  bilaterally        Results Review:    Lab Results (last 24 hours)     Procedure Component Value Units Date/Time    POC Glucose Once [[id number]]  (Abnormal) Collected: 09/02/21 1638    Specimen: Blood Updated: 09/02/21 1639     Glucose 155 mg/dL      Comment: Serial Number: [id number]:  [id number]       Blood Culture - Blood, Arm, Right [[id number]] Collected: 08/30/21 1549    Specimen: Blood from Arm, Right Updated: 09/02/21 1630     Blood Culture No growth at 3 days    Blood Culture - Blood, Hand, Left [[id number]] Collected: 08/30/21 1549    Specimen: Blood from Hand, Left Updated: 09/02/21 1630     Blood Culture No growth at 3 days    Blood Culture - Blood, Arm, Left [[id number]] Collected: 08/29/21 1227    Specimen: Blood from Arm, Left Updated: 09/02/21 1245     Blood Culture No growth at 4 days    Blood Culture - Blood, Arm, Left [[id number]] Collected: 08/29/21 1215    Specimen: Blood from Arm, Left Updated: 09/02/21 1230     Blood Culture No growth at 4 days    POC Glucose Once [[id number]]  (Abnormal) Collected: 09/02/21 1112    Specimen: Blood Updated: 09/02/21 1114     Glucose 142 mg/dL      Comment: Serial Number: [id number]:  [id number]       POC Glucose Once [[id number]]  (Abnormal) Collected: 09/02/21 0727    Specimen: Blood Updated: 09/02/21 0728     Glucose 148 mg/dL      Comment: Serial Number: [id number]:  [id number]       Comprehensive Metabolic Panel [[id number]]  (Abnormal) Collected: 09/02/21 0227    Specimen: Blood Updated: 09/02/21 0304     Glucose 153 mg/dL      BUN 38 mg/dL      Creatinine 1.07 mg/dL      Sodium 133 mmol/L      Potassium 4.1 mmol/L      Chloride 97 mmol/L      CO2 24.0 mmol/L      Calcium 8.3 mg/dL      Total Protein 6.0 g/dL      Albumin 3.10 g/dL      ALT (SGPT) 18 U/L      AST (SGOT) 23 U/L      Alkaline Phosphatase 63 U/L      Total Bilirubin 0.3 mg/dL      eGFR Non African Amer 53 mL/min/1.73      Globulin 2.9 gm/dL      A/G Ratio 1.1 g/dL       BUN/Creatinine Ratio 35.5     Anion Gap 12.0 mmol/L     Narrative:      GFR Normal >60  Chronic Kidney Disease <60  Kidney Failure <15      C-reactive Protein [[id number]]  (Abnormal) Collected: 09/02/21 0227    Specimen: Blood Updated: 09/02/21 0304     C-Reactive Protein 4.51 mg/dL     Bilirubin, Direct [[id number]]  (Normal) Collected: 09/02/21 0227    Specimen: Blood Updated: 09/02/21 0304     Bilirubin, Direct <0.2 mg/dL     D-dimer, Quantitative [[id number]]  (Normal) Collected: 09/02/21 0227    Specimen: Blood Updated: 09/02/21 0248     D-Dimer, Quantitative 0.48 mg/L (FEU)     Narrative:      Reference Range  --------------------------------------------------------------------     < 0.50   Negative Predictive Value  0.50-0.59   Indeterminate    >= 0.60   Probable VTE             A very low percentage of patients with DVT may yield D-Dimer results   below the cut-off of 0.50 mg/L FEU.  This is known to be more   prevalent in patients with distal DVT.             Results of this test should always be interpreted in conjunction with   the patient's medical history, clinical presentation and other   findings.  Clinical diagnosis should not be based on the result of   [place] D-Dimer alone.    CBC & Differential [[id number]]  (Abnormal) Collected: 09/02/21 0227    Specimen: Blood Updated: 09/02/21 0240    Narrative:      The following orders were created for panel order CBC & Differential.  Procedure                               Abnormality         Status                     ---------                               -----------         ------                     CBC Auto Differential[[id number]]        Abnormal            Final result                 Please view results for these tests on the individual orders.    CBC Auto Differential [[id number]]  (Abnormal) Collected: 09/02/21 0227    Specimen: Blood Updated: 09/02/21 0240     WBC 11.80 10*3/mm3      RBC 3.97 10*6/mm3      Hemoglobin 12.6 g/dL      Hematocrit 37.2  %      MCV 93.6 fL      MCH 31.7 pg      MCHC 33.9 g/dL      RDW 14.1 %      RDW-SD 45.1 fl      MPV 9.3 fL      Platelets 272 10*3/mm3      Neutrophil % 87.0 %      Lymphocyte % 6.7 %      Monocyte % 6.3 %      Eosinophil % 0.0 %      Basophil % 0.0 %      Neutrophils, Absolute 10.30 10*3/mm3      Lymphocytes, Absolute 0.80 10*3/mm3      Monocytes, Absolute 0.70 10*3/mm3      Eosinophils, Absolute 0.00 10*3/mm3      Basophils, Absolute 0.00 10*3/mm3      nRBC 0.0 /100 WBC            Imaging Results (Last 24 Hours)     ** No results found for the last 24 hours. **               I reviewed the patient's new clinical results.    Medication Review:   Scheduled Meds:albuterol sulfate HFA, 2 puff, Inhalation, Q4H - RT  ascorbic acid, 500 mg, Oral, Daily  budesonide-formoterol, 2 puff, Inhalation, BID - RT  cefTRIAXone, 1 g, Intravenous, Q24H  cholecalciferol, 1,000 Units, Oral, Daily  dexamethasone, 6 mg, Intravenous, Q12H  dilTIAZem CD, 180 mg, Oral, Q24H  doxycycline, 100 mg, Oral, Q12H  FLUoxetine, 40 mg, Oral, Daily  furosemide, 20 mg, Intravenous, Q12H  insulin lispro, 0-9 Units, Subcutaneous, TID AC  levothyroxine, 112 mcg, Oral, Q AM  montelukast, 10 mg, Oral, Nightly  pantoprazole, 40 mg, Oral, QAM  remdesivir, 100 mg, Intravenous, Q24H  rivaroxaban, 20 mg, Oral, Daily With Dinner  sodium chloride, 3 mL, Intravenous, Q12H  traZODone, 50 mg, Oral, Nightly  zinc sulfate, 220 mg, Oral, Daily      Continuous Infusions:Pharmacy Consult - Remdesivir,       PRN Meds:.benzonatate  •  dextrose  •  dextrose  •  glucagon (human recombinant)  •  HYDROcod Polst-CPM Polst ER  •  insulin lispro **AND** insulin lispro  •  LORazepam  •  melatonin  •  metoprolol tartrate  •  ondansetron  •  Pharmacy Consult - Remdesivir  •  sodium chloride  •  [COMPLETED] Insert peripheral IV **AND** sodium chloride  •  sodium chloride     Assessment/Plan       Pneumonia due to COVID-19 virus    Hypoxia  - improved slightly today.  Pulmonary  following.  Continue current tx - remdesivir, rocephin, doxycycline, bronchodilators,  O2, decadron, vit c, zinc  Afib with RVR - improved.  Cardiology following.    NEELIMA  Asthma  HTN  Hypothyroidism  GERD    Plan for disposition:LOLY Diaz MD  09/02/21  17:07 EDT

## 2021-09-02 NOTE — PLAN OF CARE
Goal Outcome Evaluation:  Plan of Care Reviewed With: patient        Progress: no change, continues on both PF and NRB. Coverted to N[facility] 0140 HR in 60's.

## 2021-09-02 NOTE — PLAN OF CARE
Goal Outcome Evaluation:              Outcome Summary: patient weaned down 40/100% per PF only SPO2 94% still having shortness of breath with movment in bed and or getting on the bed pan. patient has call light in reach

## 2021-09-02 NOTE — CASE MANAGEMENT/SOCIAL WORK
Continued Stay Note   [place]     Patient Name: Danita Montiel  MRN: [id number]  Today's Date: 9/2/2021    Admit Date: 8/29/2021    Discharge Plan     Row Name 09/02/21 1340       Plan    Plan Comments  DC barriers: PF 40/100 w/ NRB, CHXR worsening, Left sided pleural effusion, ABG showing pO2 50.4, iv abx        Expected Discharge Date and Time     Expected Discharge Date Expected Discharge Time    Sep 8, 2021         Phone communication or documentation only - no physical contact with patient or family.      Ammy Guillen RN

## 2021-09-03 LAB
ALBUMIN SERPL-MCNC: 3.1 G/DL (ref 3.5–5.2)
ALBUMIN/GLOB SERPL: 1.1 G/DL
ALP SERPL-CCNC: 62 U/L (ref 39–117)
ALT SERPL W P-5'-P-CCNC: 17 U/L (ref 1–33)
ANION GAP SERPL CALCULATED.3IONS-SCNC: 12 MMOL/L (ref 5–15)
AST SERPL-CCNC: 21 U/L (ref 1–32)
BACTERIA SPEC AEROBE CULT: NORMAL
BACTERIA SPEC AEROBE CULT: NORMAL
BASOPHILS # BLD AUTO: 0.1 10*3/MM3 (ref 0–0.2)
BASOPHILS NFR BLD AUTO: 0.7 % (ref 0–1.5)
BILIRUB CONJ SERPL-MCNC: <0.2 MG/DL (ref 0–0.3)
BILIRUB SERPL-MCNC: 0.3 MG/DL (ref 0–1.2)
BUN SERPL-MCNC: 32 MG/DL (ref 6–20)
BUN/CREAT SERPL: 33.3 (ref 7–25)
CA-I SERPL ISE-MCNC: 1.21 MMOL/L (ref 1.2–1.3)
CALCIUM SPEC-SCNC: 7.9 MG/DL (ref 8.6–10.5)
CHLORIDE SERPL-SCNC: 98 MMOL/L (ref 98–107)
CO2 SERPL-SCNC: 24 MMOL/L (ref 22–29)
CREAT SERPL-MCNC: 0.96 MG/DL (ref 0.57–1)
CRP SERPL-MCNC: 2.56 MG/DL (ref 0–0.5)
D DIMER PPP FEU-MCNC: 0.51 MG/L (FEU) (ref 0–0.59)
DEPRECATED RDW RBC AUTO: 45.1 FL (ref 37–54)
EOSINOPHIL # BLD AUTO: 0 10*3/MM3 (ref 0–0.4)
EOSINOPHIL NFR BLD AUTO: 0 % (ref 0.3–6.2)
ERYTHROCYTE [DISTWIDTH] IN BLOOD BY AUTOMATED COUNT: 14 % (ref 12.3–15.4)
GFR SERPL CREATININE-BSD FRML MDRD: 60 ML/MIN/1.73
GLOBULIN UR ELPH-MCNC: 2.7 GM/DL
GLUCOSE BLDC GLUCOMTR-MCNC: 126 MG/DL (ref 70–105)
GLUCOSE BLDC GLUCOMTR-MCNC: 140 MG/DL (ref 70–105)
GLUCOSE BLDC GLUCOMTR-MCNC: 151 MG/DL (ref 70–105)
GLUCOSE BLDC GLUCOMTR-MCNC: 153 MG/DL (ref 70–105)
GLUCOSE SERPL-MCNC: 145 MG/DL (ref 65–99)
HCT VFR BLD AUTO: 34.9 % (ref 34–46.6)
HGB BLD-MCNC: 11.8 G/DL (ref 12–15.9)
LYMPHOCYTES # BLD AUTO: 0.8 10*3/MM3 (ref 0.7–3.1)
LYMPHOCYTES NFR BLD AUTO: 6.1 % (ref 19.6–45.3)
MCH RBC QN AUTO: 30.7 PG (ref 26.6–33)
MCHC RBC AUTO-ENTMCNC: 33.8 G/DL (ref 31.5–35.7)
MCV RBC AUTO: 90.9 FL (ref 79–97)
MONOCYTES # BLD AUTO: 0.9 10*3/MM3 (ref 0.1–0.9)
MONOCYTES NFR BLD AUTO: 6.3 % (ref 5–12)
NEUTROPHILS NFR BLD AUTO: 12.1 10*3/MM3 (ref 1.7–7)
NEUTROPHILS NFR BLD AUTO: 86.9 % (ref 42.7–76)
NRBC BLD AUTO-RTO: 0 /100 WBC (ref 0–0.2)
PLATELET # BLD AUTO: 324 10*3/MM3 (ref 140–450)
PMV BLD AUTO: 8.8 FL (ref 6–12)
POTASSIUM SERPL-SCNC: 3.8 MMOL/L (ref 3.5–5.2)
PROT SERPL-MCNC: 5.8 G/DL (ref 6–8.5)
RBC # BLD AUTO: 3.84 10*6/MM3 (ref 3.77–5.28)
SODIUM SERPL-SCNC: 134 MMOL/L (ref 136–145)
WBC # BLD AUTO: 13.9 10*3/MM3 (ref 3.4–10.8)

## 2021-09-03 PROCEDURE — 94799 UNLISTED PULMONARY SVC/PX: CPT

## 2021-09-03 PROCEDURE — 86140 C-REACTIVE PROTEIN: CPT | Performed by: INTERNAL MEDICINE

## 2021-09-03 PROCEDURE — 25010000002 CEFTRIAXONE PER 250 MG: Performed by: INTERNAL MEDICINE

## 2021-09-03 PROCEDURE — 82962 GLUCOSE BLOOD TEST: CPT

## 2021-09-03 PROCEDURE — 85379 FIBRIN DEGRADATION QUANT: CPT | Performed by: INTERNAL MEDICINE

## 2021-09-03 PROCEDURE — 82330 ASSAY OF CALCIUM: CPT | Performed by: NURSE PRACTITIONER

## 2021-09-03 PROCEDURE — 25010000002 DEXAMETHASONE PER 1 MG: Performed by: NURSE PRACTITIONER

## 2021-09-03 PROCEDURE — 80053 COMPREHEN METABOLIC PANEL: CPT | Performed by: INTERNAL MEDICINE

## 2021-09-03 PROCEDURE — 82248 BILIRUBIN DIRECT: CPT | Performed by: INTERNAL MEDICINE

## 2021-09-03 PROCEDURE — 25010000002 FUROSEMIDE PER 20 MG: Performed by: NURSE PRACTITIONER

## 2021-09-03 PROCEDURE — 63710000001 INSULIN LISPRO (HUMAN) PER 5 UNITS: Performed by: NURSE PRACTITIONER

## 2021-09-03 PROCEDURE — 36415 COLL VENOUS BLD VENIPUNCTURE: CPT | Performed by: INTERNAL MEDICINE

## 2021-09-03 PROCEDURE — 85025 COMPLETE CBC W/AUTO DIFF WBC: CPT | Performed by: INTERNAL MEDICINE

## 2021-09-03 PROCEDURE — 25010000002 MORPHINE PER 10 MG: Performed by: FAMILY MEDICINE

## 2021-09-03 RX ORDER — LACTULOSE 10 G/15ML
20 SOLUTION ORAL DAILY
Status: COMPLETED | OUTPATIENT
Start: 2021-09-03 | End: 2021-09-03

## 2021-09-03 RX ORDER — DILTIAZEM HYDROCHLORIDE 120 MG/1
120 CAPSULE, COATED, EXTENDED RELEASE ORAL
Status: DISCONTINUED | OUTPATIENT
Start: 2021-09-04 | End: 2021-09-10 | Stop reason: HOSPADM

## 2021-09-03 RX ADMIN — ALBUTEROL SULFATE 2 PUFF: 90 AEROSOL, METERED RESPIRATORY (INHALATION) at 03:46

## 2021-09-03 RX ADMIN — DOCUSATE SODIUM 100 MG: 100 CAPSULE, LIQUID FILLED ORAL at 06:02

## 2021-09-03 RX ADMIN — LORAZEPAM 1 MG: 1 TABLET ORAL at 23:10

## 2021-09-03 RX ADMIN — INSULIN LISPRO 2 UNITS: 100 INJECTION, SOLUTION INTRAVENOUS; SUBCUTANEOUS at 11:38

## 2021-09-03 RX ADMIN — POLYETHYLENE GLYCOL 3350 17 G: 17 POWDER, FOR SOLUTION ORAL at 06:02

## 2021-09-03 RX ADMIN — LACTULOSE 20 G: 20 SOLUTION ORAL at 14:19

## 2021-09-03 RX ADMIN — DILTIAZEM HYDROCHLORIDE 180 MG: 180 CAPSULE, COATED, EXTENDED RELEASE ORAL at 11:37

## 2021-09-03 RX ADMIN — BUDESONIDE AND FORMOTEROL FUMARATE DIHYDRATE 2 PUFF: 160; 4.5 AEROSOL RESPIRATORY (INHALATION) at 07:22

## 2021-09-03 RX ADMIN — PANTOPRAZOLE SODIUM 40 MG: 40 TABLET, DELAYED RELEASE ORAL at 06:04

## 2021-09-03 RX ADMIN — FUROSEMIDE 20 MG: 10 INJECTION INTRAMUSCULAR; INTRAVENOUS at 11:38

## 2021-09-03 RX ADMIN — OXYCODONE HYDROCHLORIDE AND ACETAMINOPHEN 500 MG: 500 TABLET ORAL at 11:37

## 2021-09-03 RX ADMIN — DEXAMETHASONE SODIUM PHOSPHATE 6 MG: 4 INJECTION, SOLUTION INTRAMUSCULAR; INTRAVENOUS at 11:37

## 2021-09-03 RX ADMIN — ALBUTEROL SULFATE 2 PUFF: 90 AEROSOL, METERED RESPIRATORY (INHALATION) at 12:01

## 2021-09-03 RX ADMIN — ALBUTEROL SULFATE 2 PUFF: 90 AEROSOL, METERED RESPIRATORY (INHALATION) at 20:19

## 2021-09-03 RX ADMIN — ALBUTEROL SULFATE 2 PUFF: 90 AEROSOL, METERED RESPIRATORY (INHALATION) at 07:22

## 2021-09-03 RX ADMIN — LEVOTHYROXINE SODIUM 112 MCG: 0.11 TABLET ORAL at 05:41

## 2021-09-03 RX ADMIN — FLUOXETINE 40 MG: 20 CAPSULE ORAL at 11:38

## 2021-09-03 RX ADMIN — DOCUSATE SODIUM 100 MG: 100 CAPSULE, LIQUID FILLED ORAL at 11:38

## 2021-09-03 RX ADMIN — DOCUSATE SODIUM 100 MG: 100 CAPSULE, LIQUID FILLED ORAL at 21:35

## 2021-09-03 RX ADMIN — Medication 220 MG: at 11:39

## 2021-09-03 RX ADMIN — MORPHINE SULFATE 2 MG: 4 INJECTION INTRAVENOUS at 01:04

## 2021-09-03 RX ADMIN — MONTELUKAST 10 MG: 10 TABLET, FILM COATED ORAL at 21:35

## 2021-09-03 RX ADMIN — BUDESONIDE AND FORMOTEROL FUMARATE DIHYDRATE 2 PUFF: 160; 4.5 AEROSOL RESPIRATORY (INHALATION) at 20:19

## 2021-09-03 RX ADMIN — CEFTRIAXONE SODIUM 1 G: 1 INJECTION, POWDER, FOR SOLUTION INTRAMUSCULAR; INTRAVENOUS at 14:19

## 2021-09-03 RX ADMIN — ALBUTEROL SULFATE 2 PUFF: 90 AEROSOL, METERED RESPIRATORY (INHALATION) at 16:08

## 2021-09-03 RX ADMIN — Medication 3 ML: at 11:39

## 2021-09-03 RX ADMIN — DEXAMETHASONE SODIUM PHOSPHATE 6 MG: 4 INJECTION, SOLUTION INTRAMUSCULAR; INTRAVENOUS at 21:35

## 2021-09-03 RX ADMIN — DOXYCYCLINE 100 MG: 100 TABLET, FILM COATED ORAL at 11:38

## 2021-09-03 RX ADMIN — DOXYCYCLINE 100 MG: 100 TABLET, FILM COATED ORAL at 21:35

## 2021-09-03 RX ADMIN — Medication 1000 UNITS: at 11:37

## 2021-09-03 RX ADMIN — HYDROCODONE POLISTIREX AND CHLORPHENIRAMINE POLISTIREX 5 ML: 10; 8 SUSPENSION, EXTENDED RELEASE ORAL at 01:04

## 2021-09-03 RX ADMIN — RIVAROXABAN 20 MG: 20 TABLET, FILM COATED ORAL at 18:38

## 2021-09-03 RX ADMIN — FUROSEMIDE 20 MG: 10 INJECTION INTRAMUSCULAR; INTRAVENOUS at 21:35

## 2021-09-03 RX ADMIN — TRAZODONE HYDROCHLORIDE 50 MG: 50 TABLET ORAL at 21:35

## 2021-09-03 RX ADMIN — Medication 3 ML: at 23:14

## 2021-09-03 NOTE — NURSING NOTE
Call placed to answering service about patient suma episode at 0500 this am. No pauses this am. Awaiting call back

## 2021-09-03 NOTE — PLAN OF CARE
Goal Outcome Evaluation:  Plan of Care Reviewed With: patient        Progress: no change, increased O2 demand from dayshift wean off NRB. Pt had c/o right quadrant pain and stat KUB and IV pain meds ordered. Has received 2 doses of Morphine and rested well over the night. Have noted several periods of sinus bradycardia on the monitor. To continue to monitor

## 2021-09-03 NOTE — PROGRESS NOTES
LOS: 5 days   Patient Care Team:  Jaya Harper MD as PCP - General  GriefJaya MD as PCP - Family Medicine    Subjective     Interval History:    Patient Complaints:  SOA persists. RUQ pain persists.     History taken from: patient    Review of Systems   Constitutional: Positive for activity change, appetite change and fatigue. Negative for fever.   HENT: Negative for trouble swallowing.    Eyes: Negative for visual disturbance.   Respiratory: Positive for cough, chest tightness, shortness of breath and wheezing.    Cardiovascular: Negative for chest pain, palpitations and leg swelling.   Gastrointestinal: Positive for abdominal pain (RUQ) and constipation. Negative for diarrhea, nausea and vomiting.   Genitourinary: Negative for difficulty urinating.   Musculoskeletal: Positive for back pain and gait problem.   Neurological: Positive for weakness.   Psychiatric/Behavioral: Negative for confusion.           Objective     Vital Signs  Temp:  [96.6 °F (35.9 °C)-98 °F (36.7 °C)] 98 °F (36.7 °C)  Heart Rate:  [58-83] 72  Resp:  [18-24] 18  BP: ()/(52-87) 99/52    Physical Exam:     General Appearance:    Alert, cooperative, respirations are labored    Head:    Normocephalic, without obvious abnormality, atraumatic   Eyes:            Lids and lashes normal, conjunctivae and sclerae normal, no   icterus, no pallor, corneas clear, PERRLA   Ears:    Ears appear intact with no abnormalities noted   Throat:   No oral lesions, no thrush, oral mucosa moist   Neck:   No adenopathy, supple, trachea midline, no thyromegaly, no   carotid bruit, no JVD   Lungs:     Rhonchi throughout     Heart:    Regular rhythm and normal rate, normal S1 and S2, no            murmur, no gallop, no rub, no click   Chest Wall:    No abnormalities observed   Abdomen:     Normal bowel sounds, no masses, no organomegaly, soft        RUQ TTP no rebound -non-distended, no guarding,   Extremities:   Moves all extremities well, no  edema, no cyanosis, no             Redness   Pulses:   Pulses palpable and equal bilaterally   Skin:   No bleeding, bruising or rash   Lymph nodes:   No palpable adenopathy   Neurologic:   Cranial nerves 2 - 12 grossly intact, sensation intact, DTR       present and equal bilaterally        Results Review:    Lab Results (last 24 hours)     Procedure Component Value Units Date/Time    POC Glucose Once [[id number]]  (Abnormal) Collected: 09/03/21 1102    Specimen: Blood Updated: 09/03/21 1103     Glucose 151 mg/dL      Comment: Serial Number: [id number]:  [id number]       Calcium, Ionized [[id number]]  (Normal) Collected: 09/03/21 0839    Specimen: Blood Updated: 09/03/21 0852     Ionized Calcium 1.21 mmol/L     POC Glucose Once [[id number]]  (Abnormal) Collected: 09/03/21 0714    Specimen: Blood Updated: 09/03/21 0716     Glucose 140 mg/dL      Comment: Serial Number: [id number]:  [id number]       Comprehensive Metabolic Panel [[id number]]  (Abnormal) Collected: 09/03/21 0241    Specimen: Blood Updated: 09/03/21 0331     Glucose 145 mg/dL      BUN 32 mg/dL      Creatinine 0.96 mg/dL      Sodium 134 mmol/L      Potassium 3.8 mmol/L      Chloride 98 mmol/L      CO2 24.0 mmol/L      Calcium 7.9 mg/dL      Total Protein 5.8 g/dL      Albumin 3.10 g/dL      ALT (SGPT) 17 U/L      AST (SGOT) 21 U/L      Alkaline Phosphatase 62 U/L      Total Bilirubin 0.3 mg/dL      eGFR Non African Amer 60 mL/min/1.73      Globulin 2.7 gm/dL      A/G Ratio 1.1 g/dL      BUN/Creatinine Ratio 33.3     Anion Gap 12.0 mmol/L     Narrative:      GFR Normal >60  Chronic Kidney Disease <60  Kidney Failure <15      Bilirubin, Direct [[id number]]  (Normal) Collected: 09/03/21 0241    Specimen: Blood Updated: 09/03/21 0331     Bilirubin, Direct <0.2 mg/dL     C-reactive Protein [[id number]]  (Abnormal) Collected: 09/03/21 0241    Specimen: Blood Updated: 09/03/21 0331     C-Reactive Protein 2.56 mg/dL     D-dimer, Quantitative [[id number]]   (Normal) Collected: 09/03/21 0241    Specimen: Blood Updated: 09/03/21 0325     D-Dimer, Quantitative 0.51 mg/L (FEU)     Narrative:      Reference Range  --------------------------------------------------------------------     < 0.50   Negative Predictive Value  0.50-0.59   Indeterminate    >= 0.60   Probable VTE             A very low percentage of patients with DVT may yield D-Dimer results   below the cut-off of 0.50 mg/L FEU.  This is known to be more   prevalent in patients with distal DVT.             Results of this test should always be interpreted in conjunction with   the patient's medical history, clinical presentation and other   findings.  Clinical diagnosis should not be based on the result of   IN[place]OV[place] D-Dimer alone.    CBC & Differential [[id number]]  (Abnormal) Collected: 09/03/21 0241    Specimen: Blood Updated: 09/03/21 0303    Narrative:      The following orders were created for panel order CBC & Differential.  Procedure                               Abnormality         Status                     ---------                               -----------         ------                     CBC Auto Differential[[id number]]        Abnormal            Final result                 Please view results for these tests on the individual orders.    CBC Auto Differential [[id number]]  (Abnormal) Collected: 09/03/21 0241    Specimen: Blood Updated: 09/03/21 0303     WBC 13.90 10*3/mm3      RBC 3.84 10*6/mm3      Hemoglobin 11.8 g/dL      Hematocrit 34.9 %      MCV 90.9 fL      MCH 30.7 pg      MCHC 33.8 g/dL      RDW 14.0 %      RDW-SD 45.1 fl      MPV 8.8 fL      Platelets 324 10*3/mm3      Neutrophil % 86.9 %      Lymphocyte % 6.1 %      Monocyte % 6.3 %      Eosinophil % 0.0 %      Basophil % 0.7 %      Neutrophils, Absolute 12.10 10*3/mm3      Lymphocytes, Absolute 0.80 10*3/mm3      Monocytes, Absolute 0.90 10*3/mm3      Eosinophils, Absolute 0.00 10*3/mm3      Basophils, Absolute 0.10 10*3/mm3      nRBC  0.0 /100 WBC     POC Glucose Once [[id number]]  (Abnormal) Collected: 09/02/21 2033    Specimen: Blood Updated: 09/02/21 2034     Glucose 164 mg/dL      Comment: Serial Number: [id number]:  [id number]       POC Glucose Once [[id number]]  (Abnormal) Collected: 09/02/21 1638    Specimen: Blood Updated: 09/02/21 1639     Glucose 155 mg/dL      Comment: Serial Number: [id number]:  [id number]       Blood Culture - Blood, Arm, Right [[id number]] Collected: 08/30/21 1549    Specimen: Blood from Arm, Right Updated: 09/02/21 1630     Blood Culture No growth at 3 days    Blood Culture - Blood, Hand, Left [[id number]] Collected: 08/30/21 1549    Specimen: Blood from Hand, Left Updated: 09/02/21 1630     Blood Culture No growth at 3 days           Imaging Results (Last 24 Hours)     Procedure Component Value Units Date/Time    XR Abdomen KUB [[id number]] Collected: 09/02/21 2327     Updated: 09/02/21 2331    Narrative:      DATE OF EXAM:  9/2/2021 11:04 PM     PROCEDURE:  XR ABDOMEN KUB-     INDICATIONS:  RUQ pain; R06.00-Dyspnea, unspecified; U07.1-COVID-19; U07.1-COVID-19;  J12.82-Pneumonia due to Coronavirus disease 2019; R09.02-Hypoxemia     COMPARISON:  Portable chest 09/01/2021     TECHNIQUE:   Single radiographic view of the abdomen was obtained.     FINDINGS:  There are right upper quadrant surgical clips. There is scattered gas  and stool in the colon. There is no gaseous distention of small bowel.  There is no gross soft tissue mass or abnormal calcification. There are  bilateral lower lung opacities greater on left than right, grossly  similar to the prior chest x-ray. There are right upper quadrant  surgical clips.       Impression:      1. Unremarkable bowel gas pattern.      Electronically Signed By-Breanna Sanchez MD On:9/2/2021 11:29 PM  This report was finalized on [id number] by  Breanna Sanchez MD.               I reviewed the patient's new clinical results.    Medication Review:   Scheduled  Meds:albuterol sulfate HFA, 2 puff, Inhalation, Q4H - RT  ascorbic acid, 500 mg, Oral, Daily  budesonide-formoterol, 2 puff, Inhalation, BID - RT  cefTRIAXone, 1 g, Intravenous, Q24H  cholecalciferol, 1,000 Units, Oral, Daily  dexamethasone, 6 mg, Intravenous, Q12H  dilTIAZem CD, 180 mg, Oral, Q24H  docusate sodium, 100 mg, Oral, BID  doxycycline, 100 mg, Oral, Q12H  FLUoxetine, 40 mg, Oral, Daily  furosemide, 20 mg, Intravenous, Q12H  insulin lispro, 0-9 Units, Subcutaneous, TID AC  levothyroxine, 112 mcg, Oral, Q AM  montelukast, 10 mg, Oral, Nightly  pantoprazole, 40 mg, Oral, QAM  polyethylene glycol, 17 g, Oral, Daily  rivaroxaban, 20 mg, Oral, Daily With Dinner  sodium chloride, 3 mL, Intravenous, Q12H  traZODone, 50 mg, Oral, Nightly  zinc sulfate, 220 mg, Oral, Daily      Continuous Infusions:Pharmacy Consult - Remdesivir,       PRN Meds:.benzonatate  •  dextrose  •  dextrose  •  glucagon (human recombinant)  •  HYDROcod Polst-CPM Polst ER  •  insulin lispro **AND** insulin lispro  •  LORazepam  •  melatonin  •  metoprolol tartrate  •  Morphine  •  ondansetron  •  Pharmacy Consult - Remdesivir  •  sodium chloride  •  [COMPLETED] Insert peripheral IV **AND** sodium chloride  •  sodium chloride     Assessment/Plan       Pneumonia due to COVID-19 virus    Hypoxia      Remains on PF 40/100 sats 93%- improved.     Pulmonary following.  Continue current tx - finished remdesivir,continue - rocephin, doxycycline, bronchodilators,  O2, decadron, vit c, zinc    Afib with RVR - improved.  Cardiology following.  Pt is in SR at the time of my exam . Continue Cardizem     NEELIMA  Asthma  HTN  Hypothyroidism  GERD   RUQ pain - KUB reviewed-Unremarkable gas pattern- pt is on stool softener and miralax with no BM she feels constipated. Treat constipation.     Plan for disposition: TBJACKI Donis, TOMMY  09/03/21  12:05 EDT

## 2021-09-03 NOTE — CASE MANAGEMENT/SOCIAL WORK
Continued Stay Note   [place]     Patient Name: Danita Montiel  MRN: [id number]  Today's Date: 9/3/2021    Admit Date: 8/29/2021    Discharge Plan     Row Name 09/03/21 1523       Plan    Plan Comments  DC barriers: PF 40/100 with NRB, bradycardia r/t vagel response from coughing        Expected Discharge Date and Time     Expected Discharge Date Expected Discharge Time    Sep 8, 2021         Phone communication or documentation only - no physical contact with patient or family.      Ammy Guillen RN

## 2021-09-03 NOTE — PROGRESS NOTES
"PULMONARY CRITICAL CARE Progress  NOTE      PATIENT IDENTIFICATION:  Name: Danita Montiel  MRN: [id number]  :  1965     Age: 56 y.o.  Sex: female    DATE OF Note:  9/3/2021   Referring Physician: Korin Lopez MD                  Subjective:   Currently on PF 40/100, no new issues,  no chest or abd pain, no bowel or bladder issues    Objective:  tMax 24 hrs: Temp (24hrs), Av.4 °F (36.3 °C), Min:96.6 °F (35.9 °C), Max:98 °F (36.7 °C)      Vitals Ranges:   Temp:  [96.6 °F (35.9 °C)-98 °F (36.7 °C)] 98 °F (36.7 °C)  Heart Rate:  [58-83] 70  Resp:  [18-24] 18  BP: ()/(52-87) 99/52    Intake and Output Last 3 Shifts:   I/O last 3 completed shifts:  In: 600 [P.O.:600]  Out: 2600 [Urine:2600]    Exam:  BP 99/52   Pulse 70   Temp 98 °F (36.7 °C) (Axillary)   Resp 18   Ht 167.6 cm (66\")   Wt 118 kg (261 lb 3.9 oz) Comment: bed needs zeroed   LMP  (LMP Unknown)   SpO2 95%   BMI 42.17 kg/m²     General Appearance: Alert    HEENT:  Normocephalic, without obvious abnormality, Conjunctiva/corneas clear,.  Normal external ear canals, Nares normal, no drainage     Neck:  Supple, symmetrical, trachea midline. No JVD.  Lungs /Chest wall:   Bilateral basal rhonchi, respirations unlabored symmetrical wall movement.     Heart:  Regular rate and rhythm, systolic murmur PMI left sternal border  Abdomen: Soft, non-tender, no masses, no organomegaly.    Extremities: Trace edema no clubbing or Cyanosis        Medications:    Current Facility-Administered Medications:   •  albuterol sulfate HFA (PROVENTIL HFA;VENTOLIN HFA;PROAIR HFA) inhaler 2 puff, 2 puff, Inhalation, Q4H - RT, Korin Lopez MD, 2 puff at 21 0722  •  ascorbic acid (VITAMIN C) tablet 500 mg, 500 mg, Oral, Daily, Lowney, Korin, MD, 500 mg at 21 0835  •  benzonatate (TESSALON) capsule 200 mg, 200 mg, Oral, TID PRN, Korin Lopez MD, 200 mg at 21 2203  •  budesonide-formoterol (SYMBICORT) 160-4.5 MCG/ACT inhaler 2 puff, 2 puff, " Inhalation, BID - RT, Mathieu Mars MD, 2 puff at 09/03/21 0722  •  cefTRIAXone (ROCEPHIN) 1 g in sodium chloride 0.9 % 100 mL IVPB, 1 g, Intravenous, Q24H, Korin Lopez MD, Last Rate: 200 mL/hr at 09/02/21 1715, 1 g at 09/02/21 1715  •  cholecalciferol (VITAMIN D3) tablet 1,000 Units, 1,000 Units, Oral, Daily, Korin Lopez MD, 1,000 Units at 09/02/21 0837  •  dexamethasone (DECADRON) injection 6 mg, 6 mg, Intravenous, Q12H, Jessica Donis APRN, 6 mg at 09/02/21 2203  •  dextrose (D50W) 25 g/ 50mL Intravenous Solution 25 g, 25 g, Intravenous, Q15 Min PRN, Jessica Donis APRN  •  dextrose (GLUTOSE) oral gel 15 g, 15 g, Oral, Q15 Min PRN, Jessica Donis APRN  •  dilTIAZem CD (CARDIZEM CD) 24 hr capsule 180 mg, 180 mg, Oral, Q24H, Ene Lewis APRN, 180 mg at 09/02/21 0835  •  docusate sodium (COLACE) capsule 100 mg, 100 mg, Oral, BID, Shana Diaz MD, 100 mg at 09/03/21 0602  •  doxycycline (ADOXA) tablet 100 mg, 100 mg, Oral, Q12H, Korin Lopez MD, 100 mg at 09/02/21 2204  •  FLUoxetine (PROzac) capsule 40 mg, 40 mg, Oral, Daily, Korin Lopez MD, 40 mg at 09/02/21 0835  •  furosemide (LASIX) injection 20 mg, 20 mg, Intravenous, Q12H, Jessica Donis APRN, 20 mg at 09/02/21 2203  •  glucagon (human recombinant) (GLUCAGEN DIAGNOSTIC) 1 mg in sterile water (preservative free) 1 mL injection, 1 mg, Subcutaneous, Q15 Min PRN, Jessica Donis APRN  •  HYDROcod Polst-CPM Polst ER (TUSSIONEX PENNKINETIC) 10-8 MG/5ML ER suspension 5 mL, 5 mL, Oral, Q12H PRN, Mathieu Mars MD, 5 mL at 09/03/21 0104  •  insulin lispro (ADMELOG) injection 0-9 Units, 0-9 Units, Subcutaneous, TID AC, 2 Units at 09/02/21 1714 **AND** insulin lispro (ADMELOG) injection 0-9 Units, 0-9 Units, Subcutaneous, PRN, Jessica Donis, APRN  •  levothyroxine (SYNTHROID, LEVOTHROID) tablet 112 mcg, 112 mcg, Oral, Q AM, Korin Lopez MD, 112 mcg at 09/03/21 0541  •  LORazepam (ATIVAN) tablet 1 mg, 1 mg, Oral, Q6H PRN, Korin Lopez MD,  1 mg at 09/02/21 2203  •  melatonin tablet 5 mg, 5 mg, Oral, Nightly PRN, Korin Lopez MD, 5 mg at 09/01/21 2031  •  metoprolol tartrate (LOPRESSOR) injection 5 mg, 5 mg, Intravenous, Q4H PRN, Korin Lopez MD, 5 mg at 09/01/21 0956  •  montelukast (SINGULAIR) tablet 10 mg, 10 mg, Oral, Nightly, Korin Lopez MD, 10 mg at 09/02/21 2203  •  Morphine sulfate (PF) injection 2 mg, 2 mg, Intravenous, Q2H PRN, Shana Diaz MD, 2 mg at 09/03/21 0104  •  ondansetron (ZOFRAN) tablet 4 mg, 4 mg, Oral, Q6H PRN, Korin Lopez MD  •  pantoprazole (PROTONIX) EC tablet 40 mg, 40 mg, Oral, QAM, Korin Lopez MD, 40 mg at 09/03/21 0604  •  Pharmacy Consult - Remdesivir, , Does not apply, Continuous PRN, Korin Lopez MD  •  polyethylene glycol (MIRALAX) packet 17 g, 17 g, Oral, Daily, Shana Diaz MD, 17 g at 09/03/21 0602  •  rivaroxaban (XARELTO) tablet 20 mg, 20 mg, Oral, Daily With Dinner, Korin Lopez MD, 20 mg at 09/02/21 1716  •  sodium chloride 0.9 % flush 10 mL, 10 mL, Intravenous, PRN, Korin Lopez MD  •  [COMPLETED] Insert peripheral IV, , , Once **AND** sodium chloride 0.9 % flush 10 mL, 10 mL, Intravenous, PRN, Korin Lopez MD  •  sodium chloride 0.9 % flush 3 mL, 3 mL, Intravenous, Q12H, Korin Lopez MD, 3 mL at 09/02/21 2100  •  sodium chloride 0.9 % flush 3-10 mL, 3-10 mL, Intravenous, PRN, Korin Lopez MD  •  traZODone (DESYREL) tablet 50 mg, 50 mg, Oral, Nightly, Korin Lopez MD, 50 mg at 09/02/21 2204  •  zinc sulfate (ZINCATE) capsule 220 mg, 220 mg, Oral, Daily, Korin Lopez MD, 220 mg at 09/02/21 0835    Data Review:  All labs (24hrs):   Recent Results (from the past 24 hour(s))   POC Glucose Once    Collection Time: 09/02/21  4:38 PM    Specimen: Blood   Result Value Ref Range    Glucose 155 (H) 70 - 105 mg/dL   POC Glucose Once    Collection Time: 09/02/21  8:33 PM    Specimen: Blood   Result Value Ref Range    Glucose 164 (H) 70 - 105 mg/dL   Comprehensive Metabolic Panel    Collection Time: 09/03/21   2:41 AM    Specimen: Blood   Result Value Ref Range    Glucose 145 (H) 65 - 99 mg/dL    BUN 32 (H) 6 - 20 mg/dL    Creatinine 0.96 0.57 - 1.00 mg/dL    Sodium 134 (L) 136 - 145 mmol/L    Potassium 3.8 3.5 - 5.2 mmol/L    Chloride 98 98 - 107 mmol/L    CO2 24.0 22.0 - 29.0 mmol/L    Calcium 7.9 (L) 8.6 - 10.5 mg/dL    Total Protein 5.8 (L) 6.0 - 8.5 g/dL    Albumin 3.10 (L) 3.50 - 5.20 g/dL    ALT (SGPT) 17 1 - 33 U/L    AST (SGOT) 21 1 - 32 U/L    Alkaline Phosphatase 62 39 - 117 U/L    Total Bilirubin 0.3 0.0 - 1.2 mg/dL    eGFR Non African Amer 60 (L) >60 mL/min/1.73    Globulin 2.7 gm/dL    A/G Ratio 1.1 g/dL    BUN/Creatinine Ratio 33.3 (H) 7.0 - 25.0    Anion Gap 12.0 5.0 - 15.0 mmol/L   C-reactive Protein    Collection Time: 09/03/21  2:41 AM    Specimen: Blood   Result Value Ref Range    C-Reactive Protein 2.56 (H) 0.00 - 0.50 mg/dL   D-dimer, Quantitative    Collection Time: 09/03/21  2:41 AM    Specimen: Blood   Result Value Ref Range    D-Dimer, Quantitative 0.51 0.00 - 0.59 mg/L (FEU)   CBC Auto Differential    Collection Time: 09/03/21  2:41 AM    Specimen: Blood   Result Value Ref Range    WBC 13.90 (H) 3.40 - 10.80 10*3/mm3    RBC 3.84 3.77 - 5.28 10*6/mm3    Hemoglobin 11.8 (L) 12.0 - 15.9 g/dL    Hematocrit 34.9 34.0 - 46.6 %    MCV 90.9 79.0 - 97.0 fL    MCH 30.7 26.6 - 33.0 pg    MCHC 33.8 31.5 - 35.7 g/dL    RDW 14.0 12.3 - 15.4 %    RDW-SD 45.1 37.0 - 54.0 fl    MPV 8.8 6.0 - 12.0 fL    Platelets 324 140 - 450 10*3/mm3    Neutrophil % 86.9 (H) 42.7 - 76.0 %    Lymphocyte % 6.1 (L) 19.6 - 45.3 %    Monocyte % 6.3 5.0 - 12.0 %    Eosinophil % 0.0 (L) 0.3 - 6.2 %    Basophil % 0.7 0.0 - 1.5 %    Neutrophils, Absolute 12.10 (H) 1.70 - 7.00 10*3/mm3    Lymphocytes, Absolute 0.80 0.70 - 3.10 10*3/mm3    Monocytes, Absolute 0.90 0.10 - 0.90 10*3/mm3    Eosinophils, Absolute 0.00 0.00 - 0.40 10*3/mm3    Basophils, Absolute 0.10 0.00 - 0.20 10*3/mm3    nRBC 0.0 0.0 - 0.2 /100 WBC   Bilirubin, Direct     Collection Time: 09/03/21  2:41 AM    Specimen: Blood   Result Value Ref Range    Bilirubin, Direct <0.2 0.0 - 0.3 mg/dL   POC Glucose Once    Collection Time: 09/03/21  7:14 AM    Specimen: Blood   Result Value Ref Range    Glucose 140 (H) 70 - 105 mg/dL   Calcium, Ionized    Collection Time: 09/03/21  8:39 AM    Specimen: Blood   Result Value Ref Range    Ionized Calcium 1.21 1.20 - 1.30 mmol/L   POC Glucose Once    Collection Time: 09/03/21 11:02 AM    Specimen: Blood   Result Value Ref Range    Glucose 151 (H) 70 - 105 mg/dL        Imaging:  XR Abdomen KUB  Narrative: DATE OF EXAM:  9/2/2021 11:04 PM     PROCEDURE:  XR ABDOMEN KUB-     INDICATIONS:  RUQ pain; R06.00-Dyspnea, unspecified; U07.1-COVID-19; U07.1-COVID-19;  J12.82-Pneumonia due to Coronavirus disease 2019; R09.02-Hypoxemia     COMPARISON:  Portable chest 09/01/2021     TECHNIQUE:   Single radiographic view of the abdomen was obtained.     FINDINGS:  There are right upper quadrant surgical clips. There is scattered gas  and stool in the colon. There is no gaseous distention of small bowel.  There is no gross soft tissue mass or abnormal calcification. There are  bilateral lower lung opacities greater on left than right, grossly  similar to the prior chest x-ray. There are right upper quadrant  surgical clips.     Impression: 1. Unremarkable bowel gas pattern.      Electronically Signed By-Breanna Sanchez MD On:9/2/2021 11:29 PM  This report was finalized on [id number] by  Breanna Sanchez MD.       ASSESSMENT:  Acute hypoxic respiratory failure on oxygen supplement  COVID-19  Pneumonia   NEELIMA  Asthma  Hypertension  Hypothyroidism  GERD     PLAN:  Continue to decrease O2 as tolerated to keep sats > 90%  Boost TID  OOB daily   Continue  anticoagulation  Dexa, Vit C, Zinc  Tips and advised to improve his nutrition  Bronchodilator  Inhaled corticosteroids  Electrolytes/ glycemic control  DVT and GI prophylaxis    Discussed with Dr Jerad Parks,  APRN   9/3/2021  11:35 EDT     I personally have examined  and interviewed the patient. I have reviewed the history, data, problems, assessment and plan with our NP.  Critical care time in direct medical management (   ) minutes  Electronically signed by Mathieu Mars MD D,[facility], 09/03/21, 10:38 PM EDT.

## 2021-09-03 NOTE — PLAN OF CARE
Goal Outcome Evaluation:              Outcome Summary: patient 40/100 PF today SPO2 92-93%, Patient with no pauses or apnea moments . patient still with shortness of breath with movment in bed and using the bed pan. vital signs are stable with no complaints of pain

## 2021-09-03 NOTE — PAYOR COMM NOTE
"Clinical update for case# [id number]    Patient remains on PCU requiring high flow oxygen at 100% 40L to maintain sats. Clinical info attached.   -------  CONTINUED STAY AUTHORIZATION PENDING:   PLEASE FAX OR CALL DETERMINATION TO CONTACT BELOW:       THANK YOU,    WILLEM ChaseN, RN  Utilization Review  [facility]  Phone: [phone]  Fax: [phone]      NPI: [id number]  Tax ID: [id number]    Danita Montiel (56 y.o. Female)     Date of Birth Social Security Number Address Home Phone MRN    1965  [id number]  [place] [address] [phone] [id number]    Christianity Marital Status          Bahai        Admission Date Admission Type Admitting Provider Attending Provider Department, Room/Bed    8/29/21 Emergency Korin Lopez MD Lowney, Kay, MD [facility] PROGRESS CARE, 2111/1    Discharge Date Discharge Disposition Discharge Destination                       Attending Provider: Korin Lopez MD    Allergies: Clarithromycin    Isolation: Enh Drop/Con   Infection: COVID (confirmed) (08/29/21)   Code Status: CPR    Ht: 167.6 cm (66\")   Wt: 118 kg (261 lb 3.9 oz)    Admission Cmt: None   Principal Problem: Pneumonia due to COVID-19 virus [U07.1,J12.82]                 Active Insurance as of 8/29/2021     Primary Coverage     Payor Plan Insurance Group Employer/Plan Group    CIGNA CIGNA [id number]     Payor Plan Address Payor Plan Phone Number Payor Plan Fax Number Effective Dates    [address] [phone]  4/1/2004 - None Entered    [facility] [id number]       Subscriber Name Subscriber Birth Date Member ID       MELODY MONTIEL [birth year]1962 [id number]                 Emergency Contacts      (Rel.) Home Phone Work Phone Mobile Phone    MELODY MONTIEL (Spouse) [phone] -- [phone]            Oxygen Therapy (last day)     Date/Time   SpO2   Device (Oxygen Therapy)   Flow (L/min)   Oxygen Concentration (%)   ETCO2 (mmHg)    09/03/21 1007   " 95   high-flow mask;humidified;heated   40   100   --    09/03/21 0722   93   heated;high-flow nasal cannula   40   100   --    09/03/21 0506   92   heated;high-flow nasal cannula;humidified;nonrebreather mask   40   100   --    09/03/21 0348   94   --   --   --   --    09/03/21 0347   92   heated;high-flow nasal cannula;humidified;nonrebreather mask   40   100   --    09/03/21 0217   93   heated;high-flow nasal cannula;humidified;nonrebreather mask   40   100   --    09/02/21 2346   93   --   --   --   --    09/02/21 2344   93   heated;high-flow nasal cannula;humidified;nonrebreather mask   40   100   --    09/02/21 2228   93   --   --   --   --    09/02/21 1855   92   heated;high-flow nasal cannula;humidified   40   100   --    09/02/21 1716   (!) 89   heated;high-flow nasal cannula;humidified   40   100   --    09/02/21 1600   --   humidified;high-flow nasal cannula;high-flow mask   40   100   --    09/02/21 1522   93   heated;high-flow nasal cannula   40   100   --    09/02/21 1200   --   high-flow nasal cannula;humidified   40   100   --    09/02/21 1106   96   heated;high-flow nasal cannula   40   100   --    09/02/21 0955   92   heated;high-flow mask;humidified   40   100   --    09/02/21 0800   --   heated;high-flow nasal cannula;humidified   40   100   --    09/02/21 0747   93   heated;high-flow nasal cannula   40   100   --    09/02/21 0505   98   humidified;heated;high-flow nasal cannula;nonrebreather mask   40   100   --    09/02/21 0412   91   heated;high-flow nasal cannula   40   100   --    09/02/21 0228   95   humidified;heated;high-flow nasal cannula;nonrebreather mask   40   100   --    09/02/21 0030   92   heated;high-flow nasal cannula   40   100   --              Current Facility-Administered Medications   Medication Dose Route Frequency Provider Last Rate Last Admin   • albuterol sulfate HFA (PROVENTIL HFA;VENTOLIN HFA;PROAIR HFA) inhaler 2 puff  2 puff Inhalation Q4H - RT Korin Lopez MD   2  puff at 09/03/21 0722   • ascorbic acid (VITAMIN C) tablet 500 mg  500 mg Oral Daily Korin Lopez MD   500 mg at 09/02/21 0835   • benzonatate (TESSALON) capsule 200 mg  200 mg Oral TID PRN Korin Lopez MD   200 mg at 09/02/21 2203   • budesonide-formoterol (SYMBICORT) 160-4.5 MCG/ACT inhaler 2 puff  2 puff Inhalation BID - RT Mathieu Mars MD   2 puff at 09/03/21 0722   • cefTRIAXone (ROCEPHIN) 1 g in sodium chloride 0.9 % 100 mL IVPB  1 g Intravenous Q24H Korin Lopez  mL/hr at 09/02/21 1715 1 g at 09/02/21 1715   • cholecalciferol (VITAMIN D3) tablet 1,000 Units  1,000 Units Oral Daily Korin Lopez MD   1,000 Units at 09/02/21 0837   • dexamethasone (DECADRON) injection 6 mg  6 mg Intravenous Q12H Jessica Donis APRN   6 mg at 09/02/21 2203   • dextrose (D50W) 25 g/ 50mL Intravenous Solution 25 g  25 g Intravenous Q15 Min PRN Jessica Donis APRN       • dextrose (GLUTOSE) oral gel 15 g  15 g Oral Q15 Min PRN Jessica Donis APRN       • dilTIAZem CD (CARDIZEM CD) 24 hr capsule 180 mg  180 mg Oral Q24H Ene Lewis APRN   180 mg at 09/02/21 0835   • docusate sodium (COLACE) capsule 100 mg  100 mg Oral BID Shana Diaz MD   100 mg at 09/03/21 0602   • doxycycline (ADOXA) tablet 100 mg  100 mg Oral Q12H Korin Lopez MD   100 mg at 09/02/21 2204   • FLUoxetine (PROzac) capsule 40 mg  40 mg Oral Daily Korin Lopez MD   40 mg at 09/02/21 0835   • furosemide (LASIX) injection 20 mg  20 mg Intravenous Q12H Jessica Donis APRN   20 mg at 09/02/21 2203   • glucagon (human recombinant) (GLUCAGEN DIAGNOSTIC) 1 mg in sterile water (preservative free) 1 mL injection  1 mg Subcutaneous Q15 Min PRN Jessica Donis APRN       • HYDROcod Polst-CPM Polst ER (TUSSIONEX PENNKINETIC) 10-8 MG/5ML ER suspension 5 mL  5 mL Oral Q12H PRN Mathieu Mars MD   5 mL at 09/03/21 0104   • insulin lispro (ADMELOG) injection 0-9 Units  0-9 Units Subcutaneous TID AC Jessica Donis APRN   2 Units at 09/02/21 1714     And   • insulin lispro (ADMELOG) injection 0-9 Units  0-9 Units Subcutaneous PRN Jessica Donis, APRN       • levothyroxine (SYNTHROID, LEVOTHROID) tablet 112 mcg  112 mcg Oral Q AM Korin Lopez MD   112 mcg at 09/03/21 0541   • LORazepam (ATIVAN) tablet 1 mg  1 mg Oral Q6H PRN Korin Lopez MD   1 mg at 09/02/21 2203   • melatonin tablet 5 mg  5 mg Oral Nightly PRN Korin Lopez MD   5 mg at 09/01/21 2031   • metoprolol tartrate (LOPRESSOR) injection 5 mg  5 mg Intravenous Q4H PRN Korin Lopez MD   5 mg at 09/01/21 0956   • montelukast (SINGULAIR) tablet 10 mg  10 mg Oral Nightly Korin Lopez MD   10 mg at 09/02/21 2203   • Morphine sulfate (PF) injection 2 mg  2 mg Intravenous Q2H PRN Shana Diaz MD   2 mg at 09/03/21 0104   • ondansetron (ZOFRAN) tablet 4 mg  4 mg Oral Q6H PRN Korin Lopez MD       • pantoprazole (PROTONIX) EC tablet 40 mg  40 mg Oral QAM Korin Lopez MD   40 mg at 09/03/21 0604   • Pharmacy Consult - Remdesivir   Does not apply Continuous PRN Korin Lopez MD       • polyethylene glycol (MIRALAX) packet 17 g  17 g Oral Daily Shana Diaz MD   17 g at 09/03/21 0602   • rivaroxaban (XARELTO) tablet 20 mg  20 mg Oral Daily With Dinner Korin Lopez MD   20 mg at 09/02/21 1716   • sodium chloride 0.9 % flush 10 mL  10 mL Intravenous PRN Korin Lopez MD       • sodium chloride 0.9 % flush 10 mL  10 mL Intravenous PRN Korin Lopez MD       • sodium chloride 0.9 % flush 3 mL  3 mL Intravenous Q12H Korin Lopez MD   3 mL at 09/02/21 2100   • sodium chloride 0.9 % flush 3-10 mL  3-10 mL Intravenous PRN Korin Lopez MD       • traZODone (DESYREL) tablet 50 mg  50 mg Oral Nightly Korin Lopez MD   50 mg at 09/02/21 2204   • zinc sulfate (ZINCATE) capsule 220 mg  220 mg Oral Daily Korin Lopez MD   220 mg at 09/02/21 0835     Lab Results (last 24 hours)     Procedure Component Value Units Date/Time    Calcium, Ionized [[id number]]  (Normal) Collected: 09/03/21 0839    Specimen: Blood Updated:  09/03/21 0852     Ionized Calcium 1.21 mmol/L     POC Glucose Once [[id number]]  (Abnormal) Collected: 09/03/21 0714    Specimen: Blood Updated: 09/03/21 0716     Glucose 140 mg/dL      Comment: Serial Number: [id number]:  [id number]       Comprehensive Metabolic Panel [[id number]]  (Abnormal) Collected: 09/03/21 0241    Specimen: Blood Updated: 09/03/21 0331     Glucose 145 mg/dL      BUN 32 mg/dL      Creatinine 0.96 mg/dL      Sodium 134 mmol/L      Potassium 3.8 mmol/L      Chloride 98 mmol/L      CO2 24.0 mmol/L      Calcium 7.9 mg/dL      Total Protein 5.8 g/dL      Albumin 3.10 g/dL      ALT (SGPT) 17 U/L      AST (SGOT) 21 U/L      Alkaline Phosphatase 62 U/L      Total Bilirubin 0.3 mg/dL      eGFR Non African Amer 60 mL/min/1.73      Globulin 2.7 gm/dL      A/G Ratio 1.1 g/dL      BUN/Creatinine Ratio 33.3     Anion Gap 12.0 mmol/L     Narrative:      GFR Normal >60  Chronic Kidney Disease <60  Kidney Failure <15      Bilirubin, Direct [[id number]]  (Normal) Collected: 09/03/21 0241    Specimen: Blood Updated: 09/03/21 0331     Bilirubin, Direct <0.2 mg/dL     C-reactive Protein [[id number]]  (Abnormal) Collected: 09/03/21 0241    Specimen: Blood Updated: 09/03/21 0331     C-Reactive Protein 2.56 mg/dL     D-dimer, Quantitative [[id number]]  (Normal) Collected: 09/03/21 0241    Specimen: Blood Updated: 09/03/21 0325     D-Dimer, Quantitative 0.51 mg/L (FEU)     Narrative:      Reference Range  --------------------------------------------------------------------     < 0.50   Negative Predictive Value  0.50-0.59   Indeterminate    >= 0.60   Probable VTE             A very low percentage of patients with DVT may yield D-Dimer results   below the cut-off of 0.50 mg/L FEU.  This is known to be more   prevalent in patients with distal DVT.             Results of this test should always be interpreted in conjunction with   the patient's medical history, clinical presentation and other   findings.  Clinical  diagnosis should not be based on the result of   INNOVANCE D-Dimer alone.    CBC & Differential [[id number]]  (Abnormal) Collected: 09/03/21 0241    Specimen: Blood Updated: 09/03/21 0303    Narrative:      The following orders were created for panel order CBC & Differential.  Procedure                               Abnormality         Status                     ---------                               -----------         ------                     CBC Auto Differential[[id number]]        Abnormal            Final result                 Please view results for these tests on the individual orders.    CBC Auto Differential [[id number]]  (Abnormal) Collected: 09/03/21 0241    Specimen: Blood Updated: 09/03/21 0303     WBC 13.90 10*3/mm3      RBC 3.84 10*6/mm3      Hemoglobin 11.8 g/dL      Hematocrit 34.9 %      MCV 90.9 fL      MCH 30.7 pg      MCHC 33.8 g/dL      RDW 14.0 %      RDW-SD 45.1 fl      MPV 8.8 fL      Platelets 324 10*3/mm3      Neutrophil % 86.9 %      Lymphocyte % 6.1 %      Monocyte % 6.3 %      Eosinophil % 0.0 %      Basophil % 0.7 %      Neutrophils, Absolute 12.10 10*3/mm3      Lymphocytes, Absolute 0.80 10*3/mm3      Monocytes, Absolute 0.90 10*3/mm3      Eosinophils, Absolute 0.00 10*3/mm3      Basophils, Absolute 0.10 10*3/mm3      nRBC 0.0 /100 WBC     POC Glucose Once [[id number]]  (Abnormal) Collected: 09/02/21 2033    Specimen: Blood Updated: 09/02/21 2034     Glucose 164 mg/dL      Comment: Serial Number: [id number]:  [id number]       POC Glucose Once [[id number]]  (Abnormal) Collected: 09/02/21 1638    Specimen: Blood Updated: 09/02/21 1639     Glucose 155 mg/dL      Comment: Serial Number: [id number]:  [id number]       Blood Culture - Blood, Arm, Right [[id number]] Collected: 08/30/21 1549    Specimen: Blood from Arm, Right Updated: 09/02/21 1630     Blood Culture No growth at 3 days    Blood Culture - Blood, Hand, Left [[id number]] Collected: 08/30/21 1549    Specimen: Blood from  Hand, Left Updated: 09/02/21 1630     Blood Culture No growth at 3 days        Imaging Results (Last 7 Days)     Procedure Component Value Units Date/Time    XR Abdomen KUB [[id number]] Collected: 09/02/21 2327     Updated: 09/02/21 2331    Narrative:      DATE OF EXAM:  9/2/2021 11:04 PM     PROCEDURE:  XR ABDOMEN KUB-     INDICATIONS:  RUQ pain; R06.00-Dyspnea, unspecified; U07.1-COVID-19; U07.1-COVID-19;  J12.82-Pneumonia due to Coronavirus disease 2019; R09.02-Hypoxemia     COMPARISON:  Portable chest 09/01/2021     TECHNIQUE:   Single radiographic view of the abdomen was obtained.     FINDINGS:  There are right upper quadrant surgical clips. There is scattered gas  and stool in the colon. There is no gaseous distention of small bowel.  There is no gross soft tissue mass or abnormal calcification. There are  bilateral lower lung opacities greater on left than right, grossly  similar to the prior chest x-ray. There are right upper quadrant  surgical clips.       Impression:      1. Unremarkable bowel gas pattern.      Electronically Signed By-Breanna Sanchez MD On:9/2/2021 11:29 PM  This report was finalized on [id number] by  Breanna Sanchez MD.    XR Chest 1 View [[id number]] Collected: 09/01/21 0759     Updated: 09/01/21 0802    Narrative:      XR CHEST 1 VW-     Date of Exam: 9/1/2021 5:56 AM     Indication: Shortness of breath; R06.00-Dyspnea, unspecified;  U07.1-COVID-19; U07.1-COVID-19; J12.82-Pneumonia due to Coronavirus  disease 2019; R09.02-Hypoxemia.     Comparison Exams: August 31, 2021     Technique: Single AP chest radiograph     FINDINGS:  Patchy bilateral airspace opacities appear slightly worse on the left.  The heart and mediastinal contours appear stable. There is a small left  pleural effusion. The osseous structures appear intact.       Impression:      1.Patchy bilateral airspace opacities appear slightly worse on the left,  suggesting pneumonia.  2.Small left pleural effusion.      Electronically Signed By-Alex Adame MD On:9/1/2021 7:59 AM  This report was finalized on [id number] by  Alex Adame MD.    XR Chest 1 View [[id number]] Collected: 08/31/21 0748     Updated: 08/31/21 0751    Narrative:      XR CHEST 1 VW-     Date of Exam: 8/31/2021 5:02 AM     Indication: Shortness of breath; R06.00-Dyspnea, unspecified;  U07.1-COVID-19; U07.1-COVID-19; J12.82-Pneumonia due to Coronavirus  disease 2019; R09.02-Hypoxemia.     Comparison Exams: August 29, 2021     Technique: Single AP chest radiograph     FINDINGS:  Patchy bilateral airspace opacities appear slightly worse. The heart and  mediastinal contours appear stable. The osseous structures appear  intact.       Impression:      Patchy bilateral airspace opacities appear slightly worse, suggesting  pneumonia.     Electronically Signed By-Alex Adame MD On:8/31/2021 7:49 AM  This report was finalized on [id number] by  Alex Adame MD.    XR Chest 1 View [[id number]] Collected: 08/29/21 1307     Updated: 08/29/21 1310    Narrative:      DATE OF EXAM:  8/29/2021 12:40 PM     PROCEDURE:  XR CHEST 1 VW-     INDICATIONS:  Hypoxia, Covid 19 infection, shortness of breath.      COMPARISON:  4/17/2019.     TECHNIQUE:   Single radiographic view of the chest was obtained.     FINDINGS:  There are patchy areas of consolidation and increased interstitial  markings consistent with multifocal pneumonia as seen with the Covid 19  infection. The lungs and pleural spaces are otherwise clear. The heart  size is normal.  There is degenerative spondylosis of the thoracic  spine.       Impression:      Abnormal appearance of the lungs which can be seen with Covid 19  pneumonia.     Electronically Signed By-Hubert Campbell MD On:8/29/2021 1:08 PM  This report was finalized on [id number] by  Hubert Campbell MD.           Physician Progress Notes (last 24 hours) (Notes from 09/02/21 1101 through 09/03/21 1101)      Shana Diaz MD at 09/02/21  1707           LOS: 4 days   Patient Care Team:  Jaya Harper MD as PCP - General  GriefJaya MD as PCP - Family Medicine    Subjective     Interval History: on PF 40/100    Patient Complaints: pt states that she is doing some better.  Still very SOB, weak    History taken from: patient    Review of Systems   Constitutional: Positive for activity change, appetite change and fatigue. Negative for fever.   Respiratory: Positive for cough, chest tightness and shortness of breath.    Cardiovascular: Negative.    Gastrointestinal: Negative.    Musculoskeletal: Positive for arthralgias.   Neurological: Positive for weakness.           Objective     Vital Signs  Temp:  [96.7 °F (35.9 °C)-97.7 °F (36.5 °C)] 97.3 °F (36.3 °C)  Heart Rate:  [] 80  Resp:  [20-26] 24  BP: (114-151)/(51-84) 115/83    Physical Exam:     General Appearance:    Alert, cooperative, mild resp distress   Head:    Normocephalic, without obvious abnormality, atraumatic   Eyes:            Lids and lashes normal, conjunctivae and sclerae normal, no   icterus, no pallor, corneas clear, PERRLA   Ears:    Ears appear intact with no abnormalities noted   Throat:   No oral lesions, no thrush, oral mucosa moist   Neck:   No adenopathy, supple, trachea midline, no thyromegaly, no   carotid bruit, no JVD   Lungs:     Course breath sounds    Heart:    Regular rhythm and normal rate, normal S1 and S2, no            murmur, no gallop, no rub, no click   Chest Wall:    No abnormalities observed   Abdomen:     Normal bowel sounds, no masses, no organomegaly, soft        non-tender, non-distended, no guarding, no rebound                tenderness   Extremities:   Moves all extremities well, no edema, no cyanosis, no             redness   Pulses:   Pulses palpable and equal bilaterally   Skin:   No bleeding, bruising or rash   Lymph nodes:   No palpable adenopathy   Neurologic:   Cranial nerves 2 - 12 grossly intact, sensation intact, DTR        present and equal bilaterally        Results Review:    Lab Results (last 24 hours)     Procedure Component Value Units Date/Time    POC Glucose Once [[id number]]  (Abnormal) Collected: 09/02/21 1638    Specimen: Blood Updated: 09/02/21 1639     Glucose 155 mg/dL      Comment: Serial Number: [id number]:  [id number]       Blood Culture - Blood, Arm, Right [[id number]] Collected: 08/30/21 1549    Specimen: Blood from Arm, Right Updated: 09/02/21 1630     Blood Culture No growth at 3 days    Blood Culture - Blood, Hand, Left [[id number]] Collected: 08/30/21 1549    Specimen: Blood from Hand, Left Updated: 09/02/21 1630     Blood Culture No growth at 3 days    Blood Culture - Blood, Arm, Left [[id number]] Collected: 08/29/21 1227    Specimen: Blood from Arm, Left Updated: 09/02/21 1245     Blood Culture No growth at 4 days    Blood Culture - Blood, Arm, Left [[id number]] Collected: 08/29/21 1215    Specimen: Blood from Arm, Left Updated: 09/02/21 1230     Blood Culture No growth at 4 days    POC Glucose Once [[id number]]  (Abnormal) Collected: 09/02/21 1112    Specimen: Blood Updated: 09/02/21 1114     Glucose 142 mg/dL      Comment: Serial Number: [id number]:  [id number]       POC Glucose Once [[id number]]  (Abnormal) Collected: 09/02/21 0727    Specimen: Blood Updated: 09/02/21 0728     Glucose 148 mg/dL      Comment: Serial Number: [id number]:  [id number]       Comprehensive Metabolic Panel [[id number]]  (Abnormal) Collected: 09/02/21 0227    Specimen: Blood Updated: 09/02/21 0304     Glucose 153 mg/dL      BUN 38 mg/dL      Creatinine 1.07 mg/dL      Sodium 133 mmol/L      Potassium 4.1 mmol/L      Chloride 97 mmol/L      CO2 24.0 mmol/L      Calcium 8.3 mg/dL      Total Protein 6.0 g/dL      Albumin 3.10 g/dL      ALT (SGPT) 18 U/L      AST (SGOT) 23 U/L      Alkaline Phosphatase 63 U/L      Total Bilirubin 0.3 mg/dL      eGFR Non African Amer 53 mL/min/1.73      Globulin 2.9 gm/dL      A/G Ratio 1.1 g/dL       BUN/Creatinine Ratio 35.5     Anion Gap 12.0 mmol/L     Narrative:      GFR Normal >60  Chronic Kidney Disease <60  Kidney Failure <15      C-reactive Protein [[id number]]  (Abnormal) Collected: 09/02/21 0227    Specimen: Blood Updated: 09/02/21 0304     C-Reactive Protein 4.51 mg/dL     Bilirubin, Direct [[id number]]  (Normal) Collected: 09/02/21 0227    Specimen: Blood Updated: 09/02/21 0304     Bilirubin, Direct <0.2 mg/dL     D-dimer, Quantitative [[id number]]  (Normal) Collected: 09/02/21 0227    Specimen: Blood Updated: 09/02/21 0248     D-Dimer, Quantitative 0.48 mg/L (FEU)     Narrative:      Reference Range  --------------------------------------------------------------------     < 0.50   Negative Predictive Value  0.50-0.59   Indeterminate    >= 0.60   Probable VTE             A very low percentage of patients with DVT may yield D-Dimer results   below the cut-off of 0.50 mg/L FEU.  This is known to be more   prevalent in patients with distal DVT.             Results of this test should always be interpreted in conjunction with   the patient's medical history, clinical presentation and other   findings.  Clinical diagnosis should not be based on the result of   INNOVANCE D-Dimer alone.    CBC & Differential [[id number]]  (Abnormal) Collected: 09/02/21 0227    Specimen: Blood Updated: 09/02/21 0240    Narrative:      The following orders were created for panel order CBC & Differential.  Procedure                               Abnormality         Status                     ---------                               -----------         ------                     CBC Auto Differential[[id number]]        Abnormal            Final result                 Please view results for these tests on the individual orders.    CBC Auto Differential [[id number]]  (Abnormal) Collected: 09/02/21 0227    Specimen: Blood Updated: 09/02/21 0240     WBC 11.80 10*3/mm3      RBC 3.97 10*6/mm3      Hemoglobin 12.6 g/dL      Hematocrit  37.2 %      MCV 93.6 fL      MCH 31.7 pg      MCHC 33.9 g/dL      RDW 14.1 %      RDW-SD 45.1 fl      MPV 9.3 fL      Platelets 272 10*3/mm3      Neutrophil % 87.0 %      Lymphocyte % 6.7 %      Monocyte % 6.3 %      Eosinophil % 0.0 %      Basophil % 0.0 %      Neutrophils, Absolute 10.30 10*3/mm3      Lymphocytes, Absolute 0.80 10*3/mm3      Monocytes, Absolute 0.70 10*3/mm3      Eosinophils, Absolute 0.00 10*3/mm3      Basophils, Absolute 0.00 10*3/mm3      nRBC 0.0 /100 WBC            Imaging Results (Last 24 Hours)     ** No results found for the last 24 hours. **               I reviewed the patient's new clinical results.    Medication Review:   Scheduled Meds:albuterol sulfate HFA, 2 puff, Inhalation, Q4H - RT  ascorbic acid, 500 mg, Oral, Daily  budesonide-formoterol, 2 puff, Inhalation, BID - RT  cefTRIAXone, 1 g, Intravenous, Q24H  cholecalciferol, 1,000 Units, Oral, Daily  dexamethasone, 6 mg, Intravenous, Q12H  dilTIAZem CD, 180 mg, Oral, Q24H  doxycycline, 100 mg, Oral, Q12H  FLUoxetine, 40 mg, Oral, Daily  furosemide, 20 mg, Intravenous, Q12H  insulin lispro, 0-9 Units, Subcutaneous, TID AC  levothyroxine, 112 mcg, Oral, Q AM  montelukast, 10 mg, Oral, Nightly  pantoprazole, 40 mg, Oral, QAM  remdesivir, 100 mg, Intravenous, Q24H  rivaroxaban, 20 mg, Oral, Daily With Dinner  sodium chloride, 3 mL, Intravenous, Q12H  traZODone, 50 mg, Oral, Nightly  zinc sulfate, 220 mg, Oral, Daily      Continuous Infusions:Pharmacy Consult - Remdesivir,       PRN Meds:.benzonatate  •  dextrose  •  dextrose  •  glucagon (human recombinant)  •  HYDROcod Polst-CPM Polst ER  •  insulin lispro **AND** insulin lispro  •  LORazepam  •  melatonin  •  metoprolol tartrate  •  ondansetron  •  Pharmacy Consult - Remdesivir  •  sodium chloride  •  [COMPLETED] Insert peripheral IV **AND** sodium chloride  •  sodium chloride     Assessment/Plan       Pneumonia due to COVID-19 virus    Hypoxia  - improved slightly today.  Pulmonary  "following.  Continue current tx - remdesivir, rocephin, doxycycline, bronchodilators,  O2, decadron, vit c, zinc  Afib with RVR - improved.  Cardiology following.    NEELIMA  Asthma  HTN  Hypothyroidism  GERD    Plan for disposition:LOLY Diaz MD  21  17:07 EDT      Electronically signed by Shana Diaz MD at 21 1845     Mathieu Mars MD at 21 1429          PULMONARY CRITICAL CARE Progress  NOTE      PATIENT IDENTIFICATION:  Name: Danita Montiel  MRN: [id number]  :  1965     Age: 56 y.o.  Sex: female    DATE OF Note:  2021   Referring Physician: Korin Lopez MD                  Subjective:   Currently on PF 40/100,   no chest or abd pain, no bowel or bladder issues    Objective:  tMax 24 hrs: Temp (24hrs), Av.2 °F (36.2 °C), Min:96.7 °F (35.9 °C), Max:97.7 °F (36.5 °C)      Vitals Ranges:   Temp:  [96.7 °F (35.9 °C)-97.7 °F (36.5 °C)] 97.3 °F (36.3 °C)  Heart Rate:  [] 63  Resp:  [20-28] 24  BP: (114-151)/(51-84) 115/83    Intake and Output Last 3 Shifts:   I/O last 3 completed shifts:  In: 1080 [P.O.:1080]  Out: 1450 [Urine:1450]    Exam:  /83   Pulse 63   Temp 97.3 °F (36.3 °C) (Axillary)   Resp 24   Ht 167.6 cm (66\")   Wt 118 kg (261 lb 3.9 oz) Comment: bed needs zeroed   LMP  (LMP Unknown)   SpO2 96%   BMI 42.17 kg/m²     General Appearance: Alert    HEENT:  Normocephalic, without obvious abnormality, Conjunctiva/corneas clear,.  Normal external ear canals, Nares normal, no drainage     Neck:  Supple, symmetrical, trachea midline. No JVD.  Lungs /Chest wall:   Bilateral basal rhonchi, respirations unlabored symmetrical wall movement.     Heart:  Regular rate and rhythm, systolic murmur PMI left sternal border  Abdomen: Soft, non-tender, no masses, no organomegaly.    Extremities: Trace edema no clubbing or Cyanosis        Medications:    Current Facility-Administered Medications:   •  albuterol sulfate HFA (PROVENTIL HFA;VENTOLIN HFA;PROAIR " HFA) inhaler 2 puff, 2 puff, Inhalation, Q4H - RT, Korin Lopez MD, 2 puff at 09/02/21 1106  •  ascorbic acid (VITAMIN C) tablet 500 mg, 500 mg, Oral, Daily, Korin Lopez MD, 500 mg at 09/02/21 0835  •  benzonatate (TESSALON) capsule 200 mg, 200 mg, Oral, TID PRN, Korin Lopez MD, 200 mg at 08/30/21 2126  •  budesonide-formoterol (SYMBICORT) 160-4.5 MCG/ACT inhaler 2 puff, 2 puff, Inhalation, BID - RT, Mathieu Mars MD, 2 puff at 09/02/21 0747  •  cefTRIAXone (ROCEPHIN) 1 g in sodium chloride 0.9 % 100 mL IVPB, 1 g, Intravenous, Q24H, Korin Lopez MD, Last Rate: 200 mL/hr at 09/01/21 1414, 1 g at 09/01/21 1414  •  cholecalciferol (VITAMIN D3) tablet 1,000 Units, 1,000 Units, Oral, Daily, Korin Lopez MD, 1,000 Units at 09/02/21 0837  •  dexamethasone (DECADRON) injection 6 mg, 6 mg, Intravenous, Q12H, Jessica Donis APRN, 6 mg at 09/02/21 0835  •  dextrose (D50W) 25 g/ 50mL Intravenous Solution 25 g, 25 g, Intravenous, Q15 Min PRN, Jessica Donis APRN  •  dextrose (GLUTOSE) oral gel 15 g, 15 g, Oral, Q15 Min PRN, Jessica Donis APRN  •  dilTIAZem CD (CARDIZEM CD) 24 hr capsule 180 mg, 180 mg, Oral, Q24H, Ene Lewis APRN, 180 mg at 09/02/21 0835  •  doxycycline (ADOXA) tablet 100 mg, 100 mg, Oral, Q12H, Korin Lopez MD, 100 mg at 09/02/21 0835  •  FLUoxetine (PROzac) capsule 40 mg, 40 mg, Oral, Daily, Korin Lopez MD, 40 mg at 09/02/21 0835  •  furosemide (LASIX) injection 20 mg, 20 mg, Intravenous, Q12H, Jessica Donis APRN, 20 mg at 09/02/21 0836  •  glucagon (human recombinant) (GLUCAGEN DIAGNOSTIC) 1 mg in sterile water (preservative free) 1 mL injection, 1 mg, Subcutaneous, Q15 Min PRN, Jessica Donis APRN  •  HYDROcod Polst-CPM Polst ER (TUSSIONEX PENNKINETIC) 10-8 MG/5ML ER suspension 5 mL, 5 mL, Oral, Q12H PRN, Mathieu Mars MD, 5 mL at 09/01/21 2030  •  insulin lispro (ADMELOG) injection 0-9 Units, 0-9 Units, Subcutaneous, TID AC **AND** insulin lispro (ADMELOG) injection 0-9  Units, 0-9 Units, Subcutaneous, PRN, Jessica Donis, APRN  •  levothyroxine (SYNTHROID, LEVOTHROID) tablet 112 mcg, 112 mcg, Oral, Q AM, Korin Lopez MD, 112 mcg at 09/02/21 0600  •  LORazepam (ATIVAN) tablet 1 mg, 1 mg, Oral, Q6H PRN, Korin Lopez MD, 1 mg at 09/01/21 2031  •  melatonin tablet 5 mg, 5 mg, Oral, Nightly PRN, Korin Lopez MD, 5 mg at 09/01/21 2031  •  metoprolol tartrate (LOPRESSOR) injection 5 mg, 5 mg, Intravenous, Q4H PRN, Korin Lopez MD, 5 mg at 09/01/21 0956  •  montelukast (SINGULAIR) tablet 10 mg, 10 mg, Oral, Nightly, Korin Lopez MD, 10 mg at 09/01/21 2031  •  ondansetron (ZOFRAN) tablet 4 mg, 4 mg, Oral, Q6H PRN, Korin Lopez MD  •  pantoprazole (PROTONIX) EC tablet 40 mg, 40 mg, Oral, QAM, Korin Lopez MD, 40 mg at 09/02/21 0601  •  Pharmacy Consult - Remdesivir, , Does not apply, Continuous PRN, Korin Lopez MD  •  [COMPLETED] remdesivir 200 mg in sodium chloride 0.9 % 290 mL IVPB (powder vial), 200 mg, Intravenous, Q24H, 200 mg at 08/29/21 1919 **FOLLOWED BY** remdesivir 100 mg in sodium chloride 0.9 % 250 mL IVPB (powder vial), 100 mg, Intravenous, Q24H, Korin Lopez MD, 100 mg at 09/01/21 1804  •  rivaroxaban (XARELTO) tablet 20 mg, 20 mg, Oral, Daily With Dinner, Korin Lopez MD, 20 mg at 09/01/21 1804  •  sodium chloride 0.9 % flush 10 mL, 10 mL, Intravenous, PRN, Korin Lopez MD  •  [COMPLETED] Insert peripheral IV, , , Once **AND** sodium chloride 0.9 % flush 10 mL, 10 mL, Intravenous, PRN, Korin Lopez MD  •  sodium chloride 0.9 % flush 3 mL, 3 mL, Intravenous, Q12H, Korin Lopez MD, 3 mL at 09/02/21 0837  •  sodium chloride 0.9 % flush 3-10 mL, 3-10 mL, Intravenous, PRN, Korin Lopez MD  •  traZODone (DESYREL) tablet 50 mg, 50 mg, Oral, Nightly, Korin Lopez MD, 50 mg at 09/01/21 2031  •  zinc sulfate (ZINCATE) capsule 220 mg, 220 mg, Oral, Daily, Korin Lopez MD, 220 mg at 09/02/21 0835    Data Review:  All labs (24hrs):   Recent Results (from the past 24 hour(s))   POC  Glucose Once    Collection Time: 09/01/21  5:06 PM    Specimen: Blood   Result Value Ref Range    Glucose 144 (H) 70 - 105 mg/dL   POC Glucose Once    Collection Time: 09/01/21  7:14 PM    Specimen: Blood   Result Value Ref Range    Glucose 157 (H) 70 - 105 mg/dL   Comprehensive Metabolic Panel    Collection Time: 09/02/21  2:27 AM    Specimen: Blood   Result Value Ref Range    Glucose 153 (H) 65 - 99 mg/dL    BUN 38 (H) 6 - 20 mg/dL    Creatinine 1.07 (H) 0.57 - 1.00 mg/dL    Sodium 133 (L) 136 - 145 mmol/L    Potassium 4.1 3.5 - 5.2 mmol/L    Chloride 97 (L) 98 - 107 mmol/L    CO2 24.0 22.0 - 29.0 mmol/L    Calcium 8.3 (L) 8.6 - 10.5 mg/dL    Total Protein 6.0 6.0 - 8.5 g/dL    Albumin 3.10 (L) 3.50 - 5.20 g/dL    ALT (SGPT) 18 1 - 33 U/L    AST (SGOT) 23 1 - 32 U/L    Alkaline Phosphatase 63 39 - 117 U/L    Total Bilirubin 0.3 0.0 - 1.2 mg/dL    eGFR Non African Amer 53 (L) >60 mL/min/1.73    Globulin 2.9 gm/dL    A/G Ratio 1.1 g/dL    BUN/Creatinine Ratio 35.5 (H) 7.0 - 25.0    Anion Gap 12.0 5.0 - 15.0 mmol/L   C-reactive Protein    Collection Time: 09/02/21  2:27 AM    Specimen: Blood   Result Value Ref Range    C-Reactive Protein 4.51 (H) 0.00 - 0.50 mg/dL   D-dimer, Quantitative    Collection Time: 09/02/21  2:27 AM    Specimen: Blood   Result Value Ref Range    D-Dimer, Quantitative 0.48 0.00 - 0.59 mg/L (FEU)   CBC Auto Differential    Collection Time: 09/02/21  2:27 AM    Specimen: Blood   Result Value Ref Range    WBC 11.80 (H) 3.40 - 10.80 10*3/mm3    RBC 3.97 3.77 - 5.28 10*6/mm3    Hemoglobin 12.6 12.0 - 15.9 g/dL    Hematocrit 37.2 34.0 - 46.6 %    MCV 93.6 79.0 - 97.0 fL    MCH 31.7 26.6 - 33.0 pg    MCHC 33.9 31.5 - 35.7 g/dL    RDW 14.1 12.3 - 15.4 %    RDW-SD 45.1 37.0 - 54.0 fl    MPV 9.3 6.0 - 12.0 fL    Platelets 272 140 - 450 10*3/mm3    Neutrophil % 87.0 (H) 42.7 - 76.0 %    Lymphocyte % 6.7 (L) 19.6 - 45.3 %    Monocyte % 6.3 5.0 - 12.0 %    Eosinophil % 0.0 (L) 0.3 - 6.2 %    Basophil %  0.0 0.0 - 1.5 %    Neutrophils, Absolute 10.30 (H) 1.70 - 7.00 10*3/mm3    Lymphocytes, Absolute 0.80 0.70 - 3.10 10*3/mm3    Monocytes, Absolute 0.70 0.10 - 0.90 10*3/mm3    Eosinophils, Absolute 0.00 0.00 - 0.40 10*3/mm3    Basophils, Absolute 0.00 0.00 - 0.20 10*3/mm3    nRBC 0.0 0.0 - 0.2 /100 WBC   Bilirubin, Direct    Collection Time: 09/02/21  2:27 AM    Specimen: Blood   Result Value Ref Range    Bilirubin, Direct <0.2 0.0 - 0.3 mg/dL   POC Glucose Once    Collection Time: 09/02/21  7:27 AM    Specimen: Blood   Result Value Ref Range    Glucose 148 (H) 70 - 105 mg/dL   POC Glucose Once    Collection Time: 09/02/21 11:12 AM    Specimen: Blood   Result Value Ref Range    Glucose 142 (H) 70 - 105 mg/dL        Imaging:  XR Chest 1 View  Narrative: XR CHEST 1 VW-     Date of Exam: 9/1/2021 5:56 AM     Indication: Shortness of breath; R06.00-Dyspnea, unspecified;  U07.1-COVID-19; U07.1-COVID-19; J12.82-Pneumonia due to Coronavirus  disease 2019; R09.02-Hypoxemia.     Comparison Exams: August 31, 2021     Technique: Single AP chest radiograph     FINDINGS:  Patchy bilateral airspace opacities appear slightly worse on the left.  The heart and mediastinal contours appear stable. There is a small left  pleural effusion. The osseous structures appear intact.     Impression: 1.Patchy bilateral airspace opacities appear slightly worse on the left,  suggesting pneumonia.  2.Small left pleural effusion.     Electronically Signed By-Alex Adame MD On:9/1/2021 7:59 AM  This report was finalized on [id number] by  Alex Adame MD.       ASSESSMENT:  Acute hypoxic respiratory failure on oxygen supplement  COVID-19  Pneumonia   NEELIMA  Asthma  Hypertension  Hypothyroidism  GERD     PLAN:  Boost TID  OOB daily   Continue  anticoagulation  Continue oxygen support to keep sat > 90  Remdesivir, azithromycin, Dexa, Vit C, Zinc  Tips and advised to improve his nutrition  Bronchodilator  Inhaled corticosteroids  Electrolytes/  glycemic control  DVT and GI prophylaxis    Discussed with Dr Jerad Parks, APRN   9/2/2021  14:29 EDT     I personally have examined  and interviewed the patient. I have reviewed the history, data, problems, assessment and plan with our NP.  Critical care time in direct medical management (   ) minutes  Electronically signed by Mathieu Mars MD, D,ABS, 09/02/21, 5:45 PM EDT.         Electronically signed by Mathieu Mars MD at 09/02/21 7308

## 2021-09-04 LAB
ALBUMIN SERPL-MCNC: 3.1 G/DL (ref 3.5–5.2)
ALBUMIN/GLOB SERPL: 1.1 G/DL
ALP SERPL-CCNC: 71 U/L (ref 39–117)
ALT SERPL W P-5'-P-CCNC: 16 U/L (ref 1–33)
ANION GAP SERPL CALCULATED.3IONS-SCNC: 10 MMOL/L (ref 5–15)
AST SERPL-CCNC: 22 U/L (ref 1–32)
BACTERIA SPEC AEROBE CULT: NORMAL
BACTERIA SPEC AEROBE CULT: NORMAL
BILIRUB CONJ SERPL-MCNC: <0.2 MG/DL (ref 0–0.3)
BILIRUB SERPL-MCNC: 0.3 MG/DL (ref 0–1.2)
BUN SERPL-MCNC: 31 MG/DL (ref 6–20)
BUN/CREAT SERPL: 32 (ref 7–25)
CALCIUM SPEC-SCNC: 8.2 MG/DL (ref 8.6–10.5)
CHLORIDE SERPL-SCNC: 96 MMOL/L (ref 98–107)
CO2 SERPL-SCNC: 26 MMOL/L (ref 22–29)
CREAT SERPL-MCNC: 0.97 MG/DL (ref 0.57–1)
CRP SERPL-MCNC: 1.53 MG/DL (ref 0–0.5)
D DIMER PPP FEU-MCNC: 0.83 MG/L (FEU) (ref 0–0.59)
DEPRECATED RDW RBC AUTO: 43.8 FL (ref 37–54)
ERYTHROCYTE [DISTWIDTH] IN BLOOD BY AUTOMATED COUNT: 13.7 % (ref 12.3–15.4)
GFR SERPL CREATININE-BSD FRML MDRD: 59 ML/MIN/1.73
GLOBULIN UR ELPH-MCNC: 2.9 GM/DL
GLUCOSE BLDC GLUCOMTR-MCNC: 130 MG/DL (ref 70–105)
GLUCOSE BLDC GLUCOMTR-MCNC: 152 MG/DL (ref 70–105)
GLUCOSE BLDC GLUCOMTR-MCNC: 166 MG/DL (ref 70–105)
GLUCOSE SERPL-MCNC: 139 MG/DL (ref 65–99)
HCT VFR BLD AUTO: 35.6 % (ref 34–46.6)
HGB BLD-MCNC: 12.1 G/DL (ref 12–15.9)
LYMPHOCYTES # BLD MANUAL: 1.51 10*3/MM3 (ref 0.7–3.1)
LYMPHOCYTES NFR BLD MANUAL: 10 % (ref 19.6–45.3)
LYMPHOCYTES NFR BLD MANUAL: 7 % (ref 5–12)
MCH RBC QN AUTO: 31.7 PG (ref 26.6–33)
MCHC RBC AUTO-ENTMCNC: 34 G/DL (ref 31.5–35.7)
MCV RBC AUTO: 93.2 FL (ref 79–97)
MONOCYTES # BLD AUTO: 1.06 10*3/MM3 (ref 0.1–0.9)
NEUTROPHILS # BLD AUTO: 12.53 10*3/MM3 (ref 1.7–7)
NEUTROPHILS NFR BLD MANUAL: 80 % (ref 42.7–76)
NEUTS BAND NFR BLD MANUAL: 3 % (ref 0–5)
PLATELET # BLD AUTO: 345 10*3/MM3 (ref 140–450)
PMV BLD AUTO: 8.6 FL (ref 6–12)
POTASSIUM SERPL-SCNC: 4.2 MMOL/L (ref 3.5–5.2)
PROT SERPL-MCNC: 6 G/DL (ref 6–8.5)
RBC # BLD AUTO: 3.81 10*6/MM3 (ref 3.77–5.28)
RBC MORPH BLD: NORMAL
SCAN SLIDE: NORMAL
SMALL PLATELETS BLD QL SMEAR: ADEQUATE
SODIUM SERPL-SCNC: 132 MMOL/L (ref 136–145)
WBC # BLD AUTO: 15.1 10*3/MM3 (ref 3.4–10.8)
WBC MORPH BLD: NORMAL

## 2021-09-04 PROCEDURE — 80053 COMPREHEN METABOLIC PANEL: CPT | Performed by: INTERNAL MEDICINE

## 2021-09-04 PROCEDURE — 82962 GLUCOSE BLOOD TEST: CPT

## 2021-09-04 PROCEDURE — 85007 BL SMEAR W/DIFF WBC COUNT: CPT | Performed by: INTERNAL MEDICINE

## 2021-09-04 PROCEDURE — 25010000002 DEXAMETHASONE PER 1 MG: Performed by: NURSE PRACTITIONER

## 2021-09-04 PROCEDURE — 82248 BILIRUBIN DIRECT: CPT | Performed by: INTERNAL MEDICINE

## 2021-09-04 PROCEDURE — 85025 COMPLETE CBC W/AUTO DIFF WBC: CPT | Performed by: INTERNAL MEDICINE

## 2021-09-04 PROCEDURE — 94799 UNLISTED PULMONARY SVC/PX: CPT

## 2021-09-04 PROCEDURE — 86140 C-REACTIVE PROTEIN: CPT | Performed by: INTERNAL MEDICINE

## 2021-09-04 PROCEDURE — 25010000002 FUROSEMIDE PER 20 MG: Performed by: NURSE PRACTITIONER

## 2021-09-04 PROCEDURE — 63710000001 INSULIN LISPRO (HUMAN) PER 5 UNITS: Performed by: NURSE PRACTITIONER

## 2021-09-04 PROCEDURE — 99232 SBSQ HOSP IP/OBS MODERATE 35: CPT | Performed by: INTERNAL MEDICINE

## 2021-09-04 PROCEDURE — 25010000002 MORPHINE PER 10 MG: Performed by: FAMILY MEDICINE

## 2021-09-04 PROCEDURE — 25010000002 CEFTRIAXONE PER 250 MG: Performed by: INTERNAL MEDICINE

## 2021-09-04 PROCEDURE — 36415 COLL VENOUS BLD VENIPUNCTURE: CPT | Performed by: INTERNAL MEDICINE

## 2021-09-04 PROCEDURE — 85379 FIBRIN DEGRADATION QUANT: CPT | Performed by: INTERNAL MEDICINE

## 2021-09-04 RX ADMIN — Medication 3 ML: at 20:56

## 2021-09-04 RX ADMIN — INSULIN LISPRO 2 UNITS: 100 INJECTION, SOLUTION INTRAVENOUS; SUBCUTANEOUS at 13:19

## 2021-09-04 RX ADMIN — RIVAROXABAN 20 MG: 20 TABLET, FILM COATED ORAL at 18:43

## 2021-09-04 RX ADMIN — BUDESONIDE AND FORMOTEROL FUMARATE DIHYDRATE 2 PUFF: 160; 4.5 AEROSOL RESPIRATORY (INHALATION) at 19:38

## 2021-09-04 RX ADMIN — MONTELUKAST 10 MG: 10 TABLET, FILM COATED ORAL at 21:05

## 2021-09-04 RX ADMIN — TRAZODONE HYDROCHLORIDE 50 MG: 50 TABLET ORAL at 21:06

## 2021-09-04 RX ADMIN — Medication 5 MG: at 21:06

## 2021-09-04 RX ADMIN — LORAZEPAM 1 MG: 1 TABLET ORAL at 21:06

## 2021-09-04 RX ADMIN — POLYETHYLENE GLYCOL 3350 17 G: 17 POWDER, FOR SOLUTION ORAL at 09:18

## 2021-09-04 RX ADMIN — Medication 220 MG: at 09:18

## 2021-09-04 RX ADMIN — CEFTRIAXONE SODIUM 1 G: 1 INJECTION, POWDER, FOR SOLUTION INTRAMUSCULAR; INTRAVENOUS at 13:19

## 2021-09-04 RX ADMIN — DEXAMETHASONE SODIUM PHOSPHATE 6 MG: 4 INJECTION, SOLUTION INTRAMUSCULAR; INTRAVENOUS at 09:18

## 2021-09-04 RX ADMIN — BENZONATATE 200 MG: 100 CAPSULE ORAL at 01:05

## 2021-09-04 RX ADMIN — ALBUTEROL SULFATE 2 PUFF: 90 AEROSOL, METERED RESPIRATORY (INHALATION) at 00:09

## 2021-09-04 RX ADMIN — MORPHINE SULFATE 2 MG: 4 INJECTION INTRAVENOUS at 02:08

## 2021-09-04 RX ADMIN — ALBUTEROL SULFATE 2 PUFF: 90 AEROSOL, METERED RESPIRATORY (INHALATION) at 07:10

## 2021-09-04 RX ADMIN — FUROSEMIDE 20 MG: 10 INJECTION INTRAMUSCULAR; INTRAVENOUS at 13:20

## 2021-09-04 RX ADMIN — DOXYCYCLINE 100 MG: 100 TABLET, FILM COATED ORAL at 21:06

## 2021-09-04 RX ADMIN — ALBUTEROL SULFATE 2 PUFF: 90 AEROSOL, METERED RESPIRATORY (INHALATION) at 15:51

## 2021-09-04 RX ADMIN — HYDROCODONE POLISTIREX AND CHLORPHENIRAMINE POLISTIREX 5 ML: 10; 8 SUSPENSION, EXTENDED RELEASE ORAL at 01:06

## 2021-09-04 RX ADMIN — DILTIAZEM HYDROCHLORIDE 120 MG: 120 CAPSULE, COATED, EXTENDED RELEASE ORAL at 09:18

## 2021-09-04 RX ADMIN — ALBUTEROL SULFATE 2 PUFF: 90 AEROSOL, METERED RESPIRATORY (INHALATION) at 23:39

## 2021-09-04 RX ADMIN — DOCUSATE SODIUM 100 MG: 100 CAPSULE, LIQUID FILLED ORAL at 09:18

## 2021-09-04 RX ADMIN — ALBUTEROL SULFATE 2 PUFF: 90 AEROSOL, METERED RESPIRATORY (INHALATION) at 19:36

## 2021-09-04 RX ADMIN — Medication 3 ML: at 09:19

## 2021-09-04 RX ADMIN — ALBUTEROL SULFATE 2 PUFF: 90 AEROSOL, METERED RESPIRATORY (INHALATION) at 10:50

## 2021-09-04 RX ADMIN — OXYCODONE HYDROCHLORIDE AND ACETAMINOPHEN 500 MG: 500 TABLET ORAL at 09:18

## 2021-09-04 RX ADMIN — PANTOPRAZOLE SODIUM 40 MG: 40 TABLET, DELAYED RELEASE ORAL at 06:35

## 2021-09-04 RX ADMIN — BUDESONIDE AND FORMOTEROL FUMARATE DIHYDRATE 2 PUFF: 160; 4.5 AEROSOL RESPIRATORY (INHALATION) at 07:10

## 2021-09-04 RX ADMIN — DEXAMETHASONE SODIUM PHOSPHATE 6 MG: 4 INJECTION, SOLUTION INTRAMUSCULAR; INTRAVENOUS at 21:06

## 2021-09-04 RX ADMIN — FUROSEMIDE 20 MG: 10 INJECTION INTRAMUSCULAR; INTRAVENOUS at 21:06

## 2021-09-04 RX ADMIN — BENZONATATE 200 MG: 100 CAPSULE ORAL at 21:06

## 2021-09-04 RX ADMIN — DOXYCYCLINE 100 MG: 100 TABLET, FILM COATED ORAL at 09:18

## 2021-09-04 RX ADMIN — Medication 1000 UNITS: at 09:18

## 2021-09-04 RX ADMIN — INSULIN LISPRO 2 UNITS: 100 INJECTION, SOLUTION INTRAVENOUS; SUBCUTANEOUS at 18:43

## 2021-09-04 RX ADMIN — DOCUSATE SODIUM 100 MG: 100 CAPSULE, LIQUID FILLED ORAL at 21:05

## 2021-09-04 RX ADMIN — FLUOXETINE 40 MG: 20 CAPSULE ORAL at 09:18

## 2021-09-04 RX ADMIN — LEVOTHYROXINE SODIUM 112 MCG: 0.11 TABLET ORAL at 06:35

## 2021-09-04 NOTE — PLAN OF CARE
Pt resting will continue to monitor  Problem: Adult Inpatient Plan of Care  Goal: Absence of Hospital-Acquired Illness or Injury  Intervention: Identify and Manage Fall Risk  Recent Flowsheet Documentation  Taken 9/4/2021 1616 by Patrick Connor, RN  Safety Promotion/Fall Prevention:   safety round/check completed   nonskid shoes/slippers when out of bed   fall prevention program maintained   clutter free environment maintained  Taken 9/4/2021 1400 by Patrick Connor, RN  Safety Promotion/Fall Prevention:   safety round/check completed   nonskid shoes/slippers when out of bed   fall prevention program maintained   clutter free environment maintained  Taken 9/4/2021 1219 by Patrick Connor, RN  Safety Promotion/Fall Prevention:   safety round/check completed   nonskid shoes/slippers when out of bed   fall prevention program maintained   clutter free environment maintained  Taken 9/4/2021 1000 by Patrick Connor, RN  Safety Promotion/Fall Prevention:   safety round/check completed   nonskid shoes/slippers when out of bed   fall prevention program maintained   clutter free environment maintained  Taken 9/4/2021 0819 by Patrick Connor, RN  Safety Promotion/Fall Prevention:   safety round/check completed   nonskid shoes/slippers when out of bed   fall prevention program maintained   clutter free environment maintained     Problem: Fall Injury Risk  Goal: Absence of Fall and Fall-Related Injury  Intervention: Promote Injury-Free Environment  Recent Flowsheet Documentation  Taken 9/4/2021 1616 by Patrick Connor, RN  Safety Promotion/Fall Prevention:   safety round/check completed   nonskid shoes/slippers when out of bed   fall prevention program maintained   clutter free environment maintained  Taken 9/4/2021 1400 by Patrick Connor, RN  Safety Promotion/Fall Prevention:   safety round/check completed   nonskid shoes/slippers when out of bed   fall prevention program maintained   clutter free environment  maintained  Taken 9/4/2021 1219 by Patrick Connor, RN  Safety Promotion/Fall Prevention:   safety round/check completed   nonskid shoes/slippers when out of bed   fall prevention program maintained   clutter free environment maintained  Taken 9/4/2021 1000 by Patrick Connor, RN  Safety Promotion/Fall Prevention:   safety round/check completed   nonskid shoes/slippers when out of bed   fall prevention program maintained   clutter free environment maintained  Taken 9/4/2021 0819 by Patrick Connor, RN  Safety Promotion/Fall Prevention:   safety round/check completed   nonskid shoes/slippers when out of bed   fall prevention program maintained   clutter free environment maintained   Goal Outcome Evaluation:

## 2021-09-04 NOTE — PROGRESS NOTES
"    Reason for follow-up: Atrial fibrillation     Patient Care Team:  Jaya Harper MD as PCP - General  Grief, Jaya RODRIGUEZ MD as PCP - Family Medicine    Subjective .   Ms. Montiel is feeling better this morning she was short of breath last night.     ROS    Clarithromycin    Scheduled Meds:albuterol sulfate HFA, 2 puff, Inhalation, Q4H - RT  ascorbic acid, 500 mg, Oral, Daily  budesonide-formoterol, 2 puff, Inhalation, BID - RT  cefTRIAXone, 1 g, Intravenous, Q24H  cholecalciferol, 1,000 Units, Oral, Daily  dexamethasone, 6 mg, Intravenous, Q12H  dilTIAZem CD, 120 mg, Oral, Q24H  docusate sodium, 100 mg, Oral, BID  doxycycline, 100 mg, Oral, Q12H  FLUoxetine, 40 mg, Oral, Daily  furosemide, 20 mg, Intravenous, Q12H  insulin lispro, 0-9 Units, Subcutaneous, TID AC  levothyroxine, 112 mcg, Oral, Q AM  montelukast, 10 mg, Oral, Nightly  pantoprazole, 40 mg, Oral, QAM  polyethylene glycol, 17 g, Oral, Daily  rivaroxaban, 20 mg, Oral, Daily With Dinner  sodium chloride, 3 mL, Intravenous, Q12H  traZODone, 50 mg, Oral, Nightly  zinc sulfate, 220 mg, Oral, Daily      Continuous Infusions:   PRN Meds:.benzonatate  •  dextrose  •  dextrose  •  glucagon (human recombinant)  •  HYDROcod Polst-CPM Polst ER  •  insulin lispro **AND** insulin lispro  •  LORazepam  •  melatonin  •  metoprolol tartrate  •  Morphine  •  ondansetron  •  sodium chloride  •  [COMPLETED] Insert peripheral IV **AND** sodium chloride  •  sodium chloride      VITAL SIGNS  Vitals:    09/04/21 0009 09/04/21 0139 09/04/21 0530 09/04/21 0710   BP:  147/73 129/67    Pulse: 94 82 68 63   Resp: 22 22 25 24   Temp:  97.6 °F (36.4 °C) 96.6 °F (35.9 °C)    TempSrc:  Axillary Axillary    SpO2: 91% 91% 97% 92%   Weight:   121 kg (266 lb 8.6 oz)    Height:           Flowsheet Rows      First Filed Value   Admission Height  167.6 cm (66\") Documented at 08/29/2021 1152   Admission Weight  118 kg (259 lb 11.2 oz) Documented at 08/29/2021 1152           TELEMETRY: " Sinus rhythm    Physical Exam  VITALS REVIEWED    Physical exam was not performed due to Covid positive status.  Patient was not in acute distress.      LAB RESULTS (LAST 7 DAYS)    CBC  Results from last 7 days   Lab Units 09/04/21 0312 09/03/21 0241 09/02/21 0227 09/01/21 0327 08/31/21 0420 08/30/21  0839 08/29/21  1215   WBC 10*3/mm3 15.10* 13.90* 11.80* 11.50* 9.00 9.30 9.00   RBC 10*6/mm3 3.81 3.84 3.97 4.16 4.30 4.19 4.47   HEMOGLOBIN g/dL 12.1 11.8* 12.6 13.4 13.7 13.5 14.3   HEMATOCRIT % 35.6 34.9 37.2 38.9 40.4 39.3 41.1   MCV fL 93.2 90.9 93.6 93.6 93.9 93.8 91.9   PLATELETS 10*3/mm3 345 324 272 218 190 175 196       BMP  Results from last 7 days   Lab Units 09/04/21 0312 09/03/21 0241 09/02/21 0227 09/01/21 0327 08/31/21 0420 08/30/21  0839 08/29/21  1215   SODIUM mmol/L 132* 134* 133* 133* 134* 135* 131*   POTASSIUM mmol/L 4.2 3.8 4.1 4.3 4.1 3.9 3.7   CHLORIDE mmol/L 96* 98 97* 99 99 97* 94*   CO2 mmol/L 26.0 24.0 24.0 23.0 22.0 24.0 22.0   BUN mg/dL 31* 32* 38* 36* 27* 23* 25*   CREATININE mg/dL 0.97 0.96 1.07* 1.09* 1.13* 1.00 1.32*  1.30*   GLUCOSE mg/dL 139* 145* 153* 148* 152* 99 111*       CMP   Results from last 7 days   Lab Units 09/04/21 0312 09/03/21 0241 09/02/21 0227 09/01/21 0327 08/31/21  0420 08/30/21  0839 08/29/21  1215   SODIUM mmol/L 132* 134* 133* 133* 134* 135* 131*   POTASSIUM mmol/L 4.2 3.8 4.1 4.3 4.1 3.9 3.7   CHLORIDE mmol/L 96* 98 97* 99 99 97* 94*   CO2 mmol/L 26.0 24.0 24.0 23.0 22.0 24.0 22.0   BUN mg/dL 31* 32* 38* 36* 27* 23* 25*   CREATININE mg/dL 0.97 0.96 1.07* 1.09* 1.13* 1.00 1.32*  1.30*   GLUCOSE mg/dL 139* 145* 153* 148* 152* 99 111*   ALBUMIN g/dL 3.10* 3.10* 3.10* 3.20* 3.40* 3.70 4.00  3.90   BILIRUBIN mg/dL 0.3 0.3 0.3 0.3 0.3 0.3 0.4  0.4   ALK PHOS U/L 71 62 63 66 72 75 80  80   AST (SGOT) U/L 22 21 23 30 38* 39* 43*  40*   ALT (SGPT) U/L 16 17 18 21 23 26 27  28         BNP        TROPONIN        CoAg        Creatinine  Clearance  Estimated Creatinine Clearance: 83.8 mL/min (by C-G formula based on SCr of 0.97 mg/dL).    ABG  Results from last 7 days   Lab Units 09/01/21  0855 08/29/21  1236   PH, ARTERIAL pH units 7.421 7.449   PCO2, ARTERIAL mm Hg 31.9* 30.0*   PO2 ART mm Hg 50.4* 65.7*   O2 SATURATION ART % 86.5* 93.9*   BASE EXCESS ART mmol/L -2.7* -2.1*           Assessment/Plan       Pneumonia due to COVID-19 virus    Hypoxia      Ms. Montiel is a 56-year-old female patient who was admitted for pneumonia due to COVID-19 infection.  On admission she was in sinus rhythm but developed new onset atrial fibrillation with rapid ventricular rate.  She was started on rate control therapy with Cardizem and as needed metoprolol.  Also placed on Xarelto for stroke prevention.  Patient spontaneously converted back to sinus rhythm and she is in sinus rhythm this morning.  Continue p.o. Cardizem and Xarelto for now.    I discussed the patients findings and my recommendations with patient and agrees with the outlined plan     Frederic Garcia MD  09/04/21  10:36 EDT

## 2021-09-04 NOTE — PROGRESS NOTES
"PULMONARY CRITICAL CARE Progress  NOTE      PATIENT IDENTIFICATION:  Name: Danita Montiel  MRN: [id number]  :  1965     Age: 56 y.o.  Sex: female    DATE OF Note:  2021   Referring Physician: Korin Lopez MD                  Subjective:   Still on PF 40/100, no new issues,  no chest or abd pain, no bowel or bladder issues    Objective:  tMax 24 hrs: Temp (24hrs), Av.8 °F (36.6 °C), Min:96.6 °F (35.9 °C), Max:98.5 °F (36.9 °C)      Vitals Ranges:   Temp:  [96.6 °F (35.9 °C)-98.5 °F (36.9 °C)] 96.6 °F (35.9 °C)  Heart Rate:  [60-94] 63  Resp:  [18-25] 24  BP: (107-147)/(47-97) 129/67    Intake and Output Last 3 Shifts:   I/O last 3 completed shifts:  In: 600 [P.O.:600]  Out: 3750 [Urine:3750]    Exam:  /67   Pulse 63   Temp 96.6 °F (35.9 °C) (Axillary)   Resp 24   Ht 167.6 cm (66\")   Wt 121 kg (266 lb 8.6 oz)   LMP  (LMP Unknown)   SpO2 92%   BMI 43.02 kg/m²     General Appearance: Alert    HEENT:  Normocephalic, without obvious abnormality, Conjunctiva/corneas clear,.  Normal external ear canals, Nares normal, no drainage     Neck:  Supple, symmetrical, trachea midline. No JVD.  Lungs /Chest wall:   Bilateral basal rhonchi, respirations unlabored symmetrical wall movement.     Heart:  Regular rate and rhythm, systolic murmur PMI left sternal border  Abdomen: Soft, non-tender, no masses, no organomegaly.    Extremities: Trace edema no clubbing or Cyanosis        Medications:    Current Facility-Administered Medications:   •  albuterol sulfate HFA (PROVENTIL HFA;VENTOLIN HFA;PROAIR HFA) inhaler 2 puff, 2 puff, Inhalation, Q4H - RT, Korin Lopez MD, 2 puff at 21 0710  •  ascorbic acid (VITAMIN C) tablet 500 mg, 500 mg, Oral, Daily, Korin Lopez MD, 500 mg at 21 0918  •  benzonatate (TESSALON) capsule 200 mg, 200 mg, Oral, TID PRN, Korin Lopez MD, 200 mg at 21 0105  •  budesonide-formoterol (SYMBICORT) 160-4.5 MCG/ACT inhaler 2 puff, 2 puff, Inhalation, BID - RT, " Mathieu Mars MD, 2 puff at 09/04/21 0710  •  cefTRIAXone (ROCEPHIN) 1 g in sodium chloride 0.9 % 100 mL IVPB, 1 g, Intravenous, Q24H, Korin Lopez MD, Last Rate: 200 mL/hr at 09/03/21 1419, 1 g at 09/03/21 1419  •  cholecalciferol (VITAMIN D3) tablet 1,000 Units, 1,000 Units, Oral, Daily, Korin Lopez MD, 1,000 Units at 09/04/21 0918  •  dexamethasone (DECADRON) injection 6 mg, 6 mg, Intravenous, Q12H, Jessica Donis APRN, 6 mg at 09/04/21 0918  •  dextrose (D50W) 25 g/ 50mL Intravenous Solution 25 g, 25 g, Intravenous, Q15 Min PRN, Jessica Donis APRN  •  dextrose (GLUTOSE) oral gel 15 g, 15 g, Oral, Q15 Min PRN, Jessica Donis APRN  •  dilTIAZem CD (CARDIZEM CD) 24 hr capsule 120 mg, 120 mg, Oral, Q24H, Brennen Tobar MD, 120 mg at 09/04/21 0918  •  docusate sodium (COLACE) capsule 100 mg, 100 mg, Oral, BID, Shana Diaz MD, 100 mg at 09/04/21 0918  •  doxycycline (ADOXA) tablet 100 mg, 100 mg, Oral, Q12H, Korin Lopez MD, 100 mg at 09/04/21 0918  •  FLUoxetine (PROzac) capsule 40 mg, 40 mg, Oral, Daily, Korin Lopez MD, 40 mg at 09/04/21 0918  •  furosemide (LASIX) injection 20 mg, 20 mg, Intravenous, Q12H, Jessica Donis APRN, 20 mg at 09/03/21 2135  •  glucagon (human recombinant) (GLUCAGEN DIAGNOSTIC) 1 mg in sterile water (preservative free) 1 mL injection, 1 mg, Subcutaneous, Q15 Min PRN, Jessica Donis APRN  •  HYDROcod Polst-CPM Polst ER (TUSSIONEX PENNKINETIC) 10-8 MG/5ML ER suspension 5 mL, 5 mL, Oral, Q12H PRN, Mathieu Mars MD, 5 mL at 09/04/21 0106  •  insulin lispro (ADMELOG) injection 0-9 Units, 0-9 Units, Subcutaneous, TID AC, 2 Units at 09/03/21 1138 **AND** insulin lispro (ADMELOG) injection 0-9 Units, 0-9 Units, Subcutaneous, PRN, Jessica Donis, TOMMY  •  levothyroxine (SYNTHROID, LEVOTHROID) tablet 112 mcg, 112 mcg, Oral, Q AM, Korin Lopez MD, 112 mcg at 09/04/21 0635  •  LORazepam (ATIVAN) tablet 1 mg, 1 mg, Oral, Q6H PRN, Korin Lopez MD, 1 mg at 09/03/21  2310  •  melatonin tablet 5 mg, 5 mg, Oral, Nightly PRN, Korin Lopez MD, 5 mg at 09/01/21 2031  •  metoprolol tartrate (LOPRESSOR) injection 5 mg, 5 mg, Intravenous, Q4H PRN, Korin Lopez MD, 5 mg at 09/01/21 0956  •  montelukast (SINGULAIR) tablet 10 mg, 10 mg, Oral, Nightly, Korin Lopez MD, 10 mg at 09/03/21 2135  •  Morphine sulfate (PF) injection 2 mg, 2 mg, Intravenous, Q2H PRN, Shana Diaz MD, 2 mg at 09/04/21 0208  •  ondansetron (ZOFRAN) tablet 4 mg, 4 mg, Oral, Q6H PRN, Korin Lopez MD  •  pantoprazole (PROTONIX) EC tablet 40 mg, 40 mg, Oral, QAM, Korin Lopez MD, 40 mg at 09/04/21 0635  •  polyethylene glycol (MIRALAX) packet 17 g, 17 g, Oral, Daily, Shana Diaz MD, 17 g at 09/04/21 0918  •  rivaroxaban (XARELTO) tablet 20 mg, 20 mg, Oral, Daily With Dinner, Korin Lopez MD, 20 mg at 09/03/21 1838  •  sodium chloride 0.9 % flush 10 mL, 10 mL, Intravenous, PRN, Korin Lopez MD  •  [COMPLETED] Insert peripheral IV, , , Once **AND** sodium chloride 0.9 % flush 10 mL, 10 mL, Intravenous, PRN, Korin Lopez MD  •  sodium chloride 0.9 % flush 3 mL, 3 mL, Intravenous, Q12H, Korin Lopez MD, 3 mL at 09/04/21 0919  •  sodium chloride 0.9 % flush 3-10 mL, 3-10 mL, Intravenous, PRN, Korin Lopez MD  •  traZODone (DESYREL) tablet 50 mg, 50 mg, Oral, Nightly, Korin Lopez MD, 50 mg at 09/03/21 2135  •  zinc sulfate (ZINCATE) capsule 220 mg, 220 mg, Oral, Daily, Korin Lopez MD, 220 mg at 09/04/21 0918    Data Review:  All labs (24hrs):   Recent Results (from the past 24 hour(s))   POC Glucose Once    Collection Time: 09/03/21 11:02 AM    Specimen: Blood   Result Value Ref Range    Glucose 151 (H) 70 - 105 mg/dL   POC Glucose Once    Collection Time: 09/03/21  4:42 PM    Specimen: Blood   Result Value Ref Range    Glucose 126 (H) 70 - 105 mg/dL   POC Glucose Once    Collection Time: 09/03/21  9:03 PM    Specimen: Blood   Result Value Ref Range    Glucose 153 (H) 70 - 105 mg/dL   Comprehensive Metabolic Panel     Collection Time: 09/04/21  3:12 AM    Specimen: Blood   Result Value Ref Range    Glucose 139 (H) 65 - 99 mg/dL    BUN 31 (H) 6 - 20 mg/dL    Creatinine 0.97 0.57 - 1.00 mg/dL    Sodium 132 (L) 136 - 145 mmol/L    Potassium 4.2 3.5 - 5.2 mmol/L    Chloride 96 (L) 98 - 107 mmol/L    CO2 26.0 22.0 - 29.0 mmol/L    Calcium 8.2 (L) 8.6 - 10.5 mg/dL    Total Protein 6.0 6.0 - 8.5 g/dL    Albumin 3.10 (L) 3.50 - 5.20 g/dL    ALT (SGPT) 16 1 - 33 U/L    AST (SGOT) 22 1 - 32 U/L    Alkaline Phosphatase 71 39 - 117 U/L    Total Bilirubin 0.3 0.0 - 1.2 mg/dL    eGFR Non African Amer 59 (L) >60 mL/min/1.73    Globulin 2.9 gm/dL    A/G Ratio 1.1 g/dL    BUN/Creatinine Ratio 32.0 (H) 7.0 - 25.0    Anion Gap 10.0 5.0 - 15.0 mmol/L   C-reactive Protein    Collection Time: 09/04/21  3:12 AM    Specimen: Blood   Result Value Ref Range    C-Reactive Protein 1.53 (H) 0.00 - 0.50 mg/dL   D-dimer, Quantitative    Collection Time: 09/04/21  3:12 AM    Specimen: Blood   Result Value Ref Range    D-Dimer, Quantitative 0.83 (H) 0.00 - 0.59 mg/L (FEU)   CBC Auto Differential    Collection Time: 09/04/21  3:12 AM    Specimen: Blood   Result Value Ref Range    WBC 15.10 (H) 3.40 - 10.80 10*3/mm3    RBC 3.81 3.77 - 5.28 10*6/mm3    Hemoglobin 12.1 12.0 - 15.9 g/dL    Hematocrit 35.6 34.0 - 46.6 %    MCV 93.2 79.0 - 97.0 fL    MCH 31.7 26.6 - 33.0 pg    MCHC 34.0 31.5 - 35.7 g/dL    RDW 13.7 12.3 - 15.4 %    RDW-SD 43.8 37.0 - 54.0 fl    MPV 8.6 6.0 - 12.0 fL    Platelets 345 140 - 450 10*3/mm3   Bilirubin, Direct    Collection Time: 09/04/21  3:12 AM    Specimen: Blood   Result Value Ref Range    Bilirubin, Direct <0.2 0.0 - 0.3 mg/dL   Scan Slide    Collection Time: 09/04/21  3:12 AM    Specimen: Blood   Result Value Ref Range    Scan Slide     Manual Differential    Collection Time: 09/04/21  3:12 AM    Specimen: Blood   Result Value Ref Range    Neutrophil % 80.0 (H) 42.7 - 76.0 %    Lymphocyte % 10.0 (L) 19.6 - 45.3 %    Monocyte % 7.0  5.0 - 12.0 %    Bands %  3.0 0.0 - 5.0 %    Neutrophils Absolute 12.53 (H) 1.70 - 7.00 10*3/mm3    Lymphocytes Absolute 1.51 0.70 - 3.10 10*3/mm3    Monocytes Absolute 1.06 (H) 0.10 - 0.90 10*3/mm3    RBC Morphology Normal Normal    WBC Morphology Normal Normal    Platelet Estimate Adequate Normal   POC Glucose Once    Collection Time: 09/04/21  7:07 AM    Specimen: Blood   Result Value Ref Range    Glucose 130 (H) 70 - 105 mg/dL        Imaging:  XR Abdomen KUB  Narrative: DATE OF EXAM:  9/2/2021 11:04 PM     PROCEDURE:  XR ABDOMEN KUB-     INDICATIONS:  RUQ pain; R06.00-Dyspnea, unspecified; U07.1-COVID-19; U07.1-COVID-19;  J12.82-Pneumonia due to Coronavirus disease 2019; R09.02-Hypoxemia     COMPARISON:  Portable chest 09/01/2021     TECHNIQUE:   Single radiographic view of the abdomen was obtained.     FINDINGS:  There are right upper quadrant surgical clips. There is scattered gas  and stool in the colon. There is no gaseous distention of small bowel.  There is no gross soft tissue mass or abnormal calcification. There are  bilateral lower lung opacities greater on left than right, grossly  similar to the prior chest x-ray. There are right upper quadrant  surgical clips.     Impression: 1. Unremarkable bowel gas pattern.      Electronically Signed By-Breanna Sanchez MD On:9/2/2021 11:29 PM  This report was finalized on [id number] by  Breanna Sanchez MD.       ASSESSMENT:  Acute hypoxic respiratory failure on oxygen supplement  COVID-19  Pneumonia   NEELIMA  Asthma  Hypertension  Hypothyroidism  GERD     PLAN:  Continue to decrease O2 as tolerated to keep sats > 90%  Boost TID  Dexa/Vit C/Zinc  Tips and advised to improve his nutrition  Bronchodilator  Inhaled corticosteroids  Electrolytes/ glycemic control  DVT and GI prophylaxis    Discussed with Dr Jerad Parks, APRN   9/4/2021  10:50 EDT         I personally have examined  and interviewed the patient. I have reviewed the history, data, problems,  assessment and plan with our NP.  Critical care time in direct medical management (   ) minutes  Electronically signed by Mathieu Mars MD, D,ABS, 09/04/21, 10:19 PM EDT.

## 2021-09-04 NOTE — PROGRESS NOTES
LOS: 6 days   Patient Care Team:  Jaya Harper MD as PCP - General  GriefJaya MD as PCP - Family Medicine    Subjective:  Better//conversational dyspnea      Objective:   Constipated//afebrile      Review of Systems:   Review of Systems   Constitutional: Positive for activity change.   Respiratory: Positive for shortness of breath.    Cardiovascular: Negative.    Neurological: Positive for weakness.           Vital Signs  Temp:  [96.6 °F (35.9 °C)-98.5 °F (36.9 °C)] 96.6 °F (35.9 °C)  Heart Rate:  [60-94] 65  Resp:  [18-25] 20  BP: (107-147)/(47-97) 129/67    Physical Exam:  Physical Exam  Vitals and nursing note reviewed.   Constitutional:       Appearance: Normal appearance.   Cardiovascular:      Pulses: Normal pulses.   Pulmonary:      Effort: Respiratory distress present.      Breath sounds: Wheezing and rales present.   Skin:     General: Skin is warm.   Neurological:      General: No focal deficit present.      Mental Status: She is alert.          Radiology:  XR Chest 1 View    Result Date: 9/1/2021  1.Patchy bilateral airspace opacities appear slightly worse on the left, suggesting pneumonia. 2.Small left pleural effusion.  Electronically Signed By-Alex Adame MD On:9/1/2021 7:59 AM This report was finalized on [id number] by  Alex Adame MD.    XR Chest 1 View    Result Date: 8/31/2021  Patchy bilateral airspace opacities appear slightly worse, suggesting pneumonia.  Electronically Signed By-Alex Adame MD On:8/31/2021 7:49 AM This report was finalized on [id number] by  Alex Adame MD.    XR Chest 1 View    Result Date: 8/29/2021  Abnormal appearance of the lungs which can be seen with Covid 19 pneumonia.  Electronically Signed By-Hubert Campbell MD On:8/29/2021 1:08 PM This report was finalized on [id number] by  Hubert Campbell MD.    XR Abdomen KUB    Result Date: 9/2/2021  1. Unremarkable bowel gas pattern.  Electronically Signed By-Breanna Sanchez MD On:9/2/2021  11:29 PM This report was finalized on [id number] by  Breanna Sanchez MD.         Results Review:     I reviewed the patient's new clinical results.  I reviewed the patient's new imaging results and agree with the interpretation.    Medication Review:   Scheduled Meds:albuterol sulfate HFA, 2 puff, Inhalation, Q4H - RT  ascorbic acid, 500 mg, Oral, Daily  budesonide-formoterol, 2 puff, Inhalation, BID - RT  cefTRIAXone, 1 g, Intravenous, Q24H  cholecalciferol, 1,000 Units, Oral, Daily  dexamethasone, 6 mg, Intravenous, Q12H  dilTIAZem CD, 120 mg, Oral, Q24H  docusate sodium, 100 mg, Oral, BID  doxycycline, 100 mg, Oral, Q12H  FLUoxetine, 40 mg, Oral, Daily  furosemide, 20 mg, Intravenous, Q12H  insulin lispro, 0-9 Units, Subcutaneous, TID AC  levothyroxine, 112 mcg, Oral, Q AM  montelukast, 10 mg, Oral, Nightly  pantoprazole, 40 mg, Oral, QAM  polyethylene glycol, 17 g, Oral, Daily  rivaroxaban, 20 mg, Oral, Daily With Dinner  sodium chloride, 3 mL, Intravenous, Q12H  traZODone, 50 mg, Oral, Nightly  zinc sulfate, 220 mg, Oral, Daily      Continuous Infusions:   PRN Meds:.benzonatate  •  dextrose  •  dextrose  •  glucagon (human recombinant)  •  HYDROcod Polst-CPM Polst ER  •  insulin lispro **AND** insulin lispro  •  LORazepam  •  melatonin  •  metoprolol tartrate  •  Morphine  •  ondansetron  •  sodium chloride  •  [COMPLETED] Insert peripheral IV **AND** sodium chloride  •  sodium chloride    Labs:    CBC    Results from last 7 days   Lab Units 09/04/21  0312 09/03/21  0241 09/02/21  0227 09/01/21  0327 08/31/21  0420 08/30/21  0839 08/29/21  1215   WBC 10*3/mm3 15.10* 13.90* 11.80* 11.50* 9.00 9.30 9.00   HEMOGLOBIN g/dL 12.1 11.8* 12.6 13.4 13.7 13.5 14.3   PLATELETS 10*3/mm3 345 324 272 218 190 175 196     BMP   Results from last 7 days   Lab Units 09/04/21  0312 09/03/21  0241 09/02/21  0227 09/01/21  0327 08/31/21  0420 08/30/21  0839 08/29/21  1215   SODIUM mmol/L 132* 134* 133* 133* 134* 135* 131*    POTASSIUM mmol/L 4.2 3.8 4.1 4.3 4.1 3.9 3.7   CHLORIDE mmol/L 96* 98 97* 99 99 97* 94*   CO2 mmol/L 26.0 24.0 24.0 23.0 22.0 24.0 22.0   BUN mg/dL 31* 32* 38* 36* 27* 23* 25*   CREATININE mg/dL 0.97 0.96 1.07* 1.09* 1.13* 1.00 1.32*  1.30*   GLUCOSE mg/dL 139* 145* 153* 148* 152* 99 111*     Cr Clearance Estimated Creatinine Clearance: 83.8 mL/min (by C-G formula based on SCr of 0.97 mg/dL).  Coag     HbA1C No results found for: HGBA1C  Blood Glucose   Glucose   Date/Time Value Ref Range Status   09/04/2021 0707 130 (H) 70 - 105 mg/dL Final     Comment:     Serial Number: [id number]:  [id number]   09/03/2021 2103 153 (H) 70 - 105 mg/dL Final     Comment:     Serial Number: [id number]:  [id number]   09/03/2021 1642 126 (H) 70 - 105 mg/dL Final     Comment:     Serial Number: [id number]:  [id number]   09/03/2021 1102 151 (H) 70 - 105 mg/dL Final     Comment:     Serial Number: [id number]:  [id number]   09/03/2021 0714 140 (H) 70 - 105 mg/dL Final     Comment:     Serial Number: [id number]:  [id number]   09/02/2021 2033 164 (H) 70 - 105 mg/dL Final     Comment:     Serial Number: [id number]:  [id number]   09/02/2021 1638 155 (H) 70 - 105 mg/dL Final     Comment:     Serial Number: [id number]:  [id number]   09/02/2021 1112 142 (H) 70 - 105 mg/dL Final     Comment:     Serial Number: [id number]:  [id number]     Infection   Results from last 7 days   Lab Units 08/30/21  1549 08/29/21  1227 08/29/21  1215   BLOODCX  No growth at 4 days  No growth at 4 days No growth at 5 days No growth at 5 days     CMP   Results from last 7 days   Lab Units 09/04/21  0312 09/03/21  0241 09/02/21  0227 09/01/21  0327 08/31/21  0420 08/30/21  0839 08/29/21  1215   SODIUM mmol/L 132* 134* 133* 133* 134* 135* 131*   POTASSIUM mmol/L 4.2 3.8 4.1 4.3 4.1 3.9 3.7   CHLORIDE mmol/L 96* 98 97* 99 99 97* 94*   CO2 mmol/L 26.0 24.0 24.0 23.0 22.0 24.0 22.0   BUN mg/dL 31* 32* 38* 36* 27* 23* 25*    CREATININE mg/dL 0.97 0.96 1.07* 1.09* 1.13* 1.00 1.32*  1.30*   GLUCOSE mg/dL 139* 145* 153* 148* 152* 99 111*   ALBUMIN g/dL 3.10* 3.10* 3.10* 3.20* 3.40* 3.70 4.00  3.90   BILIRUBIN mg/dL 0.3 0.3 0.3 0.3 0.3 0.3 0.4  0.4   ALK PHOS U/L 71 62 63 66 72 75 80  80   AST (SGOT) U/L 22 21 23 30 38* 39* 43*  40*   ALT (SGPT) U/L 16 17 18 21 23 26 27  28     UA      Radiology(recent) XR Abdomen KUB    Result Date: 9/2/2021  1. Unremarkable bowel gas pattern.  Electronically Signed By-Breanna Sanchez MD On:9/2/2021 11:29 PM This report was finalized on [id number] by  Breanna Sanchez MD.     Assessment:  PNA secondary to COVID-19  Acute hypoxic respiratory failure  AFIB/RVR  OAT  NEELIMA  Hypoventilation apnea syndrome with NEELIMA  Obesity hypoventilation syndrome  Acquired hypothyroidism  GERD without esophagitis  CKD IIIa  Htn associated with CKD IIIa      Plan:  Continue aggressive pulmonary toilette        Hubert Colbert MD  09/04/21  11:05 EDT

## 2021-09-05 LAB
ALBUMIN SERPL-MCNC: 2.9 G/DL (ref 3.5–5.2)
ALBUMIN/GLOB SERPL: 1 G/DL
ALP SERPL-CCNC: 67 U/L (ref 39–117)
ALT SERPL W P-5'-P-CCNC: 15 U/L (ref 1–33)
ANION GAP SERPL CALCULATED.3IONS-SCNC: 11 MMOL/L (ref 5–15)
AST SERPL-CCNC: 19 U/L (ref 1–32)
BILIRUB CONJ SERPL-MCNC: <0.2 MG/DL (ref 0–0.3)
BILIRUB SERPL-MCNC: 0.3 MG/DL (ref 0–1.2)
BUN SERPL-MCNC: 34 MG/DL (ref 6–20)
BUN/CREAT SERPL: 34.7 (ref 7–25)
CALCIUM SPEC-SCNC: 8.1 MG/DL (ref 8.6–10.5)
CHLORIDE SERPL-SCNC: 94 MMOL/L (ref 98–107)
CO2 SERPL-SCNC: 26 MMOL/L (ref 22–29)
CREAT SERPL-MCNC: 0.98 MG/DL (ref 0.57–1)
CRP SERPL-MCNC: 1.18 MG/DL (ref 0–0.5)
D DIMER PPP FEU-MCNC: 0.96 MG/L (FEU) (ref 0–0.59)
DEPRECATED RDW RBC AUTO: 45.9 FL (ref 37–54)
ERYTHROCYTE [DISTWIDTH] IN BLOOD BY AUTOMATED COUNT: 14 % (ref 12.3–15.4)
GFR SERPL CREATININE-BSD FRML MDRD: 58 ML/MIN/1.73
GIANT PLATELETS: ABNORMAL
GLOBULIN UR ELPH-MCNC: 2.8 GM/DL
GLUCOSE BLDC GLUCOMTR-MCNC: 123 MG/DL (ref 70–105)
GLUCOSE BLDC GLUCOMTR-MCNC: 128 MG/DL (ref 70–105)
GLUCOSE BLDC GLUCOMTR-MCNC: 139 MG/DL (ref 70–105)
GLUCOSE BLDC GLUCOMTR-MCNC: 156 MG/DL (ref 70–105)
GLUCOSE SERPL-MCNC: 145 MG/DL (ref 65–99)
HCT VFR BLD AUTO: 35.9 % (ref 34–46.6)
HGB BLD-MCNC: 11.9 G/DL (ref 12–15.9)
LARGE PLATELETS: ABNORMAL
LYMPHOCYTES # BLD MANUAL: 0.61 10*3/MM3 (ref 0.7–3.1)
LYMPHOCYTES NFR BLD MANUAL: 4 % (ref 19.6–45.3)
LYMPHOCYTES NFR BLD MANUAL: 4 % (ref 5–12)
MCH RBC QN AUTO: 31 PG (ref 26.6–33)
MCHC RBC AUTO-ENTMCNC: 33.2 G/DL (ref 31.5–35.7)
MCV RBC AUTO: 93.5 FL (ref 79–97)
METAMYELOCYTES NFR BLD MANUAL: 2 % (ref 0–0)
MONOCYTES # BLD AUTO: 0.61 10*3/MM3 (ref 0.1–0.9)
NEUTROPHILS # BLD AUTO: 13.68 10*3/MM3 (ref 1.7–7)
NEUTROPHILS NFR BLD MANUAL: 79 % (ref 42.7–76)
NEUTS BAND NFR BLD MANUAL: 11 % (ref 0–5)
NRBC SPEC MANUAL: 1 /100 WBC (ref 0–0.2)
PLATELET # BLD AUTO: 365 10*3/MM3 (ref 140–450)
PMV BLD AUTO: 8.6 FL (ref 6–12)
POTASSIUM SERPL-SCNC: 4.3 MMOL/L (ref 3.5–5.2)
PROT SERPL-MCNC: 5.7 G/DL (ref 6–8.5)
RBC # BLD AUTO: 3.84 10*6/MM3 (ref 3.77–5.28)
RBC MORPH BLD: NORMAL
SCAN SLIDE: NORMAL
SMALL PLATELETS BLD QL SMEAR: ADEQUATE
SODIUM SERPL-SCNC: 131 MMOL/L (ref 136–145)
WBC # BLD AUTO: 15.2 10*3/MM3 (ref 3.4–10.8)
WBC MORPH BLD: NORMAL

## 2021-09-05 PROCEDURE — 82962 GLUCOSE BLOOD TEST: CPT

## 2021-09-05 PROCEDURE — 25010000002 DEXAMETHASONE PER 1 MG: Performed by: NURSE PRACTITIONER

## 2021-09-05 PROCEDURE — 82248 BILIRUBIN DIRECT: CPT | Performed by: INTERNAL MEDICINE

## 2021-09-05 PROCEDURE — 80053 COMPREHEN METABOLIC PANEL: CPT | Performed by: INTERNAL MEDICINE

## 2021-09-05 PROCEDURE — 25010000002 CEFTRIAXONE PER 250 MG: Performed by: FAMILY MEDICINE

## 2021-09-05 PROCEDURE — 94799 UNLISTED PULMONARY SVC/PX: CPT

## 2021-09-05 PROCEDURE — 63710000001 INSULIN LISPRO (HUMAN) PER 5 UNITS: Performed by: NURSE PRACTITIONER

## 2021-09-05 PROCEDURE — 85025 COMPLETE CBC W/AUTO DIFF WBC: CPT | Performed by: INTERNAL MEDICINE

## 2021-09-05 PROCEDURE — 99232 SBSQ HOSP IP/OBS MODERATE 35: CPT | Performed by: INTERNAL MEDICINE

## 2021-09-05 PROCEDURE — 85379 FIBRIN DEGRADATION QUANT: CPT | Performed by: INTERNAL MEDICINE

## 2021-09-05 PROCEDURE — 86140 C-REACTIVE PROTEIN: CPT | Performed by: INTERNAL MEDICINE

## 2021-09-05 PROCEDURE — 85007 BL SMEAR W/DIFF WBC COUNT: CPT | Performed by: INTERNAL MEDICINE

## 2021-09-05 PROCEDURE — 25010000002 FUROSEMIDE PER 20 MG: Performed by: NURSE PRACTITIONER

## 2021-09-05 PROCEDURE — 63710000001 ONDANSETRON PER 8 MG: Performed by: INTERNAL MEDICINE

## 2021-09-05 RX ADMIN — OXYCODONE HYDROCHLORIDE AND ACETAMINOPHEN 500 MG: 500 TABLET ORAL at 08:59

## 2021-09-05 RX ADMIN — HYDROCODONE POLISTIREX AND CHLORPHENIRAMINE POLISTIREX 5 ML: 10; 8 SUSPENSION, EXTENDED RELEASE ORAL at 21:17

## 2021-09-05 RX ADMIN — ALBUTEROL SULFATE 2 PUFF: 90 AEROSOL, METERED RESPIRATORY (INHALATION) at 11:04

## 2021-09-05 RX ADMIN — RIVAROXABAN 20 MG: 20 TABLET, FILM COATED ORAL at 17:16

## 2021-09-05 RX ADMIN — DOCUSATE SODIUM 100 MG: 100 CAPSULE, LIQUID FILLED ORAL at 21:17

## 2021-09-05 RX ADMIN — HYDROCODONE POLISTIREX AND CHLORPHENIRAMINE POLISTIREX 5 ML: 10; 8 SUSPENSION, EXTENDED RELEASE ORAL at 07:35

## 2021-09-05 RX ADMIN — MONTELUKAST 10 MG: 10 TABLET, FILM COATED ORAL at 21:17

## 2021-09-05 RX ADMIN — DOXYCYCLINE 100 MG: 100 TABLET, FILM COATED ORAL at 08:59

## 2021-09-05 RX ADMIN — DILTIAZEM HYDROCHLORIDE 120 MG: 120 CAPSULE, COATED, EXTENDED RELEASE ORAL at 08:59

## 2021-09-05 RX ADMIN — ALBUTEROL SULFATE 2 PUFF: 90 AEROSOL, METERED RESPIRATORY (INHALATION) at 02:47

## 2021-09-05 RX ADMIN — PANTOPRAZOLE SODIUM 40 MG: 40 TABLET, DELAYED RELEASE ORAL at 06:42

## 2021-09-05 RX ADMIN — FLUOXETINE 40 MG: 20 CAPSULE ORAL at 08:59

## 2021-09-05 RX ADMIN — TRAZODONE HYDROCHLORIDE 50 MG: 50 TABLET ORAL at 21:17

## 2021-09-05 RX ADMIN — INSULIN LISPRO 2 UNITS: 100 INJECTION, SOLUTION INTRAVENOUS; SUBCUTANEOUS at 13:53

## 2021-09-05 RX ADMIN — LEVOTHYROXINE SODIUM 112 MCG: 0.11 TABLET ORAL at 06:42

## 2021-09-05 RX ADMIN — POLYETHYLENE GLYCOL 3350 17 G: 17 POWDER, FOR SOLUTION ORAL at 08:59

## 2021-09-05 RX ADMIN — FUROSEMIDE 20 MG: 10 INJECTION INTRAMUSCULAR; INTRAVENOUS at 09:59

## 2021-09-05 RX ADMIN — Medication 220 MG: at 08:59

## 2021-09-05 RX ADMIN — BUDESONIDE AND FORMOTEROL FUMARATE DIHYDRATE 2 PUFF: 160; 4.5 AEROSOL RESPIRATORY (INHALATION) at 07:01

## 2021-09-05 RX ADMIN — DOCUSATE SODIUM 100 MG: 100 CAPSULE, LIQUID FILLED ORAL at 08:59

## 2021-09-05 RX ADMIN — DEXAMETHASONE SODIUM PHOSPHATE 6 MG: 4 INJECTION, SOLUTION INTRAMUSCULAR; INTRAVENOUS at 21:17

## 2021-09-05 RX ADMIN — Medication 3 ML: at 21:18

## 2021-09-05 RX ADMIN — Medication 1000 UNITS: at 08:59

## 2021-09-05 RX ADMIN — ALBUTEROL SULFATE 2 PUFF: 90 AEROSOL, METERED RESPIRATORY (INHALATION) at 07:02

## 2021-09-05 RX ADMIN — ONDANSETRON HYDROCHLORIDE 4 MG: 4 TABLET, FILM COATED ORAL at 09:59

## 2021-09-05 RX ADMIN — ALBUTEROL SULFATE 2 PUFF: 90 AEROSOL, METERED RESPIRATORY (INHALATION) at 14:52

## 2021-09-05 RX ADMIN — Medication 3 ML: at 08:59

## 2021-09-05 RX ADMIN — ALBUTEROL SULFATE 2 PUFF: 90 AEROSOL, METERED RESPIRATORY (INHALATION) at 23:04

## 2021-09-05 RX ADMIN — DEXAMETHASONE SODIUM PHOSPHATE 6 MG: 4 INJECTION, SOLUTION INTRAMUSCULAR; INTRAVENOUS at 09:03

## 2021-09-05 RX ADMIN — FUROSEMIDE 20 MG: 10 INJECTION INTRAMUSCULAR; INTRAVENOUS at 21:17

## 2021-09-05 RX ADMIN — CEFTRIAXONE SODIUM 1 G: 1 INJECTION, POWDER, FOR SOLUTION INTRAMUSCULAR; INTRAVENOUS at 13:53

## 2021-09-05 NOTE — PLAN OF CARE
Problem: Adult Inpatient Plan of Care  Goal: Plan of Care Review  Outcome: Ongoing, Not Progressing  Flowsheets (Taken 9/5/2021 1750)  Progress: improving  Plan of Care Reviewed With: patient  Outcome Summary: Pt. on PF 25/85 with spo2 sat currently at 95%. Pt. voices no complaints at this time. Will continue to monitor.  Goal: Patient-Specific Goal (Individualized)  Outcome: Ongoing, Not Progressing  Goal: Absence of Hospital-Acquired Illness or Injury  Outcome: Ongoing, Not Progressing  Intervention: Identify and Manage Fall Risk  Recent Flowsheet Documentation  Taken 9/5/2021 1650 by Priscilla Gillespie RN  Safety Promotion/Fall Prevention:   safety round/check completed   assistive device/personal items within reach   clutter free environment maintained  Intervention: Prevent Infection  Recent Flowsheet Documentation  Taken 9/5/2021 1650 by Priscilla Gillespie RN  Infection Prevention:   cohorting utilized   single patient room provided   visitors restricted/screened  Goal: Optimal Comfort and Wellbeing  Outcome: Ongoing, Not Progressing  Goal: Readiness for Transition of Care  Outcome: Ongoing, Not Progressing     Problem: Pain Acute  Goal: Optimal Pain Control  Outcome: Ongoing, Not Progressing     Problem: Skin Injury Risk Increased  Goal: Skin Health and Integrity  Outcome: Ongoing, Not Progressing     Problem: Fall Injury Risk  Goal: Absence of Fall and Fall-Related Injury  Outcome: Ongoing, Not Progressing  Intervention: Promote Injury-Free Environment  Recent Flowsheet Documentation  Taken 9/5/2021 1650 by Priscilla Gillespie RN  Safety Promotion/Fall Prevention:   safety round/check completed   assistive device/personal items within reach   clutter free environment maintained   Goal Outcome Evaluation:  Plan of Care Reviewed With: patient        Progress: improving  Outcome Summary: Pt. on PF 25/85 with spo2 sat currently at 95%. Pt. voices no complaints at this time. Will continue to monitor.

## 2021-09-05 NOTE — PROGRESS NOTES
"    Reason for follow-up: Atrial fibrillation     Patient Care Team:  Jaya Harper MD as PCP - General  Grief, Jaya RODRIGUEZ MD as PCP - Family Medicine    Subjective .   Ms. Montiel is feeling well this morning, she denies any chest pain or shortness of breath.     ROS    Clarithromycin    Scheduled Meds:albuterol sulfate HFA, 2 puff, Inhalation, Q4H - RT  ascorbic acid, 500 mg, Oral, Daily  budesonide-formoterol, 2 puff, Inhalation, BID - RT  cefTRIAXone, 1 g, Intravenous, Q24H  cholecalciferol, 1,000 Units, Oral, Daily  dexamethasone, 6 mg, Intravenous, Q12H  dilTIAZem CD, 120 mg, Oral, Q24H  docusate sodium, 100 mg, Oral, BID  doxycycline, 100 mg, Oral, Q12H  FLUoxetine, 40 mg, Oral, Daily  furosemide, 20 mg, Intravenous, Q12H  insulin lispro, 0-9 Units, Subcutaneous, TID AC  levothyroxine, 112 mcg, Oral, Q AM  montelukast, 10 mg, Oral, Nightly  pantoprazole, 40 mg, Oral, QAM  polyethylene glycol, 17 g, Oral, Daily  rivaroxaban, 20 mg, Oral, Daily With Dinner  sodium chloride, 3 mL, Intravenous, Q12H  traZODone, 50 mg, Oral, Nightly  zinc sulfate, 220 mg, Oral, Daily      Continuous Infusions:   PRN Meds:.benzonatate  •  dextrose  •  dextrose  •  glucagon (human recombinant)  •  HYDROcod Polst-CPM Polst ER  •  insulin lispro **AND** insulin lispro  •  LORazepam  •  melatonin  •  metoprolol tartrate  •  Morphine  •  ondansetron  •  sodium chloride  •  [COMPLETED] Insert peripheral IV **AND** sodium chloride  •  sodium chloride      VITAL SIGNS  Vitals:    09/05/21 0250 09/05/21 0312 09/05/21 0607 09/05/21 0702   BP:  117/55 134/64    BP Location:       Patient Position:       Pulse: 63 64 67 83   Resp: 20 18 20 20   Temp:  96.1 °F (35.6 °C) 96.8 °F (36 °C)    TempSrc:  Oral Axillary    SpO2: 93% 91% (!) 87% 92%   Weight:       Height:           Flowsheet Rows      First Filed Value   Admission Height  167.6 cm (66\") Documented at 08/29/2021 1152   Admission Weight  118 kg (259 lb 11.2 oz) Documented at " 08/29/2021 1152           TELEMETRY: Sinus rhythm    Physical Exam  VITALS REVIEWED    General:      well developed, in no acute distress.    Head:      normocephalic and atraumatic.    Eyes:      PERRL/EOM intact, conjunctiva and sclera clear with out nystagmus.    Neck:      no masses, thyromegaly,  trachea central with normal respiratory effort and PMI displaced laterally  Lungs:      Clear to auscultation bilaterally  Heart:       Regular rate and rhythm, no murmur rubs or gallops  Msk:      no deformity or scoliosis noted of thoracic or lumbar spine.    Pulses:      pulses normal in all 4 extremities.    Extremities:       No lower extremity edema  Neurologic:      no focal deficits.   alert oriented x3  Skin:      intact without lesions or rashes.    Psych:      alert and cooperative; normal mood and affect; normal attention span and concentration.          LAB RESULTS (LAST 7 DAYS)    CBC  Results from last 7 days   Lab Units 09/05/21 0223 09/04/21 0312 09/03/21 0241 09/02/21 0227 09/01/21 0327 08/31/21  0420 08/30/21  0839   WBC 10*3/mm3 15.20* 15.10* 13.90* 11.80* 11.50* 9.00 9.30   RBC 10*6/mm3 3.84 3.81 3.84 3.97 4.16 4.30 4.19   HEMOGLOBIN g/dL 11.9* 12.1 11.8* 12.6 13.4 13.7 13.5   HEMATOCRIT % 35.9 35.6 34.9 37.2 38.9 40.4 39.3   MCV fL 93.5 93.2 90.9 93.6 93.6 93.9 93.8   PLATELETS 10*3/mm3 365 345 324 272 218 190 175       BMP  Results from last 7 days   Lab Units 09/05/21 0223 09/04/21 0312 09/03/21  0241 09/02/21 0227 09/01/21  0327 08/31/21  0420 08/30/21  0839   SODIUM mmol/L 131* 132* 134* 133* 133* 134* 135*   POTASSIUM mmol/L 4.3 4.2 3.8 4.1 4.3 4.1 3.9   CHLORIDE mmol/L 94* 96* 98 97* 99 99 97*   CO2 mmol/L 26.0 26.0 24.0 24.0 23.0 22.0 24.0   BUN mg/dL 34* 31* 32* 38* 36* 27* 23*   CREATININE mg/dL 0.98 0.97 0.96 1.07* 1.09* 1.13* 1.00   GLUCOSE mg/dL 145* 139* 145* 153* 148* 152* 99       CMP   Results from last 7 days   Lab Units 09/05/21  0223 09/04/21  0312 09/03/21  0241  09/02/21  0227 09/01/21  0327 08/31/21  0420 08/30/21  0839   SODIUM mmol/L 131* 132* 134* 133* 133* 134* 135*   POTASSIUM mmol/L 4.3 4.2 3.8 4.1 4.3 4.1 3.9   CHLORIDE mmol/L 94* 96* 98 97* 99 99 97*   CO2 mmol/L 26.0 26.0 24.0 24.0 23.0 22.0 24.0   BUN mg/dL 34* 31* 32* 38* 36* 27* 23*   CREATININE mg/dL 0.98 0.97 0.96 1.07* 1.09* 1.13* 1.00   GLUCOSE mg/dL 145* 139* 145* 153* 148* 152* 99   ALBUMIN g/dL 2.90* 3.10* 3.10* 3.10* 3.20* 3.40* 3.70   BILIRUBIN mg/dL 0.3 0.3 0.3 0.3 0.3 0.3 0.3   ALK PHOS U/L 67 71 62 63 66 72 75   AST (SGOT) U/L 19 22 21 23 30 38* 39*   ALT (SGPT) U/L 15 16 17 18 21 23 26         BNP        TROPONIN        CoAg        Creatinine Clearance  Estimated Creatinine Clearance: 83 mL/min (by C-G formula based on SCr of 0.98 mg/dL).    ABG  Results from last 7 days   Lab Units 09/01/21  0855 08/29/21  1236   PH, ARTERIAL pH units 7.421 7.449   PCO2, ARTERIAL mm Hg 31.9* 30.0*   PO2 ART mm Hg 50.4* 65.7*   O2 SATURATION ART % 86.5* 93.9*   BASE EXCESS ART mmol/L -2.7* -2.1*       Assessment/Plan       Pneumonia due to COVID-19 virus    Hypoxia      Ms. Montiel is a 56-year-old female patient who was admitted for pneumonia due to COVID-19 infection.  On admission she was in sinus rhythm but developed new onset atrial fibrillation with rapid ventricular rate.  She was started on rate control therapy with Cardizem and as needed metoprolol.  Also placed on Xarelto for stroke prevention.  Patient spontaneously converted back to sinus rhythm and and remains in sinus rhythm. Continue p.o. Cardizem and Xarelto for now.    I discussed the patients findings and my recommendations with patient and agrees with the outlined plan    Frederic Garcia MD  09/05/21  10:30 EDT

## 2021-09-05 NOTE — PROGRESS NOTES
LOS: 7 days   Patient Care Team:  Jaya Harper MD as PCP - General  GriefJaya MD as PCP - Family Medicine    Subjective:  Conversational dyspnea  Objective:     Afebrile      Review of Systems:   Review of Systems   Respiratory: Positive for shortness of breath.    Cardiovascular: Negative for chest pain.   Neurological: Positive for weakness.           Vital Signs  Temp:  [96.1 °F (35.6 °C)-98.7 °F (37.1 °C)] 96.8 °F (36 °C)  Heart Rate:  [63-87] 83  Resp:  [18-24] 20  BP: (117-141)/(54-77) 134/64    Physical Exam:  Physical Exam  Vitals and nursing note reviewed.   Cardiovascular:      Rate and Rhythm: Rhythm irregular.      Heart sounds: Normal heart sounds.   Pulmonary:      Effort: Respiratory distress present.      Breath sounds: Wheezing present.   Abdominal:      Palpations: Abdomen is soft.   Skin:     General: Skin is warm.   Neurological:      General: No focal deficit present.      Mental Status: She is oriented to person, place, and time.          Radiology:  XR Chest 1 View    Result Date: 9/1/2021  1.Patchy bilateral airspace opacities appear slightly worse on the left, suggesting pneumonia. 2.Small left pleural effusion.  Electronically Signed By-Alex Adame MD On:9/1/2021 7:59 AM This report was finalized on [id number] by  Alex Adame MD.    XR Chest 1 View    Result Date: 8/31/2021  Patchy bilateral airspace opacities appear slightly worse, suggesting pneumonia.  Electronically Signed By-Alex Adame MD On:8/31/2021 7:49 AM This report was finalized on [id number] by  Alex Adame MD.    XR Chest 1 View    Result Date: 8/29/2021  Abnormal appearance of the lungs which can be seen with Covid 19 pneumonia.  Electronically Signed By-Hubert Campbell MD On:8/29/2021 1:08 PM This report was finalized on [id number] by  Hubert Campbell MD.    XR Abdomen KUB    Result Date: 9/2/2021  1. Unremarkable bowel gas pattern.  Electronically Signed By-Breanna Sanchez MD  On:9/2/2021 11:29 PM This report was finalized on [id number] by  Breanna Sanchez MD.         Results Review:     I reviewed the patient's new clinical results.  I reviewed the patient's new imaging results and agree with the interpretation.    Medication Review:   Scheduled Meds:albuterol sulfate HFA, 2 puff, Inhalation, Q4H - RT  ascorbic acid, 500 mg, Oral, Daily  budesonide-formoterol, 2 puff, Inhalation, BID - RT  cefTRIAXone, 1 g, Intravenous, Q24H  cholecalciferol, 1,000 Units, Oral, Daily  dexamethasone, 6 mg, Intravenous, Q12H  dilTIAZem CD, 120 mg, Oral, Q24H  docusate sodium, 100 mg, Oral, BID  doxycycline, 100 mg, Oral, Q12H  FLUoxetine, 40 mg, Oral, Daily  furosemide, 20 mg, Intravenous, Q12H  insulin lispro, 0-9 Units, Subcutaneous, TID AC  levothyroxine, 112 mcg, Oral, Q AM  montelukast, 10 mg, Oral, Nightly  pantoprazole, 40 mg, Oral, QAM  polyethylene glycol, 17 g, Oral, Daily  rivaroxaban, 20 mg, Oral, Daily With Dinner  sodium chloride, 3 mL, Intravenous, Q12H  traZODone, 50 mg, Oral, Nightly  zinc sulfate, 220 mg, Oral, Daily      Continuous Infusions:   PRN Meds:.benzonatate  •  dextrose  •  dextrose  •  glucagon (human recombinant)  •  HYDROcod Polst-CPM Polst ER  •  insulin lispro **AND** insulin lispro  •  LORazepam  •  melatonin  •  metoprolol tartrate  •  Morphine  •  ondansetron  •  sodium chloride  •  [COMPLETED] Insert peripheral IV **AND** sodium chloride  •  sodium chloride    Labs:    CBC    Results from last 7 days   Lab Units 09/05/21  0223 09/04/21  0312 09/03/21  0241 09/02/21  0227 09/01/21  0327 08/31/21  0420 08/30/21  0839   WBC 10*3/mm3 15.20* 15.10* 13.90* 11.80* 11.50* 9.00 9.30   HEMOGLOBIN g/dL 11.9* 12.1 11.8* 12.6 13.4 13.7 13.5   PLATELETS 10*3/mm3 365 345 324 272 218 190 175     BMP   Results from last 7 days   Lab Units 09/05/21  0223 09/04/21  0312 09/03/21  0241 09/02/21  0227 09/01/21  0327 08/31/21  0420 08/30/21  0839   SODIUM mmol/L 131* 132* 134* 133* 133*  134* 135*   POTASSIUM mmol/L 4.3 4.2 3.8 4.1 4.3 4.1 3.9   CHLORIDE mmol/L 94* 96* 98 97* 99 99 97*   CO2 mmol/L 26.0 26.0 24.0 24.0 23.0 22.0 24.0   BUN mg/dL 34* 31* 32* 38* 36* 27* 23*   CREATININE mg/dL 0.98 0.97 0.96 1.07* 1.09* 1.13* 1.00   GLUCOSE mg/dL 145* 139* 145* 153* 148* 152* 99     Cr Clearance Estimated Creatinine Clearance: 83 mL/min (by C-G formula based on SCr of 0.98 mg/dL).  Coag     HbA1C No results found for: HGBA1C  Blood Glucose   Glucose   Date/Time Value Ref Range Status   09/05/2021 0710 128 (H) 70 - 105 mg/dL Final     Comment:     Serial Number: [id number]:  [id number]   09/04/2021 1623 166 (H) 70 - 105 mg/dL Final     Comment:     Serial Number: [id number]:  [id number]   09/04/2021 1151 152 (H) 70 - 105 mg/dL Final     Comment:     Serial Number: [id number]:  [id number]   09/04/2021 0707 130 (H) 70 - 105 mg/dL Final     Comment:     Serial Number: [id number]:  [id number]   09/03/2021 2103 153 (H) 70 - 105 mg/dL Final     Comment:     Serial Number: [id number]:  [id number]   09/03/2021 1642 126 (H) 70 - 105 mg/dL Final     Comment:     Serial Number: [id number]:  [id number]   09/03/2021 1102 151 (H) 70 - 105 mg/dL Final     Comment:     Serial Number: [id number]:  [id number]   09/03/2021 0714 140 (H) 70 - 105 mg/dL Final     Comment:     Serial Number: [id number]:  [id number]     Infection   Results from last 7 days   Lab Units 08/30/21  1549 08/29/21  1227 08/29/21  1215   BLOODCX  No growth at 5 days  No growth at 5 days No growth at 5 days No growth at 5 days     CMP   Results from last 7 days   Lab Units 09/05/21  0223 09/04/21  0312 09/03/21  0241 09/02/21  0227 09/01/21  0327 08/31/21  0420 08/30/21  0839   SODIUM mmol/L 131* 132* 134* 133* 133* 134* 135*   POTASSIUM mmol/L 4.3 4.2 3.8 4.1 4.3 4.1 3.9   CHLORIDE mmol/L 94* 96* 98 97* 99 99 97*   CO2 mmol/L 26.0 26.0 24.0 24.0 23.0 22.0 24.0   BUN mg/dL 34* 31* 32* 38* 36* 27* 23*    CREATININE mg/dL 0.98 0.97 0.96 1.07* 1.09* 1.13* 1.00   GLUCOSE mg/dL 145* 139* 145* 153* 148* 152* 99   ALBUMIN g/dL 2.90* 3.10* 3.10* 3.10* 3.20* 3.40* 3.70   BILIRUBIN mg/dL 0.3 0.3 0.3 0.3 0.3 0.3 0.3   ALK PHOS U/L 67 71 62 63 66 72 75   AST (SGOT) U/L 19 22 21 23 30 38* 39*   ALT (SGPT) U/L 15 16 17 18 21 23 26     UA      Radiology(recent) No radiology results for the last day   Assessment:  PNA secondary to COVID-19  Acute hypoxic respiratory failure  AFIB/RVR  Hyponatremia  OAT  NEELIMA  Hypoventilation apnea syndrome with NEELIMA  Obesity hypoventilation syndrome  Acquired hypothyroidism  GERD without esophagitis  CKD IIIa  Htn associated with CKD IIIa    Plan:  Continue present approach        Hubert Colbert MD  09/05/21  09:25 EDT

## 2021-09-05 NOTE — PROGRESS NOTES
"PULMONARY CRITICAL CARE [facility]  NOTE      PATIENT IDENTIFICATION:  Name: Danita Montiel  MRN: [id number]  :  1965     Age: 56 y.o.  Sex: female    DATE OF Note:  2021   Referring Physician: Korin Lopez MD                  Subjective:   Still on PF 40/100, states she feels a little better,  no chest or abd pain, no bowel or bladder issues    Objective:  tMax 24 hrs: Temp (24hrs), Av.5 °F (36.4 °C), Min:96.1 °F (35.6 °C), Max:98.7 °F (37.1 °C)      Vitals Ranges:   Temp:  [96.1 °F (35.6 °C)-98.7 °F (37.1 °C)] 96.8 °F (36 °C)  Heart Rate:  [63-87] 74  Resp:  [18-24] 20  BP: (117-141)/(54-68) 134/64    Intake and Output Last 3 Shifts:   I/O last 3 completed shifts:  In: 600 [P.O.:600]  Out: 3500 [Urine:3500]    Exam:  /64   Pulse 74   Temp 96.8 °F (36 °C) (Axillary)   Resp 20   Ht 167.6 cm (66\")   Wt 121 kg (266 lb 8.6 oz)   LMP  (LMP Unknown)   SpO2 92%   BMI 43.02 kg/m²     General Appearance: Alert    HEENT:  Normocephalic, without obvious abnormality, Conjunctiva/corneas clear,.  Normal external ear canals, Nares normal, no drainage     Neck:  Supple, symmetrical, trachea midline. No JVD.  Lungs /Chest wall:   Bilateral basal rhonchi, respirations unlabored symmetrical wall movement.     Heart:  Regular rate and rhythm, systolic murmur PMI left sternal border  Abdomen: Soft, non-tender, no masses, no organomegaly.    Extremities: Trace edema no clubbing or Cyanosis        Medications:    Current Facility-Administered Medications:   •  albuterol sulfate HFA (PROVENTIL HFA;VENTOLIN HFA;PROAIR HFA) inhaler 2 puff, 2 puff, Inhalation, Q4H - RT, Korin Lopez MD, 2 puff at 21 1104  •  ascorbic acid (VITAMIN C) tablet 500 mg, 500 mg, Oral, Daily, Korin Lopez MD, 500 mg at 21 0859  •  benzonatate (TESSALON) capsule 200 mg, 200 mg, Oral, TID PRN, Korin Lopez MD, 200 mg at 21  •  budesonide-formoterol (SYMBICORT) 160-4.5 MCG/ACT inhaler 2 puff, 2 puff, Inhalation, " BID - RT, Mathieu Mars MD, 2 puff at 09/05/21 0701  •  cefTRIAXone (ROCEPHIN) 1 g in sodium chloride 0.9 % 100 mL IVPB, 1 g, Intravenous, Q24H, Hubert Colbert MD, Last Rate: 200 mL/hr at 09/04/21 1319, 1 g at 09/04/21 1319  •  cholecalciferol (VITAMIN D3) tablet 1,000 Units, 1,000 Units, Oral, Daily, Korin Lopez MD, 1,000 Units at 09/05/21 0859  •  dexamethasone (DECADRON) injection 6 mg, 6 mg, Intravenous, Q12H, Jessica Donis APRN, 6 mg at 09/05/21 0903  •  dextrose (D50W) 25 g/ 50mL Intravenous Solution 25 g, 25 g, Intravenous, Q15 Min PRN, Jessica Donis APRN  •  dextrose (GLUTOSE) oral gel 15 g, 15 g, Oral, Q15 Min PRN, Jessica Donis APRN  •  dilTIAZem CD (CARDIZEM CD) 24 hr capsule 120 mg, 120 mg, Oral, Q24H, Brennen Tobar MD, 120 mg at 09/05/21 0859  •  docusate sodium (COLACE) capsule 100 mg, 100 mg, Oral, BID, Shana Diaz MD, 100 mg at 09/05/21 0859  •  doxycycline (ADOXA) tablet 100 mg, 100 mg, Oral, Q12H, Korin Lopez MD, 100 mg at 09/05/21 0859  •  FLUoxetine (PROzac) capsule 40 mg, 40 mg, Oral, Daily, Korin Lopez MD, 40 mg at 09/05/21 0859  •  furosemide (LASIX) injection 20 mg, 20 mg, Intravenous, Q12H, Jessica Donis APRN, 20 mg at 09/05/21 0959  •  glucagon (human recombinant) (GLUCAGEN DIAGNOSTIC) 1 mg in sterile water (preservative free) 1 mL injection, 1 mg, Subcutaneous, Q15 Min PRN, Jessica Donis APRN  •  HYDROcod Polst-CPM Polst ER (TUSSIONEX PENNKINETIC) 10-8 MG/5ML ER suspension 5 mL, 5 mL, Oral, Q12H PRN, Mathieu Mars MD, 5 mL at 09/05/21 0735  •  insulin lispro (ADMELOG) injection 0-9 Units, 0-9 Units, Subcutaneous, TID AC, 2 Units at 09/04/21 1843 **AND** insulin lispro (ADMELOG) injection 0-9 Units, 0-9 Units, Subcutaneous, PRN, Jessica Donis, APRN  •  levothyroxine (SYNTHROID, LEVOTHROID) tablet 112 mcg, 112 mcg, Oral, Q AM, Korin Lopez MD, 112 mcg at 09/05/21 0642  •  LORazepam (ATIVAN) tablet 1 mg, 1 mg, Oral, Q6H PRN, Hubert Colbert MD,  1 mg at 09/04/21 2106  •  melatonin tablet 5 mg, 5 mg, Oral, Nightly PRN, Korin Lopez MD, 5 mg at 09/04/21 2106  •  metoprolol tartrate (LOPRESSOR) injection 5 mg, 5 mg, Intravenous, Q4H PRN, Korin Lopez MD, 5 mg at 09/01/21 0956  •  montelukast (SINGULAIR) tablet 10 mg, 10 mg, Oral, Nightly, Korin Lopez MD, 10 mg at 09/04/21 2105  •  Morphine sulfate (PF) injection 2 mg, 2 mg, Intravenous, Q2H PRN, Shana Diaz MD, 2 mg at 09/04/21 0208  •  ondansetron (ZOFRAN) tablet 4 mg, 4 mg, Oral, Q6H PRN, Korin Lopez MD, 4 mg at 09/05/21 0959  •  pantoprazole (PROTONIX) EC tablet 40 mg, 40 mg, Oral, QAM, Korin Lopez MD, 40 mg at 09/05/21 0642  •  polyethylene glycol (MIRALAX) packet 17 g, 17 g, Oral, Daily, Shana Diaz MD, 17 g at 09/05/21 0859  •  rivaroxaban (XARELTO) tablet 20 mg, 20 mg, Oral, Daily With Dinner, Korin Lopez MD, 20 mg at 09/04/21 1843  •  sodium chloride 0.9 % flush 10 mL, 10 mL, Intravenous, PRN, Korin Lopez MD  •  [COMPLETED] Insert peripheral IV, , , Once **AND** sodium chloride 0.9 % flush 10 mL, 10 mL, Intravenous, PRN, Korin Lopez MD  •  sodium chloride 0.9 % flush 3 mL, 3 mL, Intravenous, Q12H, Korin Lopez MD, 3 mL at 09/05/21 0859  •  sodium chloride 0.9 % flush 3-10 mL, 3-10 mL, Intravenous, PRN, Korin Lopez MD  •  traZODone (DESYREL) tablet 50 mg, 50 mg, Oral, Nightly, Korin Lopez MD, 50 mg at 09/04/21 2106  •  zinc sulfate (ZINCATE) capsule 220 mg, 220 mg, Oral, Daily, Korin Lopez MD, 220 mg at 09/05/21 0859    Data Review:  All labs (24hrs):   Recent Results (from the past 24 hour(s))   POC Glucose Once    Collection Time: 09/04/21  4:23 PM    Specimen: Blood   Result Value Ref Range    Glucose 166 (H) 70 - 105 mg/dL   Comprehensive Metabolic Panel    Collection Time: 09/05/21  2:23 AM    Specimen: Blood   Result Value Ref Range    Glucose 145 (H) 65 - 99 mg/dL    BUN 34 (H) 6 - 20 mg/dL    Creatinine 0.98 0.57 - 1.00 mg/dL    Sodium 131 (L) 136 - 145 mmol/L    Potassium 4.3  3.5 - 5.2 mmol/L    Chloride 94 (L) 98 - 107 mmol/L    CO2 26.0 22.0 - 29.0 mmol/L    Calcium 8.1 (L) 8.6 - 10.5 mg/dL    Total Protein 5.7 (L) 6.0 - 8.5 g/dL    Albumin 2.90 (L) 3.50 - 5.20 g/dL    ALT (SGPT) 15 1 - 33 U/L    AST (SGOT) 19 1 - 32 U/L    Alkaline Phosphatase 67 39 - 117 U/L    Total Bilirubin 0.3 0.0 - 1.2 mg/dL    eGFR Non African Amer 58 (L) >60 mL/min/1.73    Globulin 2.8 gm/dL    A/G Ratio 1.0 g/dL    BUN/Creatinine Ratio 34.7 (H) 7.0 - 25.0    Anion Gap 11.0 5.0 - 15.0 mmol/L   C-reactive Protein    Collection Time: 09/05/21  2:23 AM    Specimen: Blood   Result Value Ref Range    C-Reactive Protein 1.18 (H) 0.00 - 0.50 mg/dL   D-dimer, Quantitative    Collection Time: 09/05/21  2:23 AM    Specimen: Blood   Result Value Ref Range    D-Dimer, Quantitative 0.96 (H) 0.00 - 0.59 mg/L (FEU)   CBC Auto Differential    Collection Time: 09/05/21  2:23 AM    Specimen: Blood   Result Value Ref Range    WBC 15.20 (H) 3.40 - 10.80 10*3/mm3    RBC 3.84 3.77 - 5.28 10*6/mm3    Hemoglobin 11.9 (L) 12.0 - 15.9 g/dL    Hematocrit 35.9 34.0 - 46.6 %    MCV 93.5 79.0 - 97.0 fL    MCH 31.0 26.6 - 33.0 pg    MCHC 33.2 31.5 - 35.7 g/dL    RDW 14.0 12.3 - 15.4 %    RDW-SD 45.9 37.0 - 54.0 fl    MPV 8.6 6.0 - 12.0 fL    Platelets 365 140 - 450 10*3/mm3   Bilirubin, Direct    Collection Time: 09/05/21  2:23 AM    Specimen: Blood   Result Value Ref Range    Bilirubin, Direct <0.2 0.0 - 0.3 mg/dL   Scan Slide    Collection Time: 09/05/21  2:23 AM    Specimen: Blood   Result Value Ref Range    Scan Slide     Manual Differential    Collection Time: 09/05/21  2:23 AM    Specimen: Blood   Result Value Ref Range    Neutrophil % 79.0 (H) 42.7 - 76.0 %    Lymphocyte % 4.0 (L) 19.6 - 45.3 %    Monocyte % 4.0 (L) 5.0 - 12.0 %    Bands %  11.0 (H) 0.0 - 5.0 %    Metamyelocyte % 2.0 (H) 0.0 - 0.0 %    Neutrophils Absolute 13.68 (H) 1.70 - 7.00 10*3/mm3    Lymphocytes Absolute 0.61 (L) 0.70 - 3.10 10*3/mm3    Monocytes Absolute 0.61  0.10 - 0.90 10*3/mm3    nRBC 1.0 (H) 0.0 - 0.2 /100 WBC    RBC Morphology Normal Normal    WBC Morphology Normal Normal    Platelet Estimate Adequate Normal    Large Platelets Slight/1+ None Seen    Giant Platelets Slight/1+ None Seen   POC Glucose Once    Collection Time: 09/05/21  7:10 AM    Specimen: Blood   Result Value Ref Range    Glucose 128 (H) 70 - 105 mg/dL        Imaging:  XR Abdomen KUB  Narrative: DATE OF EXAM:  9/2/2021 11:04 PM     PROCEDURE:  XR ABDOMEN KUB-     INDICATIONS:  RUQ pain; R06.00-Dyspnea, unspecified; U07.1-COVID-19; U07.1-COVID-19;  J12.82-Pneumonia due to Coronavirus disease 2019; R09.02-Hypoxemia     COMPARISON:  Portable chest 09/01/2021     TECHNIQUE:   Single radiographic view of the abdomen was obtained.     FINDINGS:  There are right upper quadrant surgical clips. There is scattered gas  and stool in the colon. There is no gaseous distention of small bowel.  There is no gross soft tissue mass or abnormal calcification. There are  bilateral lower lung opacities greater on left than right, grossly  similar to the prior chest x-ray. There are right upper quadrant  surgical clips.     Impression: 1. Unremarkable bowel gas pattern.      Electronically Signed By-Breanna Sanchez MD On:9/2/2021 11:29 PM  This report was finalized on [id number] by  Breanna Sanchez MD.       ASSESSMENT:  Acute hypoxic respiratory failure on oxygen supplement  COVID-19  Pneumonia   NEELIMA  Asthma  Hypertension  Hypothyroidism  GERD     PLAN:  OOB daily   Continue to decrease O2 as tolerated to keep sats > 90%  Boost TID  Dexa/Vit C/Zinc  Tips and advised to improve his nutrition  Bronchodilator  Inhaled corticosteroids  Electrolytes/ glycemic control  DVT and GI prophylaxis    Discussed with Dr Jerad Parks, TOMMY   9/5/2021  11:54 EDT          I personally have examined  and interviewed the patient. I have reviewed the history, data, problems, assessment and plan with our NP.  Critical care time in  direct medical management (   ) minutes  Electronically signed by Mathieu Mars MD, D,ABS, 09/05/21, 8:16 PM EDT.

## 2021-09-06 PROBLEM — N18.30 BENIGN HYPERTENSION WITH CKD (CHRONIC KIDNEY DISEASE) STAGE III: Status: ACTIVE | Noted: 2021-09-06

## 2021-09-06 PROBLEM — I48.91 ATRIAL FIBRILLATION WITH RVR (HCC): Status: ACTIVE | Noted: 2021-09-06

## 2021-09-06 PROBLEM — I12.9 BENIGN HYPERTENSION WITH CKD (CHRONIC KIDNEY DISEASE) STAGE III (HCC): Status: ACTIVE | Noted: 2021-09-06

## 2021-09-06 LAB
ALBUMIN SERPL-MCNC: 3 G/DL (ref 3.5–5.2)
ALBUMIN/GLOB SERPL: 1.1 G/DL
ALP SERPL-CCNC: 70 U/L (ref 39–117)
ALT SERPL W P-5'-P-CCNC: 16 U/L (ref 1–33)
ANION GAP SERPL CALCULATED.3IONS-SCNC: 10 MMOL/L (ref 5–15)
AST SERPL-CCNC: 20 U/L (ref 1–32)
BILIRUB CONJ SERPL-MCNC: <0.2 MG/DL (ref 0–0.3)
BILIRUB SERPL-MCNC: 0.3 MG/DL (ref 0–1.2)
BUN SERPL-MCNC: 30 MG/DL (ref 6–20)
BUN/CREAT SERPL: 33 (ref 7–25)
CALCIUM SPEC-SCNC: 8.6 MG/DL (ref 8.6–10.5)
CHLORIDE SERPL-SCNC: 95 MMOL/L (ref 98–107)
CO2 SERPL-SCNC: 30 MMOL/L (ref 22–29)
CREAT SERPL-MCNC: 0.91 MG/DL (ref 0.57–1)
CRP SERPL-MCNC: 0.81 MG/DL (ref 0–0.5)
D DIMER PPP FEU-MCNC: 1.68 MG/L (FEU) (ref 0–0.59)
DEPRECATED RDW RBC AUTO: 45.5 FL (ref 37–54)
EOSINOPHIL # BLD MANUAL: 0.15 10*3/MM3 (ref 0–0.4)
EOSINOPHIL NFR BLD MANUAL: 1 % (ref 0.3–6.2)
ERYTHROCYTE [DISTWIDTH] IN BLOOD BY AUTOMATED COUNT: 14.1 % (ref 12.3–15.4)
GFR SERPL CREATININE-BSD FRML MDRD: 63 ML/MIN/1.73
GLOBULIN UR ELPH-MCNC: 2.7 GM/DL
GLUCOSE BLDC GLUCOMTR-MCNC: 105 MG/DL (ref 70–105)
GLUCOSE BLDC GLUCOMTR-MCNC: 116 MG/DL (ref 70–105)
GLUCOSE BLDC GLUCOMTR-MCNC: 152 MG/DL (ref 70–105)
GLUCOSE BLDC GLUCOMTR-MCNC: 97 MG/DL (ref 70–105)
GLUCOSE SERPL-MCNC: 116 MG/DL (ref 65–99)
HCT VFR BLD AUTO: 36.1 % (ref 34–46.6)
HGB BLD-MCNC: 12 G/DL (ref 12–15.9)
LYMPHOCYTES # BLD MANUAL: 1.03 10*3/MM3 (ref 0.7–3.1)
LYMPHOCYTES NFR BLD MANUAL: 6 % (ref 19.6–45.3)
MCH RBC QN AUTO: 30.9 PG (ref 26.6–33)
MCHC RBC AUTO-ENTMCNC: 33.3 G/DL (ref 31.5–35.7)
MCV RBC AUTO: 93 FL (ref 79–97)
METAMYELOCYTES NFR BLD MANUAL: 3 % (ref 0–0)
MYELOCYTES NFR BLD MANUAL: 1 % (ref 0–0)
NEUTROPHILS # BLD AUTO: 12.94 10*3/MM3 (ref 1.7–7)
NEUTROPHILS NFR BLD MANUAL: 83 % (ref 42.7–76)
NEUTS BAND NFR BLD MANUAL: 5 % (ref 0–5)
NEUTS VAC BLD QL SMEAR: ABNORMAL
PLAT MORPH BLD: NORMAL
PLATELET # BLD AUTO: 408 10*3/MM3 (ref 140–450)
PMV BLD AUTO: 8.3 FL (ref 6–12)
POTASSIUM SERPL-SCNC: 5.5 MMOL/L (ref 3.5–5.2)
PROT SERPL-MCNC: 5.7 G/DL (ref 6–8.5)
RBC # BLD AUTO: 3.88 10*6/MM3 (ref 3.77–5.28)
RBC MORPH BLD: NORMAL
SCAN SLIDE: NORMAL
SODIUM SERPL-SCNC: 135 MMOL/L (ref 136–145)
VARIANT LYMPHS NFR BLD MANUAL: 1 % (ref 0–5)
WBC # BLD AUTO: 14.7 10*3/MM3 (ref 3.4–10.8)

## 2021-09-06 PROCEDURE — 94799 UNLISTED PULMONARY SVC/PX: CPT

## 2021-09-06 PROCEDURE — 82248 BILIRUBIN DIRECT: CPT | Performed by: INTERNAL MEDICINE

## 2021-09-06 PROCEDURE — 99232 SBSQ HOSP IP/OBS MODERATE 35: CPT | Performed by: INTERNAL MEDICINE

## 2021-09-06 PROCEDURE — 36415 COLL VENOUS BLD VENIPUNCTURE: CPT | Performed by: INTERNAL MEDICINE

## 2021-09-06 PROCEDURE — 85007 BL SMEAR W/DIFF WBC COUNT: CPT | Performed by: INTERNAL MEDICINE

## 2021-09-06 PROCEDURE — 85379 FIBRIN DEGRADATION QUANT: CPT | Performed by: INTERNAL MEDICINE

## 2021-09-06 PROCEDURE — 86140 C-REACTIVE PROTEIN: CPT | Performed by: INTERNAL MEDICINE

## 2021-09-06 PROCEDURE — 82962 GLUCOSE BLOOD TEST: CPT

## 2021-09-06 PROCEDURE — 80053 COMPREHEN METABOLIC PANEL: CPT | Performed by: INTERNAL MEDICINE

## 2021-09-06 PROCEDURE — 25010000002 CEFTRIAXONE PER 250 MG: Performed by: FAMILY MEDICINE

## 2021-09-06 PROCEDURE — 85025 COMPLETE CBC W/AUTO DIFF WBC: CPT | Performed by: INTERNAL MEDICINE

## 2021-09-06 PROCEDURE — 25010000002 FUROSEMIDE PER 20 MG: Performed by: NURSE PRACTITIONER

## 2021-09-06 PROCEDURE — 25010000002 DEXAMETHASONE PER 1 MG: Performed by: NURSE PRACTITIONER

## 2021-09-06 PROCEDURE — 94760 N-INVAS EAR/PLS OXIMETRY 1: CPT

## 2021-09-06 RX ORDER — GUAIFENESIN 600 MG/1
600 TABLET, EXTENDED RELEASE ORAL EVERY 12 HOURS SCHEDULED
Status: DISCONTINUED | OUTPATIENT
Start: 2021-09-06 | End: 2021-09-10 | Stop reason: HOSPADM

## 2021-09-06 RX ORDER — SODIUM POLYSTYRENE SULFONATE 15 G/60ML
15 SUSPENSION ORAL; RECTAL ONCE
Status: COMPLETED | OUTPATIENT
Start: 2021-09-06 | End: 2021-09-06

## 2021-09-06 RX ADMIN — Medication 600 MG: at 20:27

## 2021-09-06 RX ADMIN — DOCUSATE SODIUM 100 MG: 100 CAPSULE, LIQUID FILLED ORAL at 08:58

## 2021-09-06 RX ADMIN — Medication 3 ML: at 20:28

## 2021-09-06 RX ADMIN — FUROSEMIDE 20 MG: 10 INJECTION INTRAMUSCULAR; INTRAVENOUS at 08:59

## 2021-09-06 RX ADMIN — TRAZODONE HYDROCHLORIDE 50 MG: 50 TABLET ORAL at 20:27

## 2021-09-06 RX ADMIN — ALBUTEROL SULFATE 2 PUFF: 90 AEROSOL, METERED RESPIRATORY (INHALATION) at 17:11

## 2021-09-06 RX ADMIN — DILTIAZEM HYDROCHLORIDE 120 MG: 120 CAPSULE, COATED, EXTENDED RELEASE ORAL at 08:58

## 2021-09-06 RX ADMIN — BUDESONIDE AND FORMOTEROL FUMARATE DIHYDRATE 2 PUFF: 160; 4.5 AEROSOL RESPIRATORY (INHALATION) at 20:38

## 2021-09-06 RX ADMIN — OXYCODONE HYDROCHLORIDE AND ACETAMINOPHEN 500 MG: 500 TABLET ORAL at 08:58

## 2021-09-06 RX ADMIN — DEXAMETHASONE SODIUM PHOSPHATE 6 MG: 4 INJECTION, SOLUTION INTRAMUSCULAR; INTRAVENOUS at 09:00

## 2021-09-06 RX ADMIN — GUAIFENESIN 600 MG: 600 TABLET, EXTENDED RELEASE ORAL at 20:27

## 2021-09-06 RX ADMIN — Medication 3 ML: at 09:00

## 2021-09-06 RX ADMIN — POLYETHYLENE GLYCOL 3350 17 G: 17 POWDER, FOR SOLUTION ORAL at 08:58

## 2021-09-06 RX ADMIN — Medication 1000 UNITS: at 08:58

## 2021-09-06 RX ADMIN — LEVOTHYROXINE SODIUM 112 MCG: 0.11 TABLET ORAL at 08:58

## 2021-09-06 RX ADMIN — FUROSEMIDE 20 MG: 10 INJECTION INTRAMUSCULAR; INTRAVENOUS at 21:55

## 2021-09-06 RX ADMIN — Medication 220 MG: at 08:58

## 2021-09-06 RX ADMIN — ALBUTEROL SULFATE 2 PUFF: 90 AEROSOL, METERED RESPIRATORY (INHALATION) at 20:38

## 2021-09-06 RX ADMIN — ALBUTEROL SULFATE 2 PUFF: 90 AEROSOL, METERED RESPIRATORY (INHALATION) at 13:08

## 2021-09-06 RX ADMIN — ALBUTEROL SULFATE 2 PUFF: 90 AEROSOL, METERED RESPIRATORY (INHALATION) at 22:58

## 2021-09-06 RX ADMIN — SODIUM POLYSTYRENE SULFONATE 15 G: 15 SUSPENSION ORAL; RECTAL at 08:58

## 2021-09-06 RX ADMIN — PANTOPRAZOLE SODIUM 40 MG: 40 TABLET, DELAYED RELEASE ORAL at 08:58

## 2021-09-06 RX ADMIN — BENZONATATE 200 MG: 100 CAPSULE ORAL at 21:55

## 2021-09-06 RX ADMIN — Medication 600 MG: at 12:07

## 2021-09-06 RX ADMIN — HYDROCODONE POLISTIREX AND CHLORPHENIRAMINE POLISTIREX 5 ML: 10; 8 SUSPENSION, EXTENDED RELEASE ORAL at 09:00

## 2021-09-06 RX ADMIN — DEXAMETHASONE SODIUM PHOSPHATE 6 MG: 4 INJECTION, SOLUTION INTRAMUSCULAR; INTRAVENOUS at 21:55

## 2021-09-06 RX ADMIN — FLUOXETINE 40 MG: 20 CAPSULE ORAL at 09:00

## 2021-09-06 RX ADMIN — GUAIFENESIN 600 MG: 600 TABLET, EXTENDED RELEASE ORAL at 12:07

## 2021-09-06 RX ADMIN — MONTELUKAST 10 MG: 10 TABLET, FILM COATED ORAL at 20:27

## 2021-09-06 RX ADMIN — CEFTRIAXONE SODIUM 1 G: 1 INJECTION, POWDER, FOR SOLUTION INTRAMUSCULAR; INTRAVENOUS at 16:56

## 2021-09-06 RX ADMIN — RIVAROXABAN 20 MG: 20 TABLET, FILM COATED ORAL at 16:56

## 2021-09-06 NOTE — PROGRESS NOTES
Daily Progress Note    Pneumonia due to COVID-19 virus    Hypoxia    Atrial fibrillation with RVR (CMS/HCC)    Benign hypertension with CKD (chronic kidney disease) stage III (CMS/[facility])    Assessment    Acute hypoxic respiratory failure secondary to COVID-19 Pneumonia     9/6 on PF 25/85 plan discussed with patient and ; OOB for every meal use IS which was demonstrated at bedside with pull of 500 mL    NEELIMA  Asthma  Hypertension  Hypothyroidism  GERD    Plan    Decadron 6 mg bid  Vit C/Zinc  Antibiotic doxycycline and rocephin  Lasix 20 bid  Add mucomyst/mucinex  OOB for meals encourage IS  Bronchodilator/Inhaled corticosteroids  Gycemic control  Regular diet with supplements for less than 50% eaten at meals  Anticoagulation rivaroxaban    Remdesivir completed      COVID-19 LAB PANEL     COVID-19 test result  COVID19   Date Value Ref Range Status   08/29/2021 Detected (C) Not Detected - Ref. Range Final       COVID-19 Prognostic lab results  WBC with differential  Results from last 7 days   Lab Units 09/06/21 0217 09/05/21 0223 09/04/21 0312 09/03/21 0241 09/02/21 0227 09/01/21 0327 08/31/21  0420   WBC 10*3/mm3 14.70* 15.20* 15.10* 13.90* 11.80* 11.50* 9.00   PLATELETS 10*3/mm3 408 365 345 324 272 218 190   NEUTROPHIL % %  --   --   --  86.9* 87.0* 81.6* 81.2*   LYMPHOCYTE % %  --   --   --  6.1* 6.7* 8.2* 8.9*   NEUTROS ABS 10*3/mm3 12.94* 13.68* 12.53* 12.10* 10.30* 9.40* 7.30*   LYMPHS ABS 10*3/mm3 1.03 0.61* 1.51  --   --   --   --      Inflammatory markers  Results from last 7 days   Lab Units 09/06/21 0217 09/05/21 0223 09/04/21 0312 09/03/21 0241 09/02/21 0227 09/01/21 0327 08/31/21  0420   CRP mg/dL 0.81* 1.18* 1.53* 2.56* 4.51* 10.03* 19.49*   D DIMER QUANT mg/L (FEU) 1.68* 0.96* 0.83* 0.51 0.48 0.53 0.58   ALK PHOS U/L 70 67 71 62 63 66 72   AST (SGOT) U/L 20 19 22 21 23 30 38*   ALT (SGPT) U/L 16 15 16 17 18 21 23       Poor COVID-19 outcomes associated with:  Neutrophil:Lymphocyte ratio  >3.5  Thrombocytopenia  LFT's >5x upper limit of normal  LDH >400   LOS: 8 days       Subjective         Objective     Vital signs for last 24 hours:  Vitals:    09/06/21 0204 09/06/21 0300 09/06/21 0500 09/06/21 0911   BP: 135/67  127/73    BP Location: Right arm      Patient Position: Lying      Pulse: 65 61 61    Resp: 20  22    Temp: 96.9 °F (36.1 °C)  96.9 °F (36.1 °C)    TempSrc: Oral  Axillary    SpO2: 96% 94% 95% 95%   Weight:   122 kg (269 lb 10 oz)    Height:           Intake/Output last 3 shifts:  I/O last 3 completed shifts:  In: 540 [P.O.:540]  Out: 4825 [Urine:4825]  Intake/Output this shift:  I/O this shift:  In: 240 [P.O.:240]  Out: -       Radiology  Imaging Results (Last 24 Hours)     ** No results found for the last 24 hours. **          Labs:  Results from last 7 days   Lab Units 09/06/21  0217   WBC 10*3/mm3 14.70*   HEMOGLOBIN g/dL 12.0   HEMATOCRIT % 36.1   PLATELETS 10*3/mm3 408     Results from last 7 days   Lab Units 09/06/21  0217   SODIUM mmol/L 135*   POTASSIUM mmol/L 5.5*   CHLORIDE mmol/L 95*   CO2 mmol/L 30.0*   BUN mg/dL 30*   CREATININE mg/dL 0.91   CALCIUM mg/dL 8.6   BILIRUBIN mg/dL 0.3   ALK PHOS U/L 70   ALT (SGPT) U/L 16   AST (SGOT) U/L 20   GLUCOSE mg/dL 116*     Results from last 7 days   Lab Units 09/01/21  0855   PH, ARTERIAL pH units 7.421   PO2 ART mm Hg 50.4*   PCO2, ARTERIAL mm Hg 31.9*   HCO3 ART mmol/L 20.7*     Results from last 7 days   Lab Units 09/06/21  0217 09/05/21  0223 09/04/21  0312   ALBUMIN g/dL 3.00* 2.90* 3.10*                               Meds:   SCHEDULE  albuterol sulfate HFA, 2 puff, Inhalation, Q4H - RT  ascorbic acid, 500 mg, Oral, Daily  budesonide-formoterol, 2 puff, Inhalation, BID - RT  cefTRIAXone, 1 g, Intravenous, Q24H  cholecalciferol, 1,000 Units, Oral, Daily  dexamethasone, 6 mg, Intravenous, Q12H  dilTIAZem CD, 120 mg, Oral, Q24H  docusate sodium, 100 mg, Oral, BID  FLUoxetine, 40 mg, Oral, Daily  furosemide, 20 mg, Intravenous,  Q12H  insulin lispro, 0-9 Units, Subcutaneous, TID AC  levothyroxine, 112 mcg, Oral, Q AM  montelukast, 10 mg, Oral, Nightly  pantoprazole, 40 mg, Oral, QAM  polyethylene glycol, 17 g, Oral, Daily  rivaroxaban, 20 mg, Oral, Daily With Dinner  sodium chloride, 3 mL, Intravenous, Q12H  traZODone, 50 mg, Oral, Nightly  zinc sulfate, 220 mg, Oral, Daily      Infusions     PRNs  benzonatate  •  dextrose  •  dextrose  •  glucagon (human recombinant)  •  HYDROcod Polst-CPM Polst ER  •  insulin lispro **AND** insulin lispro  •  LORazepam  •  melatonin  •  metoprolol tartrate  •  Morphine  •  ondansetron  •  sodium chloride  •  [COMPLETED] Insert peripheral IV **AND** sodium chloride  •  sodium chloride    Physical Exam:  Physical Exam  Vitals reviewed.   Constitutional:       Appearance: She is ill-appearing.   Pulmonary:      Breath sounds: Rhonchi present.   Skin:     General: Skin is warm and dry.   Neurological:      Mental Status: She is alert and oriented to person, place, and time.         ROS  Review of Systems   Constitutional: Positive for activity change, appetite change and fatigue.   Respiratory: Positive for cough and shortness of breath.        I reviewed the patient's new clinical results.      Electronically signed by TOMMY Barrett, 09/06/21 at 11:29 EDT.

## 2021-09-06 NOTE — PROGRESS NOTES
LOS: 8 days   Patient Care Team:  Jaya Harper MD as PCP - General  GriefJaya MD as PCP - Family Medicine    Subjective     Interval History:    Patient Complaints:  SOA persists- but improved     History taken from: patient    Review of Systems   Constitutional: Positive for activity change and fatigue. Negative for appetite change and fever.   HENT: Negative for trouble swallowing.    Eyes: Negative for visual disturbance.   Respiratory: Positive for cough, shortness of breath and wheezing.    Cardiovascular: Negative for chest pain, palpitations and leg swelling.   Gastrointestinal: Positive for abdominal pain (RUQ_ improved). Negative for constipation and diarrhea.   Genitourinary: Negative for difficulty urinating.   Musculoskeletal: Positive for back pain.   Neurological: Positive for weakness.   Psychiatric/Behavioral: Negative for confusion.           Objective     Vital Signs  Temp:  [96.9 °F (36.1 °C)-98.1 °F (36.7 °C)] 96.9 °F (36.1 °C)  Heart Rate:  [61-85] 61  Resp:  [20-24] 22  BP: (121-137)/(50-73) 127/73    Physical Exam:     General Appearance:    Alert, cooperative, NAD   Head:    Normocephalic, without obvious abnormality, atraumatic   Eyes:            Lids and lashes normal, conjunctivae and sclerae normal, no   icterus, no pallor, corneas clear, PERRLA   Ears:    Ears appear intact with no abnormalities noted   Throat:   No oral lesions, no thrush, oral mucosa moist   Neck:   No adenopathy, supple, trachea midline, no thyromegaly, no   carotid bruit, no JVD   Lungs:     Rhonchi throughout.     Heart:    Regular rhythm and normal rate, normal S1 and S2, no            murmur, no gallop, no rub, no click   Chest Wall:    No abnormalities observed   Abdomen:     Normal bowel sounds, no masses, no organomegaly, soft        Non-tender non-distended, no guarding,   Extremities:   Moves all extremities well, no edema, no cyanosis, no             Redness   Pulses:   Pulses palpable and  equal bilaterally   Skin:   No bleeding, bruising or rash   Lymph nodes:   No palpable adenopathy   Neurologic:   Cranial nerves 2 - 12 grossly intact, sensation intact, DTR       present and equal bilaterally        Results Review:    Lab Results (last 24 hours)     Procedure Component Value Units Date/Time    Manual Differential [[id number]]  (Abnormal) Collected: 09/06/21 0217    Specimen: Blood Updated: 09/06/21 0629     Neutrophil % 83.0 %      Lymphocyte % 6.0 %      Eosinophil % 1.0 %      Bands %  5.0 %      Metamyelocyte % 3.0 %      Myelocyte % 1.0 %      Atypical Lymphocyte % 1.0 %      Neutrophils Absolute 12.94 10*3/mm3      Lymphocytes Absolute 1.03 10*3/mm3      Eosinophils Absolute 0.15 10*3/mm3      RBC Morphology Normal     Vacuolated Neutrophils Slight/1+     Platelet Morphology Normal    CBC & Differential [[id number]]  (Abnormal) Collected: 09/06/21 0217    Specimen: Blood Updated: 09/06/21 0629    Narrative:      The following orders were created for panel order CBC & Differential.  Procedure                               Abnormality         Status                     ---------                               -----------         ------                     CBC Auto Differential[[id number]]        Abnormal            Final result               Scan Slide[[id number]]                                       Final result                 Please view results for these tests on the individual orders.    Scan Slide [[id number]] Collected: 09/06/21 0217    Specimen: Blood Updated: 09/06/21 0629     Scan Slide --     Comment: See Manual Differential Results       CBC Auto Differential [[id number]]  (Abnormal) Collected: 09/06/21 0217    Specimen: Blood Updated: 09/06/21 0629     WBC 14.70 10*3/mm3      RBC 3.88 10*6/mm3      Hemoglobin 12.0 g/dL      Hematocrit 36.1 %      MCV 93.0 fL      MCH 30.9 pg      MCHC 33.3 g/dL      RDW 14.1 %      RDW-SD 45.5 fl      MPV 8.3 fL      Platelets 408 10*3/mm3     Narrative:       The previously reported component NRBC is no longer being reported. Previous result was 0.0 /100 WBC (Reference Range: 0.0-0.2 /100 WBC) on 9/6/2021 at 0315 EDT.    Comprehensive Metabolic Panel [[id number]]  (Abnormal) Collected: 09/06/21 0217    Specimen: Blood Updated: 09/06/21 0413     Glucose 116 mg/dL      BUN 30 mg/dL      Creatinine 0.91 mg/dL      Sodium 135 mmol/L      Potassium 5.5 mmol/L      Comment: Result checked         Chloride 95 mmol/L      CO2 30.0 mmol/L      Calcium 8.6 mg/dL      Total Protein 5.7 g/dL      Albumin 3.00 g/dL      ALT (SGPT) 16 U/L      AST (SGOT) 20 U/L      Alkaline Phosphatase 70 U/L      Total Bilirubin 0.3 mg/dL      eGFR Non African Amer 63 mL/min/1.73      Globulin 2.7 gm/dL      A/G Ratio 1.1 g/dL      BUN/Creatinine Ratio 33.0     Anion Gap 10.0 mmol/L     Narrative:      GFR Normal >60  Chronic Kidney Disease <60  Kidney Failure <15      Bilirubin, Direct [[id number]]  (Normal) Collected: 09/06/21 0217    Specimen: Blood Updated: 09/06/21 0402     Bilirubin, Direct <0.2 mg/dL     C-reactive Protein [[id number]]  (Abnormal) Collected: 09/06/21 0217    Specimen: Blood Updated: 09/06/21 0402     C-Reactive Protein 0.81 mg/dL     D-dimer, Quantitative [[id number]]  (Abnormal) Collected: 09/06/21 0217    Specimen: Blood Updated: 09/06/21 0338     D-Dimer, Quantitative 1.68 mg/L (FEU)     Narrative:      Reference Range  --------------------------------------------------------------------     < 0.50   Negative Predictive Value  0.50-0.59   Indeterminate    >= 0.60   Probable VTE             A very low percentage of patients with DVT may yield D-Dimer results   below the cut-off of 0.50 mg/L FEU.  This is known to be more   prevalent in patients with distal DVT.             Results of this test should always be interpreted in conjunction with   the patient's medical history, clinical presentation and other   findings.  Clinical diagnosis should not be based on the result of    [facility] D-Dimer alone.    POC Glucose Once [[id number]]  (Abnormal) Collected: 09/05/21 2041    Specimen: Blood Updated: 09/05/21 2042     Glucose 123 mg/dL      Comment: Serial Number: [id number]:  [id number]       POC Glucose Once [[id number]]  (Abnormal) Collected: 09/05/21 1649    Specimen: Blood Updated: 09/05/21 1650     Glucose 139 mg/dL      Comment: Serial Number: [id number]:  [id number]              Imaging Results (Last 24 Hours)     ** No results found for the last 24 hours. **               I reviewed the patient's new clinical results.    Medication Review:   Scheduled Meds:albuterol sulfate HFA, 2 puff, Inhalation, Q4H - RT  ascorbic acid, 500 mg, Oral, Daily  budesonide-formoterol, 2 puff, Inhalation, BID - RT  cefTRIAXone, 1 g, Intravenous, Q24H  cholecalciferol, 1,000 Units, Oral, Daily  dexamethasone, 6 mg, Intravenous, Q12H  dilTIAZem CD, 120 mg, Oral, Q24H  docusate sodium, 100 mg, Oral, BID  doxycycline, 100 mg, Oral, Q12H  FLUoxetine, 40 mg, Oral, Daily  furosemide, 20 mg, Intravenous, Q12H  insulin lispro, 0-9 Units, Subcutaneous, TID AC  levothyroxine, 112 mcg, Oral, Q AM  montelukast, 10 mg, Oral, Nightly  pantoprazole, 40 mg, Oral, QAM  polyethylene glycol, 17 g, Oral, Daily  rivaroxaban, 20 mg, Oral, Daily With Dinner  sodium chloride, 3 mL, Intravenous, Q12H  sodium polystyrene, 15 g, Oral, Once  traZODone, 50 mg, Oral, Nightly  zinc sulfate, 220 mg, Oral, Daily      Continuous Infusions:   PRN Meds:.benzonatate  •  dextrose  •  dextrose  •  glucagon (human recombinant)  •  HYDROcod Polst-CPM Polst ER  •  insulin lispro **AND** insulin lispro  •  LORazepam  •  melatonin  •  metoprolol tartrate  •  Morphine  •  ondansetron  •  sodium chloride  •  [COMPLETED] Insert peripheral IV **AND** sodium chloride  •  sodium chloride     Assessment/Plan       Pneumonia due to COVID-19 virus    Hypoxia    Atrial fibrillation with RVR (CMS/HCC)    Benign hypertension with CKD (chronic  kidney disease) stage III (CMS/HCC)    Pneumonia due to COVID 19- oxygen demand improved. Presently at 25L/ 85%- sats 92-95.     Afib - converted spontaneoulsy back to SR - continue on po cardizem     Hyperkalemia- Kayexalate 15 Gm po today - monitor labs     Plan for disposition: TOMMY Chavez  09/06/21  08:40 EDT

## 2021-09-06 NOTE — PLAN OF CARE
Goal Outcome Evaluation:  Plan of Care Reviewed With: patient    Progress: no change  Outcome Summary: Pt currently on 25/80. Voices that she feels discouraged with her diagnosis. Encouraged ambulation to chair but patient only ambulates to BSC. Coughing and SOB with ambulation. Patients cough productive. K+ high but treated today. Multiple bowel movements noted. Will cont monitoring.      Problem: Adult Inpatient Plan of Care  Goal: Absence of Hospital-Acquired Illness or Injury  Outcome: Ongoing, Progressing  Intervention: Identify and Manage Fall Risk  Recent Flowsheet Documentation  Taken 9/6/2021 1400 by Alycia Sullivan RN  Safety Promotion/Fall Prevention:   safety round/check completed   nonskid shoes/slippers when out of bed   fall prevention program maintained   clutter free environment maintained   assistive device/personal items within reach   activity supervised  Taken 9/6/2021 1000 by Alycia Sullivan RN  Safety Promotion/Fall Prevention:   safety round/check completed   nonskid shoes/slippers when out of bed   fall prevention program maintained   clutter free environment maintained   assistive device/personal items within reach   activity supervised  Taken 9/6/2021 0810 by Alycia Sullivan RN  Safety Promotion/Fall Prevention:   safety round/check completed   nonskid shoes/slippers when out of bed   fall prevention program maintained   clutter free environment maintained   activity supervised   assistive device/personal items within reach  Intervention: Prevent Skin Injury  Recent Flowsheet Documentation  Taken 9/6/2021 0810 by Alycia Sullivan RN  Body Position: position changed independently  Skin Protection:   adhesive use limited   tubing/devices free from skin contact  Intervention: Prevent and Manage VTE (venous thromboembolism) Risk  Recent Flowsheet Documentation  Taken 9/6/2021 0810 by Alycia Sullivan RN  VTE Prevention/Management:   bilateral   sequential compression devices  off  Intervention: Prevent Infection  Recent Flowsheet Documentation  Taken 9/6/2021 1400 by Alycia Sullivan RN  Infection Prevention:   single patient room provided   rest/sleep promoted   personal protective equipment utilized   hand hygiene promoted  Taken 9/6/2021 1000 by Alycia Sullivan RN  Infection Prevention:   single patient room provided   rest/sleep promoted   hand hygiene promoted   personal protective equipment utilized  Taken 9/6/2021 0810 by Alycia Sullivan RN  Infection Prevention:   single patient room provided   rest/sleep promoted   personal protective equipment utilized   hand hygiene promoted

## 2021-09-06 NOTE — PROGRESS NOTES
"    Reason for follow-up: Atrial fibrillation     Patient Care Team:  Jaya Harper MD as PCP - General  Grief, Jaya RODRIGUEZ MD as PCP - Family Medicine    Subjective .   Doing well this morning no complaints     ROS    Clarithromycin    Scheduled Meds:albuterol sulfate HFA, 2 puff, Inhalation, Q4H - RT  ascorbic acid, 500 mg, Oral, Daily  budesonide-formoterol, 2 puff, Inhalation, BID - RT  cefTRIAXone, 1 g, Intravenous, Q24H  cholecalciferol, 1,000 Units, Oral, Daily  dexamethasone, 6 mg, Intravenous, Q12H  dilTIAZem CD, 120 mg, Oral, Q24H  docusate sodium, 100 mg, Oral, BID  FLUoxetine, 40 mg, Oral, Daily  furosemide, 20 mg, Intravenous, Q12H  insulin lispro, 0-9 Units, Subcutaneous, TID AC  levothyroxine, 112 mcg, Oral, Q AM  montelukast, 10 mg, Oral, Nightly  pantoprazole, 40 mg, Oral, QAM  polyethylene glycol, 17 g, Oral, Daily  rivaroxaban, 20 mg, Oral, Daily With Dinner  sodium chloride, 3 mL, Intravenous, Q12H  traZODone, 50 mg, Oral, Nightly  zinc sulfate, 220 mg, Oral, Daily      Continuous Infusions:   PRN Meds:.benzonatate  •  dextrose  •  dextrose  •  glucagon (human recombinant)  •  HYDROcod Polst-CPM Polst ER  •  insulin lispro **AND** insulin lispro  •  LORazepam  •  melatonin  •  metoprolol tartrate  •  Morphine  •  ondansetron  •  sodium chloride  •  [COMPLETED] Insert peripheral IV **AND** sodium chloride  •  sodium chloride      VITAL SIGNS  Vitals:    09/06/21 0204 09/06/21 0300 09/06/21 0500 09/06/21 0911   BP: 135/67  127/73    BP Location: Right arm      Patient Position: Lying      Pulse: 65 61 61    Resp: 20  22    Temp: 96.9 °F (36.1 °C)  96.9 °F (36.1 °C)    TempSrc: Oral  Axillary    SpO2: 96% 94% 95% 95%   Weight:   122 kg (269 lb 10 oz)    Height:           Flowsheet Rows      First Filed Value   Admission Height  167.6 cm (66\") Documented at 08/29/2021 1152   Admission Weight  118 kg (259 lb 11.2 oz) Documented at 08/29/2021 1152           TELEMETRY: Sinus rhythm    Physical " Exam  VITALS REVIEWED    Physical exam was not performed due to Covid positive status, patient however did not seem to be in any distress.    LAB RESULTS (LAST 7 DAYS)    CBC  Results from last 7 days   Lab Units 09/06/21 0217 09/05/21 0223 09/04/21 0312 09/03/21 0241 09/02/21 0227 09/01/21 0327 08/31/21  0420   WBC 10*3/mm3 14.70* 15.20* 15.10* 13.90* 11.80* 11.50* 9.00   RBC 10*6/mm3 3.88 3.84 3.81 3.84 3.97 4.16 4.30   HEMOGLOBIN g/dL 12.0 11.9* 12.1 11.8* 12.6 13.4 13.7   HEMATOCRIT % 36.1 35.9 35.6 34.9 37.2 38.9 40.4   MCV fL 93.0 93.5 93.2 90.9 93.6 93.6 93.9   PLATELETS 10*3/mm3 408 365 345 324 272 218 190       BMP  Results from last 7 days   Lab Units 09/06/21 0217 09/05/21 0223 09/04/21 0312 09/03/21 0241 09/02/21 0227 09/01/21 0327 08/31/21  0420   SODIUM mmol/L 135* 131* 132* 134* 133* 133* 134*   POTASSIUM mmol/L 5.5* 4.3 4.2 3.8 4.1 4.3 4.1   CHLORIDE mmol/L 95* 94* 96* 98 97* 99 99   CO2 mmol/L 30.0* 26.0 26.0 24.0 24.0 23.0 22.0   BUN mg/dL 30* 34* 31* 32* 38* 36* 27*   CREATININE mg/dL 0.91 0.98 0.97 0.96 1.07* 1.09* 1.13*   GLUCOSE mg/dL 116* 145* 139* 145* 153* 148* 152*       CMP   Results from last 7 days   Lab Units 09/06/21 0217 09/05/21 0223 09/04/21 0312 09/03/21 0241 09/02/21 0227 09/01/21 0327 08/31/21  0420   SODIUM mmol/L 135* 131* 132* 134* 133* 133* 134*   POTASSIUM mmol/L 5.5* 4.3 4.2 3.8 4.1 4.3 4.1   CHLORIDE mmol/L 95* 94* 96* 98 97* 99 99   CO2 mmol/L 30.0* 26.0 26.0 24.0 24.0 23.0 22.0   BUN mg/dL 30* 34* 31* 32* 38* 36* 27*   CREATININE mg/dL 0.91 0.98 0.97 0.96 1.07* 1.09* 1.13*   GLUCOSE mg/dL 116* 145* 139* 145* 153* 148* 152*   ALBUMIN g/dL 3.00* 2.90* 3.10* 3.10* 3.10* 3.20* 3.40*   BILIRUBIN mg/dL 0.3 0.3 0.3 0.3 0.3 0.3 0.3   ALK PHOS U/L 70 67 71 62 63 66 72   AST (SGOT) U/L 20 19 22 21 23 30 38*   ALT (SGPT) U/L 16 15 16 17 18 21 23         ABG  Results from last 7 days   Lab Units 09/01/21  0855   PH, ARTERIAL pH units 7.421   PCO2, ARTERIAL mm Hg  31.9*   PO2 ART mm Hg 50.4*   O2 SATURATION ART % 86.5*   BASE EXCESS ART mmol/L -2.7*           Assessment/Plan       Pneumonia due to COVID-19 virus    Hypoxia    Atrial fibrillation with RVR (CMS/[facility])    Benign hypertension with CKD (chronic kidney disease) stage III (CMS/[facility])      Ms. Montiel is a 56-year-old female patient who was admitted for pneumonia due to COVID-19 infection.  On admission she was in sinus rhythm but developed new onset atrial fibrillation with rapid ventricular rate.  She was started on rate control therapy with Cardizem and as needed metoprolol.  Also placed on Xarelto for stroke prevention.  Patient spontaneously converted back to sinus rhythm and and remains in sinus rhythm. Continue p.o. Cardizem and Xarelto for now.  No change in cardiac status since yesterday.    Frederic Garcia MD  09/06/21  10:27 EDT

## 2021-09-07 ENCOUNTER — APPOINTMENT (OUTPATIENT)
Dept: GENERAL RADIOLOGY | Facility: HOSPITAL | Age: 56
End: 2021-09-07

## 2021-09-07 LAB
ALBUMIN SERPL-MCNC: 3.4 G/DL (ref 3.5–5.2)
ALBUMIN/GLOB SERPL: 1.3 G/DL
ALP SERPL-CCNC: 81 U/L (ref 39–117)
ALT SERPL W P-5'-P-CCNC: 17 U/L (ref 1–33)
ANION GAP SERPL CALCULATED.3IONS-SCNC: 9 MMOL/L (ref 5–15)
AST SERPL-CCNC: 19 U/L (ref 1–32)
BILIRUB CONJ SERPL-MCNC: <0.2 MG/DL (ref 0–0.3)
BILIRUB SERPL-MCNC: 0.3 MG/DL (ref 0–1.2)
BUN SERPL-MCNC: 27 MG/DL (ref 6–20)
BUN/CREAT SERPL: 32.5 (ref 7–25)
CALCIUM SPEC-SCNC: 8.7 MG/DL (ref 8.6–10.5)
CHLORIDE SERPL-SCNC: 95 MMOL/L (ref 98–107)
CO2 SERPL-SCNC: 32 MMOL/L (ref 22–29)
CREAT SERPL-MCNC: 0.83 MG/DL (ref 0.57–1)
CRP SERPL-MCNC: 0.93 MG/DL (ref 0–0.5)
D DIMER PPP FEU-MCNC: 2.71 MG/L (FEU) (ref 0–0.59)
DEPRECATED RDW RBC AUTO: 43.8 FL (ref 37–54)
ERYTHROCYTE [DISTWIDTH] IN BLOOD BY AUTOMATED COUNT: 13.8 % (ref 12.3–15.4)
GFR SERPL CREATININE-BSD FRML MDRD: 71 ML/MIN/1.73
GLOBULIN UR ELPH-MCNC: 2.6 GM/DL
GLUCOSE BLDC GLUCOMTR-MCNC: 105 MG/DL (ref 70–105)
GLUCOSE BLDC GLUCOMTR-MCNC: 110 MG/DL (ref 70–105)
GLUCOSE BLDC GLUCOMTR-MCNC: 134 MG/DL (ref 70–105)
GLUCOSE BLDC GLUCOMTR-MCNC: 145 MG/DL (ref 70–105)
GLUCOSE SERPL-MCNC: 120 MG/DL (ref 65–99)
HCT VFR BLD AUTO: 38.3 % (ref 34–46.6)
HGB BLD-MCNC: 12.8 G/DL (ref 12–15.9)
LYMPHOCYTES # BLD MANUAL: 1.35 10*3/MM3 (ref 0.7–3.1)
LYMPHOCYTES NFR BLD MANUAL: 4 % (ref 5–12)
LYMPHOCYTES NFR BLD MANUAL: 9 % (ref 19.6–45.3)
MAGNESIUM SERPL-MCNC: 2.2 MG/DL (ref 1.6–2.6)
MCH RBC QN AUTO: 31.1 PG (ref 26.6–33)
MCHC RBC AUTO-ENTMCNC: 33.5 G/DL (ref 31.5–35.7)
MCV RBC AUTO: 93 FL (ref 79–97)
MONOCYTES # BLD AUTO: 0.6 10*3/MM3 (ref 0.1–0.9)
NEUTROPHILS # BLD AUTO: 13.05 10*3/MM3 (ref 1.7–7)
NEUTROPHILS NFR BLD MANUAL: 77 % (ref 42.7–76)
NEUTS BAND NFR BLD MANUAL: 10 % (ref 0–5)
PLATELET # BLD AUTO: 446 10*3/MM3 (ref 140–450)
PMV BLD AUTO: 8.3 FL (ref 6–12)
POTASSIUM SERPL-SCNC: 4.3 MMOL/L (ref 3.5–5.2)
PROT SERPL-MCNC: 6 G/DL (ref 6–8.5)
RBC # BLD AUTO: 4.12 10*6/MM3 (ref 3.77–5.28)
RBC MORPH BLD: NORMAL
SCAN SLIDE: NORMAL
SMALL PLATELETS BLD QL SMEAR: ADEQUATE
SODIUM SERPL-SCNC: 136 MMOL/L (ref 136–145)
WBC # BLD AUTO: 15 10*3/MM3 (ref 3.4–10.8)
WBC MORPH BLD: NORMAL

## 2021-09-07 PROCEDURE — 25010000002 FUROSEMIDE PER 20 MG: Performed by: NURSE PRACTITIONER

## 2021-09-07 PROCEDURE — 86140 C-REACTIVE PROTEIN: CPT | Performed by: INTERNAL MEDICINE

## 2021-09-07 PROCEDURE — 85379 FIBRIN DEGRADATION QUANT: CPT | Performed by: INTERNAL MEDICINE

## 2021-09-07 PROCEDURE — 36415 COLL VENOUS BLD VENIPUNCTURE: CPT | Performed by: INTERNAL MEDICINE

## 2021-09-07 PROCEDURE — 71045 X-RAY EXAM CHEST 1 VIEW: CPT

## 2021-09-07 PROCEDURE — 82962 GLUCOSE BLOOD TEST: CPT

## 2021-09-07 PROCEDURE — 94799 UNLISTED PULMONARY SVC/PX: CPT

## 2021-09-07 PROCEDURE — 25010000002 CEFTRIAXONE PER 250 MG: Performed by: FAMILY MEDICINE

## 2021-09-07 PROCEDURE — 85025 COMPLETE CBC W/AUTO DIFF WBC: CPT | Performed by: INTERNAL MEDICINE

## 2021-09-07 PROCEDURE — 83735 ASSAY OF MAGNESIUM: CPT | Performed by: NURSE PRACTITIONER

## 2021-09-07 PROCEDURE — 85007 BL SMEAR W/DIFF WBC COUNT: CPT | Performed by: INTERNAL MEDICINE

## 2021-09-07 PROCEDURE — 25010000002 DEXAMETHASONE PER 1 MG: Performed by: NURSE PRACTITIONER

## 2021-09-07 PROCEDURE — 97162 PT EVAL MOD COMPLEX 30 MIN: CPT

## 2021-09-07 PROCEDURE — 82248 BILIRUBIN DIRECT: CPT | Performed by: INTERNAL MEDICINE

## 2021-09-07 PROCEDURE — 97116 GAIT TRAINING THERAPY: CPT

## 2021-09-07 PROCEDURE — 80053 COMPREHEN METABOLIC PANEL: CPT | Performed by: INTERNAL MEDICINE

## 2021-09-07 PROCEDURE — 99232 SBSQ HOSP IP/OBS MODERATE 35: CPT | Performed by: INTERNAL MEDICINE

## 2021-09-07 RX ORDER — ZOLPIDEM TARTRATE 5 MG/1
5 TABLET ORAL NIGHTLY PRN
Status: DISCONTINUED | OUTPATIENT
Start: 2021-09-07 | End: 2021-09-10 | Stop reason: HOSPADM

## 2021-09-07 RX ADMIN — MONTELUKAST 10 MG: 10 TABLET, FILM COATED ORAL at 21:18

## 2021-09-07 RX ADMIN — BUDESONIDE AND FORMOTEROL FUMARATE DIHYDRATE 2 PUFF: 160; 4.5 AEROSOL RESPIRATORY (INHALATION) at 19:00

## 2021-09-07 RX ADMIN — Medication 600 MG: at 08:58

## 2021-09-07 RX ADMIN — ALBUTEROL SULFATE 2 PUFF: 90 AEROSOL, METERED RESPIRATORY (INHALATION) at 22:59

## 2021-09-07 RX ADMIN — OXYCODONE HYDROCHLORIDE AND ACETAMINOPHEN 500 MG: 500 TABLET ORAL at 08:59

## 2021-09-07 RX ADMIN — ALBUTEROL SULFATE 2 PUFF: 90 AEROSOL, METERED RESPIRATORY (INHALATION) at 15:34

## 2021-09-07 RX ADMIN — DILTIAZEM HYDROCHLORIDE 120 MG: 120 CAPSULE, COATED, EXTENDED RELEASE ORAL at 08:58

## 2021-09-07 RX ADMIN — FUROSEMIDE 20 MG: 10 INJECTION INTRAMUSCULAR; INTRAVENOUS at 09:01

## 2021-09-07 RX ADMIN — BENZONATATE 200 MG: 100 CAPSULE ORAL at 21:26

## 2021-09-07 RX ADMIN — CEFTRIAXONE SODIUM 1 G: 1 INJECTION, POWDER, FOR SOLUTION INTRAMUSCULAR; INTRAVENOUS at 13:43

## 2021-09-07 RX ADMIN — RIVAROXABAN 20 MG: 20 TABLET, FILM COATED ORAL at 15:07

## 2021-09-07 RX ADMIN — BUDESONIDE AND FORMOTEROL FUMARATE DIHYDRATE 2 PUFF: 160; 4.5 AEROSOL RESPIRATORY (INHALATION) at 07:20

## 2021-09-07 RX ADMIN — Medication 1000 UNITS: at 08:58

## 2021-09-07 RX ADMIN — Medication 3 ML: at 21:40

## 2021-09-07 RX ADMIN — GUAIFENESIN 600 MG: 600 TABLET, EXTENDED RELEASE ORAL at 08:58

## 2021-09-07 RX ADMIN — TRAZODONE HYDROCHLORIDE 50 MG: 50 TABLET ORAL at 21:18

## 2021-09-07 RX ADMIN — ALBUTEROL SULFATE 2 PUFF: 90 AEROSOL, METERED RESPIRATORY (INHALATION) at 18:58

## 2021-09-07 RX ADMIN — GUAIFENESIN 600 MG: 600 TABLET, EXTENDED RELEASE ORAL at 21:39

## 2021-09-07 RX ADMIN — HYDROCODONE POLISTIREX AND CHLORPHENIRAMINE POLISTIREX 5 ML: 10; 8 SUSPENSION, EXTENDED RELEASE ORAL at 21:39

## 2021-09-07 RX ADMIN — ALBUTEROL SULFATE 2 PUFF: 90 AEROSOL, METERED RESPIRATORY (INHALATION) at 02:31

## 2021-09-07 RX ADMIN — FUROSEMIDE 20 MG: 10 INJECTION INTRAMUSCULAR; INTRAVENOUS at 21:38

## 2021-09-07 RX ADMIN — DEXAMETHASONE SODIUM PHOSPHATE 6 MG: 4 INJECTION, SOLUTION INTRAMUSCULAR; INTRAVENOUS at 09:02

## 2021-09-07 RX ADMIN — PANTOPRAZOLE SODIUM 40 MG: 40 TABLET, DELAYED RELEASE ORAL at 08:58

## 2021-09-07 RX ADMIN — ALBUTEROL SULFATE 2 PUFF: 90 AEROSOL, METERED RESPIRATORY (INHALATION) at 07:20

## 2021-09-07 RX ADMIN — POLYETHYLENE GLYCOL 3350 17 G: 17 POWDER, FOR SOLUTION ORAL at 08:58

## 2021-09-07 RX ADMIN — DEXAMETHASONE SODIUM PHOSPHATE 6 MG: 4 INJECTION, SOLUTION INTRAMUSCULAR; INTRAVENOUS at 21:28

## 2021-09-07 RX ADMIN — FLUOXETINE 40 MG: 20 CAPSULE ORAL at 08:58

## 2021-09-07 RX ADMIN — DOCUSATE SODIUM 100 MG: 100 CAPSULE, LIQUID FILLED ORAL at 21:18

## 2021-09-07 RX ADMIN — Medication 600 MG: at 21:19

## 2021-09-07 RX ADMIN — ALBUTEROL SULFATE 2 PUFF: 90 AEROSOL, METERED RESPIRATORY (INHALATION) at 12:06

## 2021-09-07 RX ADMIN — Medication 220 MG: at 08:58

## 2021-09-07 RX ADMIN — LEVOTHYROXINE SODIUM 112 MCG: 0.11 TABLET ORAL at 05:27

## 2021-09-07 NOTE — PLAN OF CARE
Goal Outcome Evaluation:  Plan of Care Reviewed With: patient           Outcome Summary: 57 yo female adm on 8/29/21 for acute hypoxic respiratory failure. Dx w/ covid pna. 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 She lives w/ her , who works full time. Does not use home o2. Today, pt is quite anxious, stating she is not sure she has enough strength to stand. However, strength upon testing is 4-/5 in BLEs. She was able to come to sitting w/ cga, to stand w/ min assist, and amb w/ one hand held and mod assist x 12 ft. Pt leans forward and reaches for furniture w/ ambulation. She has a wide base of support and a shuffled gait. Recommend home w/ home health, possible home O2, and rw at d/c. Will follow.

## 2021-09-07 NOTE — PROGRESS NOTES
Daily Progress Note    Pneumonia due to COVID-19 virus    Hypoxia    Atrial fibrillation with RVR (CMS/HCC)    Benign hypertension with CKD (chronic kidney disease) stage III (CMS/[facility])    Assessment    Acute hypoxic respiratory failure secondary to COVID-19 Pneumonia     9/6 on PF 25/85 plan discussed with patient and ; OOB for every meal use IS which was demonstrated at bedside with pull of 500 mL  9/7 patient OOB this am with PF down 25/70    NEELIMA  Asthma  Hypertension  Hypothyroidism  GERD    Plan    Decadron 6 mg bid  Vit C/Zinc  Antibiotic doxycycline and rocephin  Lasix 20 bid  Add mucomyst/mucinex  OOB for meals encourage IS  Bronchodilator/Inhaled corticosteroids  Gycemic control  Regular diet with supplements for less than 50% eaten at meals  Anticoagulation rivaroxaban    Remdesivir completed      COVID-19 LAB PANEL     COVID-19 test result  COVID19   Date Value Ref Range Status   08/29/2021 Detected (C) Not Detected - Ref. Range Final       COVID-19 Prognostic lab results  WBC with differential  Results from last 7 days   Lab Units 09/07/21 0331 09/06/21 0217 09/05/21 0223 09/04/21 0312 09/03/21 0241 09/02/21 0227 09/01/21 0327   WBC 10*3/mm3 15.00* 14.70* 15.20* 15.10* 13.90* 11.80* 11.50*   PLATELETS 10*3/mm3 446 408 365 345 324 272 218   NEUTROPHIL % %  --   --   --   --  86.9* 87.0* 81.6*   LYMPHOCYTE % %  --   --   --   --  6.1* 6.7* 8.2*   NEUTROS ABS 10*3/mm3 13.05* 12.94* 13.68* 12.53* 12.10* 10.30* 9.40*   LYMPHS ABS 10*3/mm3 1.35 1.03 0.61* 1.51  --   --   --      Inflammatory markers  Results from last 7 days   Lab Units 09/07/21  0331 09/06/21 0217 09/05/21 0223 09/04/21 0312 09/03/21  0241 09/02/21 0227 09/01/21  0327   CRP mg/dL 0.93* 0.81* 1.18* 1.53* 2.56* 4.51* 10.03*   D DIMER QUANT mg/L (FEU) 2.71* 1.68* 0.96* 0.83* 0.51 0.48 0.53   ALK PHOS U/L 81 70 67 71 62 63 66   AST (SGOT) U/L 19 20 19 22 21 23 30   ALT (SGPT) U/L 17 16 15 16 17 18 21       Poor COVID-19 outcomes  associated with:  Neutrophil:Lymphocyte ratio >3.5  Thrombocytopenia  LFT's >5x upper limit of normal  LDH >400   LOS: 9 days       Subjective         Objective     Vital signs for last 24 hours:  Vitals:    09/07/21 0214 09/07/21 0231 09/07/21 0234 09/07/21 0525   BP: 130/59   147/66   BP Location: Right arm   Right arm   Patient Position: Lying   Sitting   Pulse: 61 70 61    Resp: 18 18 18 20   Temp: 97.2 °F (36.2 °C)   97.8 °F (36.6 °C)   TempSrc: Oral   Oral   SpO2: 93% 94% 94% 96%   Weight:       Height:           Intake/Output last 3 shifts:  I/O last 3 completed shifts:  In: 1140 [P.O.:1140]  Out: 3175 [Urine:3175]  Intake/Output this shift:  I/O this shift:  In: -   Out: 1900 [Urine:1900]      Radiology  Imaging Results (Last 24 Hours)     ** No results found for the last 24 hours. **          Labs:  Results from last 7 days   Lab Units 09/07/21  0331   WBC 10*3/mm3 15.00*   HEMOGLOBIN g/dL 12.8   HEMATOCRIT % 38.3   PLATELETS 10*3/mm3 446     Results from last 7 days   Lab Units 09/07/21  0331   SODIUM mmol/L 136   POTASSIUM mmol/L 4.3   CHLORIDE mmol/L 95*   CO2 mmol/L 32.0*   BUN mg/dL 27*   CREATININE mg/dL 0.83   CALCIUM mg/dL 8.7   BILIRUBIN mg/dL 0.3   ALK PHOS U/L 81   ALT (SGPT) U/L 17   AST (SGOT) U/L 19   GLUCOSE mg/dL 120*     Results from last 7 days   Lab Units 09/01/21  0855   PH, ARTERIAL pH units 7.421   PO2 ART mm Hg 50.4*   PCO2, ARTERIAL mm Hg 31.9*   HCO3 ART mmol/L 20.7*     Results from last 7 days   Lab Units 09/07/21  0331 09/06/21  0217 09/05/21  0223   ALBUMIN g/dL 3.40* 3.00* 2.90*             Results from last 7 days   Lab Units 09/07/21  0331   MAGNESIUM mg/dL 2.2                   Meds:   SCHEDULE  Acetylcysteine, 600 mg, Oral, BID  albuterol sulfate HFA, 2 puff, Inhalation, Q4H - RT  ascorbic acid, 500 mg, Oral, Daily  budesonide-formoterol, 2 puff, Inhalation, BID - RT  cefTRIAXone, 1 g, Intravenous, Q24H  cholecalciferol, 1,000 Units, Oral, Daily  dexamethasone, 6 mg,  Intravenous, Q12H  dilTIAZem CD, 120 mg, Oral, Q24H  docusate sodium, 100 mg, Oral, BID  FLUoxetine, 40 mg, Oral, Daily  furosemide, 20 mg, Intravenous, Q12H  guaiFENesin, 600 mg, Oral, Q12H  insulin lispro, 0-9 Units, Subcutaneous, TID AC  levothyroxine, 112 mcg, Oral, Q AM  montelukast, 10 mg, Oral, Nightly  pantoprazole, 40 mg, Oral, QAM  polyethylene glycol, 17 g, Oral, Daily  rivaroxaban, 20 mg, Oral, Daily With Dinner  sodium chloride, 3 mL, Intravenous, Q12H  traZODone, 50 mg, Oral, Nightly  zinc sulfate, 220 mg, Oral, Daily      Infusions     PRNs  benzonatate  •  dextrose  •  dextrose  •  glucagon (human recombinant)  •  insulin lispro **AND** insulin lispro  •  LORazepam  •  melatonin  •  metoprolol tartrate  •  Morphine  •  ondansetron  •  sodium chloride  •  [COMPLETED] Insert peripheral IV **AND** sodium chloride  •  sodium chloride    Physical Exam:  Physical Exam  Vitals reviewed.   Constitutional:       Appearance: She is ill-appearing.   Pulmonary:      Breath sounds: Rhonchi present.   Skin:     General: Skin is warm and dry.   Neurological:      Mental Status: She is alert and oriented to person, place, and time.         ROS  Review of Systems   Constitutional: Positive for activity change, appetite change and fatigue.   Respiratory: Positive for cough and shortness of breath.        I reviewed the patient's new clinical results.      Electronically signed by TOMMY Barrett, 09/07/21 at 06:53 EDT.

## 2021-09-07 NOTE — PROGRESS NOTES
LOS: 9 days   Patient Care Team:  Jaya Harper MD as PCP - General  GriefJaya MD as PCP - Family Medicine    Subjective     Interval History:    Patient Complaints: overall feeling better.  Tolerating slow wean of PF.  Not sleeping well - requests a sleeping pill.  Appetite improving    History taken from: patient    Review of Systems   Constitutional: Positive for activity change and fatigue. Negative for appetite change, chills and fever.   HENT: Negative for ear pain.    Eyes: Negative for visual disturbance.   Respiratory: Positive for cough, shortness of breath and wheezing. Negative for stridor.    Cardiovascular: Negative for chest pain, palpitations and leg swelling.   Gastrointestinal: Negative for abdominal pain, diarrhea, nausea and vomiting.   Endocrine: Negative for polyuria.   Genitourinary: Negative for dysuria.   Musculoskeletal: Negative for gait problem.   Skin: Negative for rash and wound.   Neurological: Positive for weakness. Negative for dizziness, tremors, seizures and numbness.   Psychiatric/Behavioral: Negative for confusion.           Objective     Vital Signs  Temp:  [97 °F (36.1 °C)-98.5 °F (36.9 °C)] 97.8 °F (36.6 °C)  Heart Rate:  [] 61  Resp:  [18-22] 20  BP: (119-147)/(59-92) 147/70    Physical Exam:     General Appearance:    Alert, cooperative, in no acute distress, sitting up in chair   Head:    Normocephalic, without obvious abnormality, atraumatic   Eyes:            Lids and lashes normal, conjunctivae and sclerae normal, no   icterus, no pallor, corneas clear, PERRLA   Ears:    Ears appear intact with no abnormalities noted   Throat:   No oral lesions, no thrush, oral mucosa moist   Neck:   No adenopathy, supple, trachea midline, no thyromegaly, no   carotid bruit, no JVD   Lungs:     Scattered wheezing    Heart:    Regular rhythm and normal rate, normal S1 and S2, no            murmur, no gallop, no rub, no click   Chest Wall:    No abnormalities  observed   Abdomen:     Normal bowel sounds, no masses, no organomegaly, soft        Non-tender non-distended, no guarding,   Extremities:   Moves all extremities well, no edema, no cyanosis, no             Redness   Pulses:   Pulses palpable and equal bilaterally   Skin:   No bleeding, bruising or rash   Lymph nodes:   No palpable adenopathy   Neurologic:   Cranial nerves 2 - 12 grossly intact, sensation intact, DTR       present and equal bilaterally        Results Review:    Lab Results (last 24 hours)     Procedure Component Value Units Date/Time    POC Glucose Once [[id number]]  (Abnormal) Collected: 09/07/21 1021    Specimen: Blood Updated: 09/07/21 1022     Glucose 145 mg/dL      Comment: Serial Number: [id number]:  [id number]       POC Glucose Once [[id number]]  (Normal) Collected: 09/07/21 0736    Specimen: Blood Updated: 09/07/21 0737     Glucose 105 mg/dL      Comment: Serial Number: [id number]:  [id number]       Manual Differential [[id number]]  (Abnormal) Collected: 09/07/21 0331    Specimen: Blood Updated: 09/07/21 0451     Neutrophil % 77.0 %      Lymphocyte % 9.0 %      Monocyte % 4.0 %      Bands %  10.0 %      Neutrophils Absolute 13.05 10*3/mm3      Lymphocytes Absolute 1.35 10*3/mm3      Monocytes Absolute 0.60 10*3/mm3      RBC Morphology Normal     WBC Morphology Normal     Platelet Estimate Adequate    CBC & Differential [[id number]]  (Abnormal) Collected: 09/07/21 0331    Specimen: Blood Updated: 09/07/21 0451    Narrative:      The following orders were created for panel order CBC & Differential.  Procedure                               Abnormality         Status                     ---------                               -----------         ------                     CBC Auto Differential[[id number]]        Abnormal            Final result               Scan Slide[[id number]]                                       Final result                 Please view results for these tests on the  individual orders.    Scan Slide [[id number]] Collected: 09/07/21 0331    Specimen: Blood Updated: 09/07/21 0451     Scan Slide --     Comment: See Manual Differential Results       CBC Auto Differential [[id number]]  (Abnormal) Collected: 09/07/21 0331    Specimen: Blood Updated: 09/07/21 0451     WBC 15.00 10*3/mm3      RBC 4.12 10*6/mm3      Hemoglobin 12.8 g/dL      Hematocrit 38.3 %      MCV 93.0 fL      MCH 31.1 pg      MCHC 33.5 g/dL      RDW 13.8 %      RDW-SD 43.8 fl      MPV 8.3 fL      Platelets 446 10*3/mm3     Narrative:      The previously reported component NRBC is no longer being reported. Previous result was 0.0 /100 WBC (Reference Range: 0.0-0.2 /100 WBC) on 9/7/2021 at 0359 EDT.    Comprehensive Metabolic Panel [[id number]]  (Abnormal) Collected: 09/07/21 0331    Specimen: Blood Updated: 09/07/21 0441     Glucose 120 mg/dL      BUN 27 mg/dL      Creatinine 0.83 mg/dL      Sodium 136 mmol/L      Potassium 4.3 mmol/L      Chloride 95 mmol/L      CO2 32.0 mmol/L      Calcium 8.7 mg/dL      Total Protein 6.0 g/dL      Albumin 3.40 g/dL      ALT (SGPT) 17 U/L      AST (SGOT) 19 U/L      Alkaline Phosphatase 81 U/L      Total Bilirubin 0.3 mg/dL      eGFR Non African Amer 71 mL/min/1.73      Globulin 2.6 gm/dL      A/G Ratio 1.3 g/dL      BUN/Creatinine Ratio 32.5     Anion Gap 9.0 mmol/L     Narrative:      GFR Normal >60  Chronic Kidney Disease <60  Kidney Failure <15      C-reactive Protein [[id number]]  (Abnormal) Collected: 09/07/21 0331    Specimen: Blood Updated: 09/07/21 0441     C-Reactive Protein 0.93 mg/dL     Bilirubin, Direct [[id number]]  (Normal) Collected: 09/07/21 0331    Specimen: Blood Updated: 09/07/21 0441     Bilirubin, Direct <0.2 mg/dL     Magnesium [[id number]]  (Normal) Collected: 09/07/21 0331    Specimen: Blood Updated: 09/07/21 0441     Magnesium 2.2 mg/dL     D-dimer, Quantitative [[id number]]  (Abnormal) Collected: 09/07/21 0331    Specimen: Blood Updated: 09/07/21 7582      D-Dimer, Quantitative 2.71 mg/L (FEU)     Narrative:      Reference Range  --------------------------------------------------------------------     < 0.50   Negative Predictive Value  0.50-0.59   Indeterminate    >= 0.60   Probable VTE             A very low percentage of patients with DVT may yield D-Dimer results   below the cut-off of 0.50 mg/L FEU.  This is known to be more   prevalent in patients with distal DVT.             Results of this test should always be interpreted in conjunction with   the patient's medical history, clinical presentation and other   findings.  Clinical diagnosis should not be based on the result of   INNOVANCE D-Dimer alone.    POC Glucose Once [[id number]]  (Abnormal) Collected: 09/06/21 1917    Specimen: Blood Updated: 09/06/21 1921     Glucose 152 mg/dL      Comment: Serial Number: [id number]:  [id number]       POC Glucose Once [[id number]]  (Normal) Collected: 09/06/21 1818    Specimen: Blood Updated: 09/06/21 1920     Glucose 105 mg/dL      Comment: Serial Number: [id number]:  [id number]       POC Glucose Once [[id number]]  (Abnormal) Collected: 09/06/21 1039    Specimen: Blood Updated: 09/06/21 1918     Glucose 116 mg/dL      Comment: Serial Number: [id number]:  [id number]       POC Glucose Once [[id number]]  (Normal) Collected: 09/06/21 0738    Specimen: Blood Updated: 09/06/21 1918     Glucose 97 mg/dL      Comment: Serial Number: [id number]:  [id number]              Imaging Results (Last 24 Hours)     ** No results found for the last 24 hours. **               I reviewed the patient's new clinical results.    Medication Review:   Scheduled Meds:Acetylcysteine, 600 mg, Oral, BID  albuterol sulfate HFA, 2 puff, Inhalation, Q4H - RT  ascorbic acid, 500 mg, Oral, Daily  budesonide-formoterol, 2 puff, Inhalation, BID - RT  cefTRIAXone, 1 g, Intravenous, Q24H  cholecalciferol, 1,000 Units, Oral, Daily  dexamethasone, 6 mg, Intravenous, Q12H  dilTIAZem CD, 120  mg, Oral, Q24H  docusate sodium, 100 mg, Oral, BID  FLUoxetine, 40 mg, Oral, Daily  furosemide, 20 mg, Intravenous, Q12H  guaiFENesin, 600 mg, Oral, Q12H  insulin lispro, 0-9 Units, Subcutaneous, TID AC  levothyroxine, 112 mcg, Oral, Q AM  montelukast, 10 mg, Oral, Nightly  pantoprazole, 40 mg, Oral, QAM  polyethylene glycol, 17 g, Oral, Daily  rivaroxaban, 20 mg, Oral, Daily With Dinner  sodium chloride, 3 mL, Intravenous, Q12H  traZODone, 50 mg, Oral, Nightly  zinc sulfate, 220 mg, Oral, Daily      Continuous Infusions:   PRN Meds:.benzonatate  •  dextrose  •  dextrose  •  glucagon (human recombinant)  •  HYDROcod Polst-CPM Polst ER  •  insulin lispro **AND** insulin lispro  •  LORazepam  •  melatonin  •  metoprolol tartrate  •  Morphine  •  ondansetron  •  sodium chloride  •  [COMPLETED] Insert peripheral IV **AND** sodium chloride  •  sodium chloride  •  zolpidem     Assessment/Plan       Pneumonia due to COVID-19 virus    Hypoxia    Atrial fibrillation with RVR (CMS/HCC)    Benign hypertension with CKD (chronic kidney disease) stage III (CMS/HCC)  Obesity  PAF - remains in sinus rhythm, continue Xarelto and diltiazem  Insomnia related to medical condition (steroids) - trazodone; prn Ambien.  H/O DVT - Xarelto  Generalized anxiety disorder - Prozac  Hypothyroidism - levothyroxine    Remdesivir is complete.  Finishing ceftriaxone for secondary pneumonia.     Plan for disposition:TBJACKI Lopez MD  09/07/21  11:58 EDT

## 2021-09-07 NOTE — PLAN OF CARE
Goal Outcome Evaluation:  Plan of Care Reviewed With: patient  Progress: improving  Outcome Summary: Patient ambulated today with physical therapy. Sat in the chair throughout the day. Patient titrated to 10 L humidified high flow. Will continue to titrate for DC. PRN cough medication restarted per pulmonology. Tolerating well at this time. PT rec. Home Health at discharge. Will continue to monitor.    Problem: Adult Inpatient Plan of Care  Goal: Absence of Hospital-Acquired Illness or Injury  Outcome: Ongoing, Progressing  Intervention: Identify and Manage Fall Risk  Recent Flowsheet Documentation  Taken 9/7/2021 1509 by Alycia Sullivan, RN  Safety Promotion/Fall Prevention:   safety round/check completed   nonskid shoes/slippers when out of bed   fall prevention program maintained   clutter free environment maintained   activity supervised  Taken 9/7/2021 1400 by Alycia Sullivan RN  Safety Promotion/Fall Prevention:   safety round/check completed   nonskid shoes/slippers when out of bed   fall prevention program maintained   clutter free environment maintained   assistive device/personal items within reach   activity supervised  Taken 9/7/2021 1210 by Alycia Sullivan RN  Safety Promotion/Fall Prevention:   safety round/check completed   nonskid shoes/slippers when out of bed   fall prevention program maintained   clutter free environment maintained   assistive device/personal items within reach   activity supervised  Taken 9/7/2021 1000 by Alycia Sullivan RN  Safety Promotion/Fall Prevention:   safety round/check completed   nonskid shoes/slippers when out of bed  Taken 9/7/2021 0855 by Alcyia Sullivan RN  Safety Promotion/Fall Prevention:   safety round/check completed   nonskid shoes/slippers when out of bed   fall prevention program maintained   clutter free environment maintained   assistive device/personal items within reach   activity supervised  Intervention: Prevent Skin Injury  Recent Flowsheet  Documentation  Taken 9/7/2021 1210 by Alycia Sullivan RN  Body Position: (back to bed.) other (see comments)  Taken 9/7/2021 0855 by Alycia Sullivan RN  Body Position: position changed independently  Skin Protection:   adhesive use limited   tubing/devices free from skin contact  Intervention: Prevent and Manage VTE (venous thromboembolism) Risk  Recent Flowsheet Documentation  Taken 9/7/2021 0855 by Alycia Sullivan RN  VTE Prevention/Management:   bilateral   sequential compression devices off   bleeding risk factor(s) identified  Intervention: Prevent Infection  Recent Flowsheet Documentation  Taken 9/7/2021 1210 by Alycia Sullivan RN  Infection Prevention:   single patient room provided   hand hygiene promoted   rest/sleep promoted   personal protective equipment utilized  Taken 9/7/2021 1000 by Alycia Sullivan RN  Infection Prevention:   single patient room provided   rest/sleep promoted   personal protective equipment utilized   hand hygiene promoted  Taken 9/7/2021 0855 by Alycia Sullivan RN  Infection Prevention:   single patient room provided   rest/sleep promoted   hand hygiene promoted   personal protective equipment utilized

## 2021-09-07 NOTE — CASE MANAGEMENT/SOCIAL WORK
Continued Stay Note   [place]     Patient Name: Danita Montiel  MRN: [id number]  Today's Date: 9/7/2021    Admit Date: 8/29/2021    Discharge Plan     Row Name 09/07/21 1305       Plan    Plan  pending clinical course- anticipate routine home with family. watch for home oxygen needs. will need walking oximetry at [place] vs [facility]- referral sent 9/7. Will need precert- no PASRR needed for LTACH    Plan Comments   sent referral in [facility] to [facility] for eliza to review and follow in the case that patient needs LTACH bed. DC barriers: PF 25/80, iv abx, iv steroids, iv lasix        Expected Discharge Date and Time     Expected Discharge Date Expected Discharge Time    Sep 10, 2021         Phone communication or documentation only - no physical contact with patient or family.      Ammy Guillen, RN

## 2021-09-07 NOTE — PAYOR COMM NOTE
"Clinical update for case# [id number]    -----------  CONTINUED STAY AUTHORIZATION PENDING:   PLEASE FAX OR CALL DETERMINATION TO CONTACT BELOW:     THANK YOU,    LEONIE Chase, RN  Utilization Review  [facility]  Phone: [phone]  Fax: [phone]      NPI: [id number]  Tax ID: [id number]    Danita Montiel (56 y.o. Female)     Date of Birth Social Security Number Address Home Phone MRN    1965  [id number]  [place] [address] [phone] [id number]    Taoism Marital Status          Holiness        Admission Date Admission Type Admitting Provider Attending Provider Department, Room/Bed    8/29/21 Emergency Korin Lopez MD Lowney, Kay, MD [facility] PROGRESS CARE, 2111/1    Discharge Date Discharge Disposition Discharge Destination                       Attending Provider: Korin Lopez MD    Allergies: Clarithromycin    Isolation: Enh Drop/Con   Infection: COVID (confirmed) (08/29/21)   Code Status: CPR    Ht: 167.6 cm (66\")   Wt: 122 kg (269 lb 10 oz)    Admission Cmt: None   Principal Problem: Pneumonia due to COVID-19 virus [U07.1,J12.82]                 Active Insurance as of 8/29/2021     Primary Coverage     Payor Plan Insurance Group Employer/Plan Group    CIGNA CIGNA [id number]     Payor Plan Address Payor Plan Phone Number Payor Plan Fax Number Effective Dates    [phone]  4/1/2004 - None Entered    [facility] [id number]       Subscriber Name Subscriber Birth Date Member ID       MELODY MONTIEL 2/3/1964 [id number]                 Emergency Contacts      (Rel.) Home Phone Work Phone Mobile Phone    MELODY MONTIEL (Spouse) [phone] -- [phone]            Oxygen Therapy (last day)     Date/Time   SpO2   Device (Oxygen Therapy)   Flow (L/min)   Oxygen Concentration (%)   ETCO2 (mmHg)    09/07/21 0900   95   --   --   --   --    09/07/21 0855   --   --   20   70   --    09/07/21 0721   --   high-flow nasal " cannula;heated;high-flow mask   25   70   --    09/07/21 0525   96   heated;humidified;high-flow nasal cannula   25   70   --    09/07/21 0234   94   heated;high-flow nasal cannula;humidified   25   70   --    09/07/21 0231   94   humidified;heated;high-flow nasal cannula   25   70   --    09/07/21 0214   93   humidified;high-flow nasal cannula   25   70   --    09/07/21 0000   --   humidified;high-flow nasal cannula;heated   25   70   --    09/06/21 2301   91   heated;high-flow nasal cannula;humidified   25   70   --    09/06/21 2258   92   humidified;high-flow nasal cannula;heated   25   70   --    09/06/21 2153   93   humidified;high-flow nasal cannula   25   70   --    09/06/21 2041   91   --   --   --   --    09/06/21 2038   93   humidified;heated;high-flow nasal cannula   25   70   --    09/06/21 2025   96   humidified;high-flow nasal cannula;heated   25   70   --    09/06/21 1835   96   --   --   --   --    09/06/21 1711   97   high-flow nasal cannula;humidified;heated   25   70   --    09/06/21 1500   97   --   --   --   --    09/06/21 1308   95   heated;high-flow nasal cannula;humidified   25   80   --    09/06/21 1200   --   heated;high-flow nasal cannula;humidified   25   80   --    09/06/21 1100   91   --   --   --   --    09/06/21 0911   95   --   25   80   --    09/06/21 0810   --   heated;high-flow nasal cannula   25   85   --    09/06/21 0500   95   heated;high-flow nasal cannula   25   85   --    09/06/21 0400   --   heated;high-flow nasal cannula;humidified   25   85   --    09/06/21 0300   94   heated;high-flow nasal cannula;humidified   25   85   --    09/06/21 0204   96   heated;humidified;high-flow nasal cannula   25   85   --    09/06/21 0000   --   heated;high-flow nasal cannula;humidified   25   85   --              Current Facility-Administered Medications   Medication Dose Route Frequency Provider Last Rate Last Admin   • Acetylcysteine capsule 600 mg  600 mg Oral BID Yissel Gardiner, APRN    600 mg at 09/07/21 0858   • albuterol sulfate HFA (PROVENTIL HFA;VENTOLIN HFA;PROAIR HFA) inhaler 2 puff  2 puff Inhalation Q4H - RT Korin Lopez MD   2 puff at 09/07/21 0720   • ascorbic acid (VITAMIN C) tablet 500 mg  500 mg Oral Daily Korin Lopez MD   500 mg at 09/07/21 0859   • benzonatate (TESSALON) capsule 200 mg  200 mg Oral TID PRN Korin Lopez MD   200 mg at 09/06/21 2155   • budesonide-formoterol (SYMBICORT) 160-4.5 MCG/ACT inhaler 2 puff  2 puff Inhalation BID - RT Mathieu Mars MD   2 puff at 09/07/21 0720   • cefTRIAXone (ROCEPHIN) 1 g in sodium chloride 0.9 % 100 mL IVPB  1 g Intravenous Q24H Hubert Colbert  mL/hr at 09/06/21 1656 1 g at 09/06/21 1656   • cholecalciferol (VITAMIN D3) tablet 1,000 Units  1,000 Units Oral Daily Korin Lopez MD   1,000 Units at 09/07/21 0858   • dexamethasone (DECADRON) injection 6 mg  6 mg Intravenous Q12H Jessica Donis APRN   6 mg at 09/07/21 0902   • dextrose (D50W) 25 g/ 50mL Intravenous Solution 25 g  25 g Intravenous Q15 Min PRN Jessica Donis APRN       • dextrose (GLUTOSE) oral gel 15 g  15 g Oral Q15 Min PRN Jessica Donis APRN       • dilTIAZem CD (CARDIZEM CD) 24 hr capsule 120 mg  120 mg Oral Q24H Brennen Tobar MD   120 mg at 09/07/21 0858   • docusate sodium (COLACE) capsule 100 mg  100 mg Oral BID Shana Diaz MD   100 mg at 09/06/21 0858   • FLUoxetine (PROzac) capsule 40 mg  40 mg Oral Daily Korin Lopez MD   40 mg at 09/07/21 0858   • furosemide (LASIX) injection 20 mg  20 mg Intravenous Q12H Jessica Donis APRN   20 mg at 09/07/21 0901   • glucagon (human recombinant) (GLUCAGEN DIAGNOSTIC) 1 mg in sterile water (preservative free) 1 mL injection  1 mg Subcutaneous Q15 Min PRN Jessica Donis APRN       • guaiFENesin (MUCINEX) 12 hr tablet 600 mg  600 mg Oral Q12H Yissel Gardiner APRN   600 mg at 09/07/21 0858   • HYDROcod Polst-CPM Polst ER (TUSSIONEX PENNKINETIC) 10-8 MG/5ML ER suspension 5 mL  5 mL Oral Q12H  PRN Yissel Gardiner, APRN       • insulin lispro (ADMELOG) injection 0-9 Units  0-9 Units Subcutaneous TID AC Jessica Donis, TOMMY   2 Units at 09/05/21 1353    And   • insulin lispro (ADMELOG) injection 0-9 Units  0-9 Units Subcutaneous PRN Jessica Donis, APRN       • levothyroxine (SYNTHROID, LEVOTHROID) tablet 112 mcg  112 mcg Oral Q AM Korin Lopez MD   112 mcg at 09/07/21 0527   • LORazepam (ATIVAN) tablet 1 mg  1 mg Oral Q6H PRN Hubert Colbert MD   1 mg at 09/04/21 2106   • melatonin tablet 5 mg  5 mg Oral Nightly PRN Korin Lopez MD   5 mg at 09/04/21 2106   • metoprolol tartrate (LOPRESSOR) injection 5 mg  5 mg Intravenous Q4H PRN Korin Lopez MD   5 mg at 09/01/21 0956   • montelukast (SINGULAIR) tablet 10 mg  10 mg Oral Nightly Korin Lopez MD   10 mg at 09/06/21 2027   • Morphine sulfate (PF) injection 2 mg  2 mg Intravenous Q2H PRN Shana Diaz MD   2 mg at 09/04/21 0208   • ondansetron (ZOFRAN) tablet 4 mg  4 mg Oral Q6H PRN Korin Lopez MD   4 mg at 09/05/21 0959   • pantoprazole (PROTONIX) EC tablet 40 mg  40 mg Oral QAM Korin Lopez MD   40 mg at 09/07/21 0858   • polyethylene glycol (MIRALAX) packet 17 g  17 g Oral Daily Shana Diaz MD   17 g at 09/07/21 0858   • rivaroxaban (XARELTO) tablet 20 mg  20 mg Oral Daily With Dinner Korin Lopez MD   20 mg at 09/06/21 1656   • sodium chloride 0.9 % flush 10 mL  10 mL Intravenous PRN Korin Lopez MD       • sodium chloride 0.9 % flush 10 mL  10 mL Intravenous PRN Korin Lopez MD       • sodium chloride 0.9 % flush 3 mL  3 mL Intravenous Q12H Korin Lopez MD   3 mL at 09/06/21 2028   • sodium chloride 0.9 % flush 3-10 mL  3-10 mL Intravenous PRN Korin Lopez MD       • traZODone (DESYREL) tablet 50 mg  50 mg Oral Nightly Korin Lopez MD   50 mg at 09/06/21 2027   • zinc sulfate (ZINCATE) capsule 220 mg  220 mg Oral Daily Korin Lopez MD   220 mg at 09/07/21 0858     Lab Results (last 24 hours)     Procedure Component Value Units Date/Time     POC Glucose Once [[id number]]  (Abnormal) Collected: 09/07/21 1021    Specimen: Blood Updated: 09/07/21 1022     Glucose 145 mg/dL      Comment: Serial Number: [id number]:  [id number]       POC Glucose Once [[id number]]  (Normal) Collected: 09/07/21 0736    Specimen: Blood Updated: 09/07/21 0737     Glucose 105 mg/dL      Comment: Serial Number: [id number]:  [id number]       Manual Differential [[id number]]  (Abnormal) Collected: 09/07/21 0331    Specimen: Blood Updated: 09/07/21 0451     Neutrophil % 77.0 %      Lymphocyte % 9.0 %      Monocyte % 4.0 %      Bands %  10.0 %      Neutrophils Absolute 13.05 10*3/mm3      Lymphocytes Absolute 1.35 10*3/mm3      Monocytes Absolute 0.60 10*3/mm3      RBC Morphology Normal     WBC Morphology Normal     Platelet Estimate Adequate    CBC & Differential [[id number]]  (Abnormal) Collected: 09/07/21 0331    Specimen: Blood Updated: 09/07/21 0451    Narrative:      The following orders were created for panel order CBC & Differential.  Procedure                               Abnormality         Status                     ---------                               -----------         ------                     CBC Auto Differential[[id number]]        Abnormal            Final result               Scan Slide[[id number]]                                       Final result                 Please view results for these tests on the individual orders.    Scan Slide [[id number]] Collected: 09/07/21 0331    Specimen: Blood Updated: 09/07/21 0451     Scan Slide --     Comment: See Manual Differential Results       CBC Auto Differential [[id number]]  (Abnormal) Collected: 09/07/21 0331    Specimen: Blood Updated: 09/07/21 0451     WBC 15.00 10*3/mm3      RBC 4.12 10*6/mm3      Hemoglobin 12.8 g/dL      Hematocrit 38.3 %      MCV 93.0 fL      MCH 31.1 pg      MCHC 33.5 g/dL      RDW 13.8 %      RDW-SD 43.8 fl      MPV 8.3 fL      Platelets 446 10*3/mm3     Narrative:      The previously  reported component NRBC is no longer being reported. Previous result was 0.0 /100 WBC (Reference Range: 0.0-0.2 /100 WBC) on 9/7/2021 at 0359 EDT.    Comprehensive Metabolic Panel [[id number]]  (Abnormal) Collected: 09/07/21 0331    Specimen: Blood Updated: 09/07/21 0441     Glucose 120 mg/dL      BUN 27 mg/dL      Creatinine 0.83 mg/dL      Sodium 136 mmol/L      Potassium 4.3 mmol/L      Chloride 95 mmol/L      CO2 32.0 mmol/L      Calcium 8.7 mg/dL      Total Protein 6.0 g/dL      Albumin 3.40 g/dL      ALT (SGPT) 17 U/L      AST (SGOT) 19 U/L      Alkaline Phosphatase 81 U/L      Total Bilirubin 0.3 mg/dL      eGFR Non African Amer 71 mL/min/1.73      Globulin 2.6 gm/dL      A/G Ratio 1.3 g/dL      BUN/Creatinine Ratio 32.5     Anion Gap 9.0 mmol/L     Narrative:      GFR Normal >60  Chronic Kidney Disease <60  Kidney Failure <15      C-reactive Protein [[id number]]  (Abnormal) Collected: 09/07/21 0331    Specimen: Blood Updated: 09/07/21 0441     C-Reactive Protein 0.93 mg/dL     Bilirubin, Direct [[id number]]  (Normal) Collected: 09/07/21 0331    Specimen: Blood Updated: 09/07/21 0441     Bilirubin, Direct <0.2 mg/dL     Magnesium [[id number]]  (Normal) Collected: 09/07/21 0331    Specimen: Blood Updated: 09/07/21 0441     Magnesium 2.2 mg/dL     D-dimer, Quantitative [[id number]]  (Abnormal) Collected: 09/07/21 0331    Specimen: Blood Updated: 09/07/21 0415     D-Dimer, Quantitative 2.71 mg/L (FEU)     Narrative:      Reference Range  --------------------------------------------------------------------     < 0.50   Negative Predictive Value  0.50-0.59   Indeterminate    >= 0.60   Probable VTE             A very low percentage of patients with DVT may yield D-Dimer results   below the cut-off of 0.50 mg/L FEU.  This is known to be more   prevalent in patients with distal DVT.             Results of this test should always be interpreted in conjunction with   the patient's medical history, clinical presentation and  other   findings.  Clinical diagnosis should not be based on the result of   INNOVANCE D-Dimer alone.    POC Glucose Once [[id number]]  (Abnormal) Collected: 09/06/21 1917    Specimen: Blood Updated: 09/06/21 1921     Glucose 152 mg/dL      Comment: Serial Number: [id number]:  [id number]       POC Glucose Once [[id number]]  (Normal) Collected: 09/06/21 1818    Specimen: Blood Updated: 09/06/21 1920     Glucose 105 mg/dL      Comment: Serial Number: [id number]:  [id number]       POC Glucose Once [[id number]]  (Abnormal) Collected: 09/06/21 1039    Specimen: Blood Updated: 09/06/21 1918     Glucose 116 mg/dL      Comment: Serial Number: [id number]:  [id number]       POC Glucose Once [[id number]]  (Normal) Collected: 09/06/21 0738    Specimen: Blood Updated: 09/06/21 1918     Glucose 97 mg/dL      Comment: Serial Number: [id number]:  [id number]           Imaging Results (Last 24 Hours)     ** No results found for the last 24 hours. **           Physician Progress Notes (last 48 hours) (Notes from 09/05/21 1129 through 09/07/21 1129)      Yissel Gardiner APRN at 09/07/21 0653          Daily Progress Note    Pneumonia due to COVID-19 virus    Hypoxia    Atrial fibrillation with RVR (CMS/HCC)    Benign hypertension with CKD (chronic kidney disease) stage III (CMS/[facility])    Assessment    Acute hypoxic respiratory failure secondary to COVID-19 Pneumonia     9/6 on PF 25/85 plan discussed with patient and ; OOB for every meal use IS which was demonstrated at bedside with pull of 500 mL  9/7 patient OOB this am with PF down 25/70    NEELIMA  Asthma  Hypertension  Hypothyroidism  GERD    Plan    Decadron 6 mg bid  Vit C/Zinc  Antibiotic doxycycline and rocephin  Lasix 20 bid  Add mucomyst/mucinex  OOB for meals encourage IS  Bronchodilator/Inhaled corticosteroids  Gycemic control  Regular diet with supplements for less than 50% eaten at meals  Anticoagulation rivaroxaban    Remdesivir  completed      COVID-19 LAB PANEL     COVID-19 test result  COVID19   Date Value Ref Range Status   08/29/2021 Detected (C) Not Detected - Ref. Range Final       COVID-19 Prognostic lab results  WBC with differential  Results from last 7 days   Lab Units 09/07/21  0331 09/06/21 0217 09/05/21 0223 09/04/21 0312 09/03/21 0241 09/02/21 0227 09/01/21  0327   WBC 10*3/mm3 15.00* 14.70* 15.20* 15.10* 13.90* 11.80* 11.50*   PLATELETS 10*3/mm3 446 408 365 345 324 272 218   NEUTROPHIL % %  --   --   --   --  86.9* 87.0* 81.6*   LYMPHOCYTE % %  --   --   --   --  6.1* 6.7* 8.2*   NEUTROS ABS 10*3/mm3 13.05* 12.94* 13.68* 12.53* 12.10* 10.30* 9.40*   LYMPHS ABS 10*3/mm3 1.35 1.03 0.61* 1.51  --   --   --      Inflammatory markers  Results from last 7 days   Lab Units 09/07/21  0331 09/06/21 0217 09/05/21 0223 09/04/21 0312 09/03/21 0241 09/02/21 0227 09/01/21  0327   CRP mg/dL 0.93* 0.81* 1.18* 1.53* 2.56* 4.51* 10.03*   D DIMER QUANT mg/L (FEU) 2.71* 1.68* 0.96* 0.83* 0.51 0.48 0.53   ALK PHOS U/L 81 70 67 71 62 63 66   AST (SGOT) U/L 19 20 19 22 21 23 30   ALT (SGPT) U/L 17 16 15 16 17 18 21       Poor COVID-19 outcomes associated with:  Neutrophil:Lymphocyte ratio >3.5  Thrombocytopenia  LFT's >5x upper limit of normal  LDH >400   LOS: 9 days       Subjective         Objective     Vital signs for last 24 hours:  Vitals:    09/07/21 0214 09/07/21 0231 09/07/21 0234 09/07/21 0525   BP: 130/59   147/66   BP Location: Right arm   Right arm   Patient Position: Lying   Sitting   Pulse: 61 70 61    Resp: 18 18 18 20   Temp: 97.2 °F (36.2 °C)   97.8 °F (36.6 °C)   TempSrc: Oral   Oral   SpO2: 93% 94% 94% 96%   Weight:       Height:           Intake/Output last 3 shifts:  I/O last 3 completed shifts:  In: 1140 [P.O.:1140]  Out: 3175 [Urine:3175]  Intake/Output this shift:  I/O this shift:  In: -   Out: 1900 [Urine:1900]      Radiology  Imaging Results (Last 24 Hours)     ** No results found for the last 24 hours. **           Labs:  Results from last 7 days   Lab Units 09/07/21  0331   WBC 10*3/mm3 15.00*   HEMOGLOBIN g/dL 12.8   HEMATOCRIT % 38.3   PLATELETS 10*3/mm3 446     Results from last 7 days   Lab Units 09/07/21  0331   SODIUM mmol/L 136   POTASSIUM mmol/L 4.3   CHLORIDE mmol/L 95*   CO2 mmol/L 32.0*   BUN mg/dL 27*   CREATININE mg/dL 0.83   CALCIUM mg/dL 8.7   BILIRUBIN mg/dL 0.3   ALK PHOS U/L 81   ALT (SGPT) U/L 17   AST (SGOT) U/L 19   GLUCOSE mg/dL 120*     Results from last 7 days   Lab Units 09/01/21  0855   PH, ARTERIAL pH units 7.421   PO2 ART mm Hg 50.4*   PCO2, ARTERIAL mm Hg 31.9*   HCO3 ART mmol/L 20.7*     Results from last 7 days   Lab Units 09/07/21  0331 09/06/21  0217 09/05/21  0223   ALBUMIN g/dL 3.40* 3.00* 2.90*             Results from last 7 days   Lab Units 09/07/21  0331   MAGNESIUM mg/dL 2.2                   Meds:   SCHEDULE  Acetylcysteine, 600 mg, Oral, BID  albuterol sulfate HFA, 2 puff, Inhalation, Q4H - RT  ascorbic acid, 500 mg, Oral, Daily  budesonide-formoterol, 2 puff, Inhalation, BID - RT  cefTRIAXone, 1 g, Intravenous, Q24H  cholecalciferol, 1,000 Units, Oral, Daily  dexamethasone, 6 mg, Intravenous, Q12H  dilTIAZem CD, 120 mg, Oral, Q24H  docusate sodium, 100 mg, Oral, BID  FLUoxetine, 40 mg, Oral, Daily  furosemide, 20 mg, Intravenous, Q12H  guaiFENesin, 600 mg, Oral, Q12H  insulin lispro, 0-9 Units, Subcutaneous, TID AC  levothyroxine, 112 mcg, Oral, Q AM  montelukast, 10 mg, Oral, Nightly  pantoprazole, 40 mg, Oral, QAM  polyethylene glycol, 17 g, Oral, Daily  rivaroxaban, 20 mg, Oral, Daily With Dinner  sodium chloride, 3 mL, Intravenous, Q12H  traZODone, 50 mg, Oral, Nightly  zinc sulfate, 220 mg, Oral, Daily      Infusions     PRNs  benzonatate  •  dextrose  •  dextrose  •  glucagon (human recombinant)  •  insulin lispro **AND** insulin lispro  •  LORazepam  •  melatonin  •  metoprolol tartrate  •  Morphine  •  ondansetron  •  sodium chloride  •  [COMPLETED] Insert peripheral IV  **AND** sodium chloride  •  sodium chloride    Physical Exam:  Physical Exam  Vitals reviewed.   Constitutional:       Appearance: She is ill-appearing.   Pulmonary:      Breath sounds: Rhonchi present.   Skin:     General: Skin is warm and dry.   Neurological:      Mental Status: She is alert and oriented to person, place, and time.         ROS  Review of Systems   Constitutional: Positive for activity change, appetite change and fatigue.   Respiratory: Positive for cough and shortness of breath.        I reviewed the patient's new clinical results.      Electronically signed by TOMMY Barrett, 09/07/21 at 06:53 EDT.             Electronically signed by Yissel Gardiner APRN at 09/07/21 1019     Jarrett Lemus MD at 09/06/21 1129          Daily Progress Note    Pneumonia due to COVID-19 virus    Hypoxia    Atrial fibrillation with RVR (CMS/[facility])    Benign hypertension with CKD (chronic kidney disease) stage III (CMS/[facility])    Assessment    Acute hypoxic respiratory failure secondary to COVID-19 Pneumonia     9/6 on PF 25/85 plan discussed with patient and ; OOB for every meal use IS which was demonstrated at bedside with pull of 500 mL    NEELIMA  Asthma  Hypertension  Hypothyroidism  GERD    Plan    Decadron 6 mg bid  Vit C/Zinc  Antibiotic doxycycline and rocephin  Lasix 20 bid  Add mucomyst/mucinex  OOB for meals encourage IS  Bronchodilator/Inhaled corticosteroids  Gycemic control  Regular diet with supplements for less than 50% eaten at meals  Anticoagulation rivaroxaban    Remdesivir completed      COVID-19 LAB PANEL     COVID-19 test result  COVID19   Date Value Ref Range Status   08/29/2021 Detected (C) Not Detected - Ref. Range Final       COVID-19 Prognostic lab results  WBC with differential  Results from last 7 days   Lab Units 09/06/21  0217 09/05/21  0223 09/04/21  0312 09/03/21  0241 09/02/21  0227 09/01/21  0327 08/31/21  0420   WBC 10*3/mm3 14.70* 15.20* 15.10* 13.90* 11.80* 11.50* 9.00    PLATELETS 10*3/mm3 408 365 345 324 272 218 190   NEUTROPHIL % %  --   --   --  86.9* 87.0* 81.6* 81.2*   LYMPHOCYTE % %  --   --   --  6.1* 6.7* 8.2* 8.9*   NEUTROS ABS 10*3/mm3 12.94* 13.68* 12.53* 12.10* 10.30* 9.40* 7.30*   LYMPHS ABS 10*3/mm3 1.03 0.61* 1.51  --   --   --   --      Inflammatory markers  Results from last 7 days   Lab Units 09/06/21  0217 09/05/21  0223 09/04/21  0312 09/03/21  0241 09/02/21  0227 09/01/21  0327 08/31/21  0420   CRP mg/dL 0.81* 1.18* 1.53* 2.56* 4.51* 10.03* 19.49*   D DIMER QUANT mg/L (FEU) 1.68* 0.96* 0.83* 0.51 0.48 0.53 0.58   ALK PHOS U/L 70 67 71 62 63 66 72   AST (SGOT) U/L 20 19 22 21 23 30 38*   ALT (SGPT) U/L 16 15 16 17 18 21 23       Poor COVID-19 outcomes associated with:  Neutrophil:Lymphocyte ratio >3.5  Thrombocytopenia  LFT's >5x upper limit of normal  LDH >400   LOS: 8 days       Subjective         Objective     Vital signs for last 24 hours:  Vitals:    09/06/21 0204 09/06/21 0300 09/06/21 0500 09/06/21 0911   BP: 135/67  127/73    BP Location: Right arm      Patient Position: Lying      Pulse: 65 61 61    Resp: 20  22    Temp: 96.9 °F (36.1 °C)  96.9 °F (36.1 °C)    TempSrc: Oral  Axillary    SpO2: 96% 94% 95% 95%   Weight:   122 kg (269 lb 10 oz)    Height:           Intake/Output last 3 shifts:  I/O last 3 completed shifts:  In: 540 [P.O.:540]  Out: 4825 [Urine:4825]  Intake/Output this shift:  I/O this shift:  In: 240 [P.O.:240]  Out: -       Radiology  Imaging Results (Last 24 Hours)     ** No results found for the last 24 hours. **          Labs:  Results from last 7 days   Lab Units 09/06/21  0217   WBC 10*3/mm3 14.70*   HEMOGLOBIN g/dL 12.0   HEMATOCRIT % 36.1   PLATELETS 10*3/mm3 408     Results from last 7 days   Lab Units 09/06/21 0217   SODIUM mmol/L 135*   POTASSIUM mmol/L 5.5*   CHLORIDE mmol/L 95*   CO2 mmol/L 30.0*   BUN mg/dL 30*   CREATININE mg/dL 0.91   CALCIUM mg/dL 8.6   BILIRUBIN mg/dL 0.3   ALK PHOS U/L 70   ALT (SGPT) U/L 16   AST  (SGOT) U/L 20   GLUCOSE mg/dL 116*     Results from last 7 days   Lab Units 09/01/21  0855   PH, ARTERIAL pH units 7.421   PO2 ART mm Hg 50.4*   PCO2, ARTERIAL mm Hg 31.9*   HCO3 ART mmol/L 20.7*     Results from last 7 days   Lab Units 09/06/21  0217 09/05/21  0223 09/04/21  0312   ALBUMIN g/dL 3.00* 2.90* 3.10*                               Meds:   SCHEDULE  albuterol sulfate HFA, 2 puff, Inhalation, Q4H - RT  ascorbic acid, 500 mg, Oral, Daily  budesonide-formoterol, 2 puff, Inhalation, BID - RT  cefTRIAXone, 1 g, Intravenous, Q24H  cholecalciferol, 1,000 Units, Oral, Daily  dexamethasone, 6 mg, Intravenous, Q12H  dilTIAZem CD, 120 mg, Oral, Q24H  docusate sodium, 100 mg, Oral, BID  FLUoxetine, 40 mg, Oral, Daily  furosemide, 20 mg, Intravenous, Q12H  insulin lispro, 0-9 Units, Subcutaneous, TID AC  levothyroxine, 112 mcg, Oral, Q AM  montelukast, 10 mg, Oral, Nightly  pantoprazole, 40 mg, Oral, QAM  polyethylene glycol, 17 g, Oral, Daily  rivaroxaban, 20 mg, Oral, Daily With Dinner  sodium chloride, 3 mL, Intravenous, Q12H  traZODone, 50 mg, Oral, Nightly  zinc sulfate, 220 mg, Oral, Daily      Infusions     PRNs  benzonatate  •  dextrose  •  dextrose  •  glucagon (human recombinant)  •  HYDROcod Polst-CPM Polst ER  •  insulin lispro **AND** insulin lispro  •  LORazepam  •  melatonin  •  metoprolol tartrate  •  Morphine  •  ondansetron  •  sodium chloride  •  [COMPLETED] Insert peripheral IV **AND** sodium chloride  •  sodium chloride    Physical Exam:  Physical Exam  Vitals reviewed.   Constitutional:       Appearance: She is ill-appearing.   Pulmonary:      Breath sounds: Rhonchi present.   Skin:     General: Skin is warm and dry.   Neurological:      Mental Status: She is alert and oriented to person, place, and time.         ROS  Review of Systems   Constitutional: Positive for activity change, appetite change and fatigue.   Respiratory: Positive for cough and shortness of breath.        I reviewed the  patient's new clinical results.      Electronically signed by TOMMY Barrett, 09/06/21 at 11:29 EDT.             Electronically signed by Jarrett Lemus MD at 09/07/21 0741     ChemFrederic aquino MD at 09/06/21 1027              Reason for follow-up: Atrial fibrillation     Patient Care Team:  Jaya Harper MD as PCP - General  Grief, Jaya RODRIGUEZ MD as PCP - Family Medicine    Subjective .   Doing well this morning no complaints     ROS    Clarithromycin    Scheduled Meds:albuterol sulfate HFA, 2 puff, Inhalation, Q4H - RT  ascorbic acid, 500 mg, Oral, Daily  budesonide-formoterol, 2 puff, Inhalation, BID - RT  cefTRIAXone, 1 g, Intravenous, Q24H  cholecalciferol, 1,000 Units, Oral, Daily  dexamethasone, 6 mg, Intravenous, Q12H  dilTIAZem CD, 120 mg, Oral, Q24H  docusate sodium, 100 mg, Oral, BID  FLUoxetine, 40 mg, Oral, Daily  furosemide, 20 mg, Intravenous, Q12H  insulin lispro, 0-9 Units, Subcutaneous, TID AC  levothyroxine, 112 mcg, Oral, Q AM  montelukast, 10 mg, Oral, Nightly  pantoprazole, 40 mg, Oral, QAM  polyethylene glycol, 17 g, Oral, Daily  rivaroxaban, 20 mg, Oral, Daily With Dinner  sodium chloride, 3 mL, Intravenous, Q12H  traZODone, 50 mg, Oral, Nightly  zinc sulfate, 220 mg, Oral, Daily      Continuous Infusions:   PRN Meds:.benzonatate  •  dextrose  •  dextrose  •  glucagon (human recombinant)  •  HYDROcod Polst-CPM Polst ER  •  insulin lispro **AND** insulin lispro  •  LORazepam  •  melatonin  •  metoprolol tartrate  •  Morphine  •  ondansetron  •  sodium chloride  •  [COMPLETED] Insert peripheral IV **AND** sodium chloride  •  sodium chloride      VITAL SIGNS  Vitals:    09/06/21 0204 09/06/21 0300 09/06/21 0500 09/06/21 0911   BP: 135/67  127/73    BP Location: Right arm      Patient Position: Lying      Pulse: 65 61 61    Resp: 20  22    Temp: 96.9 °F (36.1 °C)  96.9 °F (36.1 °C)    TempSrc: Oral  Axillary    SpO2: 96% 94% 95% 95%   Weight:   122 kg (269 lb 10 oz)    Height:      "      Flowsheet Rows      First Filed Value   Admission Height  167.6 cm (66\") Documented at 08/29/2021 1152   Admission Weight  118 kg (259 lb 11.2 oz) Documented at 08/29/2021 1152           TELEMETRY: Sinus rhythm    Physical Exam  VITALS REVIEWED    Physical exam was not performed due to Covid positive status, patient however did not seem to be in any distress.    LAB RESULTS (LAST 7 DAYS)    CBC  Results from last 7 days   Lab Units 09/06/21 0217 09/05/21 0223 09/04/21 0312 09/03/21 0241 09/02/21 0227 09/01/21 0327 08/31/21  0420   WBC 10*3/mm3 14.70* 15.20* 15.10* 13.90* 11.80* 11.50* 9.00   RBC 10*6/mm3 3.88 3.84 3.81 3.84 3.97 4.16 4.30   HEMOGLOBIN g/dL 12.0 11.9* 12.1 11.8* 12.6 13.4 13.7   HEMATOCRIT % 36.1 35.9 35.6 34.9 37.2 38.9 40.4   MCV fL 93.0 93.5 93.2 90.9 93.6 93.6 93.9   PLATELETS 10*3/mm3 408 365 345 324 272 218 190       BMP  Results from last 7 days   Lab Units 09/06/21 0217 09/05/21 0223 09/04/21 0312 09/03/21 0241 09/02/21 0227 09/01/21 0327 08/31/21  0420   SODIUM mmol/L 135* 131* 132* 134* 133* 133* 134*   POTASSIUM mmol/L 5.5* 4.3 4.2 3.8 4.1 4.3 4.1   CHLORIDE mmol/L 95* 94* 96* 98 97* 99 99   CO2 mmol/L 30.0* 26.0 26.0 24.0 24.0 23.0 22.0   BUN mg/dL 30* 34* 31* 32* 38* 36* 27*   CREATININE mg/dL 0.91 0.98 0.97 0.96 1.07* 1.09* 1.13*   GLUCOSE mg/dL 116* 145* 139* 145* 153* 148* 152*       CMP   Results from last 7 days   Lab Units 09/06/21  0217 09/05/21  0223 09/04/21  0312 09/03/21  0241 09/02/21  0227 09/01/21  0327 08/31/21  0420   SODIUM mmol/L 135* 131* 132* 134* 133* 133* 134*   POTASSIUM mmol/L 5.5* 4.3 4.2 3.8 4.1 4.3 4.1   CHLORIDE mmol/L 95* 94* 96* 98 97* 99 99   CO2 mmol/L 30.0* 26.0 26.0 24.0 24.0 23.0 22.0   BUN mg/dL 30* 34* 31* 32* 38* 36* 27*   CREATININE mg/dL 0.91 0.98 0.97 0.96 1.07* 1.09* 1.13*   GLUCOSE mg/dL 116* 145* 139* 145* 153* 148* 152*   ALBUMIN g/dL 3.00* 2.90* 3.10* 3.10* 3.10* 3.20* 3.40*   BILIRUBIN mg/dL 0.3 0.3 0.3 0.3 0.3 0.3 0.3 "   ALK PHOS U/L 70 67 71 62 63 66 72   AST (SGOT) U/L 20 19 22 21 23 30 38*   ALT (SGPT) U/L 16 15 16 17 18 21 23         ABG  Results from last 7 days   Lab Units 09/01/21  0855   PH, ARTERIAL pH units 7.421   PCO2, ARTERIAL mm Hg 31.9*   PO2 ART mm Hg 50.4*   O2 SATURATION ART % 86.5*   BASE EXCESS ART mmol/L -2.7*           Assessment/Plan       Pneumonia due to COVID-19 virus    Hypoxia    Atrial fibrillation with RVR (CMS/[facility])    Benign hypertension with CKD (chronic kidney disease) stage III (CMS/[facility])      Ms. Montiel is a 56-year-old female patient who was admitted for pneumonia due to COVID-19 infection.  On admission she was in sinus rhythm but developed new onset atrial fibrillation with rapid ventricular rate.  She was started on rate control therapy with Cardizem and as needed metoprolol.  Also placed on Xarelto for stroke prevention.  Patient spontaneously converted back to sinus rhythm and and remains in sinus rhythm. Continue p.o. Cardizem and Xarelto for now.  No change in cardiac status since yesterday.    Frederic Garcia MD  09/06/21  10:27 EDT                Electronically signed by Frederic Garcia MD at 09/06/21 1030     Jessica Donis APRN at 09/06/21 0840           LOS: 8 days   Patient Care Team:  Jaya Harper MD as PCP - General  Grief, Jaya RODRIGUEZ MD as PCP - Family Medicine    Subjective     Interval History:    Patient Complaints:  SOA persists- but improved     History taken from: patient    Review of Systems   Constitutional: Positive for activity change and fatigue. Negative for appetite change and fever.   HENT: Negative for trouble swallowing.    Eyes: Negative for visual disturbance.   Respiratory: Positive for cough, shortness of breath and wheezing.    Cardiovascular: Negative for chest pain, palpitations and leg swelling.   Gastrointestinal: Positive for abdominal pain (RUQ_ improved). Negative for constipation and diarrhea.   Genitourinary: Negative for difficulty  urinating.   Musculoskeletal: Positive for back pain.   Neurological: Positive for weakness.   Psychiatric/Behavioral: Negative for confusion.           Objective     Vital Signs  Temp:  [96.9 °F (36.1 °C)-98.1 °F (36.7 °C)] 96.9 °F (36.1 °C)  Heart Rate:  [61-85] 61  Resp:  [20-24] 22  BP: (121-137)/(50-73) 127/73    Physical Exam:     General Appearance:    Alert, cooperative, NAD   Head:    Normocephalic, without obvious abnormality, atraumatic   Eyes:            Lids and lashes normal, conjunctivae and sclerae normal, no   icterus, no pallor, corneas clear, PERRLA   Ears:    Ears appear intact with no abnormalities noted   Throat:   No oral lesions, no thrush, oral mucosa moist   Neck:   No adenopathy, supple, trachea midline, no thyromegaly, no   carotid bruit, no JVD   Lungs:     Rhonchi throughout.     Heart:    Regular rhythm and normal rate, normal S1 and S2, no            murmur, no gallop, no rub, no click   Chest Wall:    No abnormalities observed   Abdomen:     Normal bowel sounds, no masses, no organomegaly, soft        Non-tender non-distended, no guarding,   Extremities:   Moves all extremities well, no edema, no cyanosis, no             Redness   Pulses:   Pulses palpable and equal bilaterally   Skin:   No bleeding, bruising or rash   Lymph nodes:   No palpable adenopathy   Neurologic:   Cranial nerves 2 - 12 grossly intact, sensation intact, DTR       present and equal bilaterally        Results Review:    Lab Results (last 24 hours)     Procedure Component Value Units Date/Time    Manual Differential [[id number]]  (Abnormal) Collected: 09/06/21 0217    Specimen: Blood Updated: 09/06/21 0629     Neutrophil % 83.0 %      Lymphocyte % 6.0 %      Eosinophil % 1.0 %      Bands %  5.0 %      Metamyelocyte % 3.0 %      Myelocyte % 1.0 %      Atypical Lymphocyte % 1.0 %      Neutrophils Absolute 12.94 10*3/mm3      Lymphocytes Absolute 1.03 10*3/mm3      Eosinophils Absolute 0.15 10*3/mm3      RBC  Morphology Normal     Vacuolated Neutrophils Slight/1+     Platelet Morphology Normal    CBC & Differential [[id number]]  (Abnormal) Collected: 09/06/21 0217    Specimen: Blood Updated: 09/06/21 0629    Narrative:      The following orders were created for panel order CBC & Differential.  Procedure                               Abnormality         Status                     ---------                               -----------         ------                     CBC Auto Differential[[id number]]        Abnormal            Final result               Scan Slide[[id number]]                                       Final result                 Please view results for these tests on the individual orders.    Scan Slide [[id number]] Collected: 09/06/21 0217    Specimen: Blood Updated: 09/06/21 0629     Scan Slide --     Comment: See Manual Differential Results       CBC Auto Differential [[id number]]  (Abnormal) Collected: 09/06/21 0217    Specimen: Blood Updated: 09/06/21 0629     WBC 14.70 10*3/mm3      RBC 3.88 10*6/mm3      Hemoglobin 12.0 g/dL      Hematocrit 36.1 %      MCV 93.0 fL      MCH 30.9 pg      MCHC 33.3 g/dL      RDW 14.1 %      RDW-SD 45.5 fl      MPV 8.3 fL      Platelets 408 10*3/mm3     Narrative:      The previously reported component NRBC is no longer being reported. Previous result was 0.0 /100 WBC (Reference Range: 0.0-0.2 /100 WBC) on 9/6/2021 at 0315 EDT.    Comprehensive Metabolic Panel [[id number]]  (Abnormal) Collected: 09/06/21 0217    Specimen: Blood Updated: 09/06/21 0413     Glucose 116 mg/dL      BUN 30 mg/dL      Creatinine 0.91 mg/dL      Sodium 135 mmol/L      Potassium 5.5 mmol/L      Comment: Result checked         Chloride 95 mmol/L      CO2 30.0 mmol/L      Calcium 8.6 mg/dL      Total Protein 5.7 g/dL      Albumin 3.00 g/dL      ALT (SGPT) 16 U/L      AST (SGOT) 20 U/L      Alkaline Phosphatase 70 U/L      Total Bilirubin 0.3 mg/dL      eGFR Non African Amer 63 mL/min/1.73      Globulin 2.7  gm/dL      A/G Ratio 1.1 g/dL      BUN/Creatinine Ratio 33.0     Anion Gap 10.0 mmol/L     Narrative:      GFR Normal >60  Chronic Kidney Disease <60  Kidney Failure <15      Bilirubin, Direct [[id number]]  (Normal) Collected: 09/06/21 0217    Specimen: Blood Updated: 09/06/21 0402     Bilirubin, Direct <0.2 mg/dL     C-reactive Protein [[id number]]  (Abnormal) Collected: 09/06/21 0217    Specimen: Blood Updated: 09/06/21 0402     C-Reactive Protein 0.81 mg/dL     D-dimer, Quantitative [[id number]]  (Abnormal) Collected: 09/06/21 0217    Specimen: Blood Updated: 09/06/21 0338     D-Dimer, Quantitative 1.68 mg/L (FEU)     Narrative:      Reference Range  --------------------------------------------------------------------     < 0.50   Negative Predictive Value  0.50-0.59   Indeterminate    >= 0.60   Probable VTE             A very low percentage of patients with DVT may yield D-Dimer results   below the cut-off of 0.50 mg/L FEU.  This is known to be more   prevalent in patients with distal DVT.             Results of this test should always be interpreted in conjunction with   the patient's medical history, clinical presentation and other   findings.  Clinical diagnosis should not be based on the result of   IN[place]OV[place] D-Dimer alone.    POC Glucose Once [[id number]]  (Abnormal) Collected: 09/05/21 2041    Specimen: Blood Updated: 09/05/21 2042     Glucose 123 mg/dL      Comment: Serial Number: [id number]:  [id number]       POC Glucose Once [[id number]]  (Abnormal) Collected: 09/05/21 1649    Specimen: Blood Updated: 09/05/21 1650     Glucose 139 mg/dL      Comment: Serial Number: [id number]:  [id number]              Imaging Results (Last 24 Hours)     ** No results found for the last 24 hours. **               I reviewed the patient's new clinical results.    Medication Review:   Scheduled Meds:albuterol sulfate HFA, 2 puff, Inhalation, Q4H - RT  ascorbic acid, 500 mg, Oral, Daily  budesonide-formoterol, 2  puff, Inhalation, BID - RT  cefTRIAXone, 1 g, Intravenous, Q24H  cholecalciferol, 1,000 Units, Oral, Daily  dexamethasone, 6 mg, Intravenous, Q12H  dilTIAZem CD, 120 mg, Oral, Q24H  docusate sodium, 100 mg, Oral, BID  doxycycline, 100 mg, Oral, Q12H  FLUoxetine, 40 mg, Oral, Daily  furosemide, 20 mg, Intravenous, Q12H  insulin lispro, 0-9 Units, Subcutaneous, TID AC  levothyroxine, 112 mcg, Oral, Q AM  montelukast, 10 mg, Oral, Nightly  pantoprazole, 40 mg, Oral, QAM  polyethylene glycol, 17 g, Oral, Daily  rivaroxaban, 20 mg, Oral, Daily With Dinner  sodium chloride, 3 mL, Intravenous, Q12H  sodium polystyrene, 15 g, Oral, Once  traZODone, 50 mg, Oral, Nightly  zinc sulfate, 220 mg, Oral, Daily      Continuous Infusions:   PRN Meds:.benzonatate  •  dextrose  •  dextrose  •  glucagon (human recombinant)  •  HYDROcod Polst-CPM Polst ER  •  insulin lispro **AND** insulin lispro  •  LORazepam  •  melatonin  •  metoprolol tartrate  •  Morphine  •  ondansetron  •  sodium chloride  •  [COMPLETED] Insert peripheral IV **AND** sodium chloride  •  sodium chloride     Assessment/Plan       Pneumonia due to COVID-19 virus    Hypoxia    Atrial fibrillation with RVR (CMS/[facility]CC)    Benign hypertension with CKD (chronic kidney disease) stage III (CMS/[facility])    Pneumonia due to COVID 19- oxygen demand improved. Presently at 25L/ 85%- sats 92-95.     Afib - converted spontaneoulsy back to SR - continue on po cardizem     Hyperkalemia- Kayexalate 15 Gm po today - monitor labs     Plan for disposition: TBD  TOMMY Barrett  21  08:40 EDT          Electronically signed by Jessica Donis APRN at 21 0845     Mathieu Mars MD at 21 1154          PULMONARY CRITICAL CARE [facility]  NOTE      PATIENT IDENTIFICATION:  Name: Danita Montiel  MRN: [id number]  :  1965     Age: 56 y.o.  Sex: female    DATE OF Note:  2021   Referring Physician: Korin Lopez MD                  Subjective:   Still on PF 40/100,  "states she feels a little better,  no chest or abd pain, no bowel or bladder issues    Objective:  tMax 24 hrs: Temp (24hrs), Av.5 °F (36.4 °C), Min:96.1 °F (35.6 °C), Max:98.7 °F (37.1 °C)      Vitals Ranges:   Temp:  [96.1 °F (35.6 °C)-98.7 °F (37.1 °C)] 96.8 °F (36 °C)  Heart Rate:  [63-87] 74  Resp:  [18-24] 20  BP: (117-141)/(54-68) 134/64    Intake and Output Last 3 Shifts:   I/O last 3 completed shifts:  In: 600 [P.O.:600]  Out: 3500 [Urine:3500]    Exam:  /64   Pulse 74   Temp 96.8 °F (36 °C) (Axillary)   Resp 20   Ht 167.6 cm (66\")   Wt 121 kg (266 lb 8.6 oz)   LMP  (LMP Unknown)   SpO2 92%   BMI 43.02 kg/m²     General Appearance: Alert    HEENT:  Normocephalic, without obvious abnormality, Conjunctiva/corneas clear,.  Normal external ear canals, Nares normal, no drainage     Neck:  Supple, symmetrical, trachea midline. No JVD.  Lungs /Chest wall:   Bilateral basal rhonchi, respirations unlabored symmetrical wall movement.     Heart:  Regular rate and rhythm, systolic murmur PMI left sternal border  Abdomen: Soft, non-tender, no masses, no organomegaly.    Extremities: Trace edema no clubbing or Cyanosis        Medications:    Current Facility-Administered Medications:   •  albuterol sulfate HFA (PROVENTIL HFA;VENTOLIN HFA;PROAIR HFA) inhaler 2 puff, 2 puff, Inhalation, Q4H - RT, Korin Lopez MD, 2 puff at 21 1104  •  ascorbic acid (VITAMIN C) tablet 500 mg, 500 mg, Oral, Daily, Korin Lopez MD, 500 mg at 21 0859  •  benzonatate (TESSALON) capsule 200 mg, 200 mg, Oral, TID PRN, Korin Lopez MD, 200 mg at 21 2106  •  budesonide-formoterol (SYMBICORT) 160-4.5 MCG/ACT inhaler 2 puff, 2 puff, Inhalation, BID - RT, Mathieu Mars MD, 2 puff at 21 0701  •  cefTRIAXone (ROCEPHIN) 1 g in sodium chloride 0.9 % 100 mL IVPB, 1 g, Intravenous, Q24H, Hubert Colbert MD, Last Rate: 200 mL/hr at 21 1319, 1 g at 21 1319  •  cholecalciferol (VITAMIN D3) tablet 1,000 " Units, 1,000 Units, Oral, Daily, Korin Lopez MD, 1,000 Units at 09/05/21 0859  •  dexamethasone (DECADRON) injection 6 mg, 6 mg, Intravenous, Q12H, Jessica Donis APRN, 6 mg at 09/05/21 0903  •  dextrose (D50W) 25 g/ 50mL Intravenous Solution 25 g, 25 g, Intravenous, Q15 Min PRN, Jessica Donis APRN  •  dextrose (GLUTOSE) oral gel 15 g, 15 g, Oral, Q15 Min PRN, Jessica Donis APRMIKE  •  dilTIAZem CD (CARDIZEM CD) 24 hr capsule 120 mg, 120 mg, Oral, Q24H, Brennen Tobar MD, 120 mg at 09/05/21 0859  •  docusate sodium (COLACE) capsule 100 mg, 100 mg, Oral, BID, Shana Diaz MD, 100 mg at 09/05/21 0859  •  doxycycline (ADOXA) tablet 100 mg, 100 mg, Oral, Q12H, Korin Lopez MD, 100 mg at 09/05/21 0859  •  FLUoxetine (PROzac) capsule 40 mg, 40 mg, Oral, Daily, Korin Lopez MD, 40 mg at 09/05/21 0859  •  furosemide (LASIX) injection 20 mg, 20 mg, Intravenous, Q12H, Jessica Donis APRN, 20 mg at 09/05/21 0959  •  glucagon (human recombinant) (GLUCAGEN DIAGNOSTIC) 1 mg in sterile water (preservative free) 1 mL injection, 1 mg, Subcutaneous, Q15 Min PRN, Jessica Donis APRN  •  HYDROcod Polst-CPM Polst ER (TUSSIONEX PENNKINETIC) 10-8 MG/5ML ER suspension 5 mL, 5 mL, Oral, Q12H PRN, Mathieu Mars MD, 5 mL at 09/05/21 0735  •  insulin lispro (ADMELOG) injection 0-9 Units, 0-9 Units, Subcutaneous, TID AC, 2 Units at 09/04/21 1843 **AND** insulin lispro (ADMELOG) injection 0-9 Units, 0-9 Units, Subcutaneous, PRN, Jessica Donis, TOMMY  •  levothyroxine (SYNTHROID, LEVOTHROID) tablet 112 mcg, 112 mcg, Oral, Q AM, Korin Lopez MD, 112 mcg at 09/05/21 0642  •  LORazepam (ATIVAN) tablet 1 mg, 1 mg, Oral, Q6H PRN, Hubert Colbert MD, 1 mg at 09/04/21 2106  •  melatonin tablet 5 mg, 5 mg, Oral, Nightly PRN, Korin Lopez MD, 5 mg at 09/04/21 2106  •  metoprolol tartrate (LOPRESSOR) injection 5 mg, 5 mg, Intravenous, Q4H PRN, Korin Lopez MD, 5 mg at 09/01/21 0956  •  montelukast (SINGULAIR) tablet 10 mg,  10 mg, Oral, Nightly, Korin Lopez MD, 10 mg at 09/04/21 2105  •  Morphine sulfate (PF) injection 2 mg, 2 mg, Intravenous, Q2H PRN, Shana Diaz MD, 2 mg at 09/04/21 0208  •  ondansetron (ZOFRAN) tablet 4 mg, 4 mg, Oral, Q6H PRN, Korin Lopez MD, 4 mg at 09/05/21 0959  •  pantoprazole (PROTONIX) EC tablet 40 mg, 40 mg, Oral, QAM, Korin Lopez MD, 40 mg at 09/05/21 0642  •  polyethylene glycol (MIRALAX) packet 17 g, 17 g, Oral, Daily, Shana Diaz MD, 17 g at 09/05/21 0859  •  rivaroxaban (XARELTO) tablet 20 mg, 20 mg, Oral, Daily With Dinner, Korin Lopez MD, 20 mg at 09/04/21 1843  •  sodium chloride 0.9 % flush 10 mL, 10 mL, Intravenous, PRN, Korin Lopez MD  •  [COMPLETED] Insert peripheral IV, , , Once **AND** sodium chloride 0.9 % flush 10 mL, 10 mL, Intravenous, PRN, Korin Lopez MD  •  sodium chloride 0.9 % flush 3 mL, 3 mL, Intravenous, Q12H, Korin Lopez MD, 3 mL at 09/05/21 0859  •  sodium chloride 0.9 % flush 3-10 mL, 3-10 mL, Intravenous, PRN, Korin Lopez MD  •  traZODone (DESYREL) tablet 50 mg, 50 mg, Oral, Nightly, Korin Lopez MD, 50 mg at 09/04/21 2106  •  zinc sulfate (ZINCATE) capsule 220 mg, 220 mg, Oral, Daily, Korin Lopez MD, 220 mg at 09/05/21 0859    Data Review:  All labs (24hrs):   Recent Results (from the past 24 hour(s))   POC Glucose Once    Collection Time: 09/04/21  4:23 PM    Specimen: Blood   Result Value Ref Range    Glucose 166 (H) 70 - 105 mg/dL   Comprehensive Metabolic Panel    Collection Time: 09/05/21  2:23 AM    Specimen: Blood   Result Value Ref Range    Glucose 145 (H) 65 - 99 mg/dL    BUN 34 (H) 6 - 20 mg/dL    Creatinine 0.98 0.57 - 1.00 mg/dL    Sodium 131 (L) 136 - 145 mmol/L    Potassium 4.3 3.5 - 5.2 mmol/L    Chloride 94 (L) 98 - 107 mmol/L    CO2 26.0 22.0 - 29.0 mmol/L    Calcium 8.1 (L) 8.6 - 10.5 mg/dL    Total Protein 5.7 (L) 6.0 - 8.5 g/dL    Albumin 2.90 (L) 3.50 - 5.20 g/dL    ALT (SGPT) 15 1 - 33 U/L    AST (SGOT) 19 1 - 32 U/L    Alkaline  Phosphatase 67 39 - 117 U/L    Total Bilirubin 0.3 0.0 - 1.2 mg/dL    eGFR Non African Amer 58 (L) >60 mL/min/1.73    Globulin 2.8 gm/dL    A/G Ratio 1.0 g/dL    BUN/Creatinine Ratio 34.7 (H) 7.0 - 25.0    Anion Gap 11.0 5.0 - 15.0 mmol/L   C-reactive Protein    Collection Time: 09/05/21  2:23 AM    Specimen: Blood   Result Value Ref Range    C-Reactive Protein 1.18 (H) 0.00 - 0.50 mg/dL   D-dimer, Quantitative    Collection Time: 09/05/21  2:23 AM    Specimen: Blood   Result Value Ref Range    D-Dimer, Quantitative 0.96 (H) 0.00 - 0.59 mg/L (FEU)   CBC Auto Differential    Collection Time: 09/05/21  2:23 AM    Specimen: Blood   Result Value Ref Range    WBC 15.20 (H) 3.40 - 10.80 10*3/mm3    RBC 3.84 3.77 - 5.28 10*6/mm3    Hemoglobin 11.9 (L) 12.0 - 15.9 g/dL    Hematocrit 35.9 34.0 - 46.6 %    MCV 93.5 79.0 - 97.0 fL    MCH 31.0 26.6 - 33.0 pg    MCHC 33.2 31.5 - 35.7 g/dL    RDW 14.0 12.3 - 15.4 %    RDW-SD 45.9 37.0 - 54.0 fl    MPV 8.6 6.0 - 12.0 fL    Platelets 365 140 - 450 10*3/mm3   Bilirubin, Direct    Collection Time: 09/05/21  2:23 AM    Specimen: Blood   Result Value Ref Range    Bilirubin, Direct <0.2 0.0 - 0.3 mg/dL   Scan Slide    Collection Time: 09/05/21  2:23 AM    Specimen: Blood   Result Value Ref Range    Scan Slide     Manual Differential    Collection Time: 09/05/21  2:23 AM    Specimen: Blood   Result Value Ref Range    Neutrophil % 79.0 (H) 42.7 - 76.0 %    Lymphocyte % 4.0 (L) 19.6 - 45.3 %    Monocyte % 4.0 (L) 5.0 - 12.0 %    Bands %  11.0 (H) 0.0 - 5.0 %    Metamyelocyte % 2.0 (H) 0.0 - 0.0 %    Neutrophils Absolute 13.68 (H) 1.70 - 7.00 10*3/mm3    Lymphocytes Absolute 0.61 (L) 0.70 - 3.10 10*3/mm3    Monocytes Absolute 0.61 0.10 - 0.90 10*3/mm3    nRBC 1.0 (H) 0.0 - 0.2 /100 WBC    RBC Morphology Normal Normal    WBC Morphology Normal Normal    Platelet Estimate Adequate Normal    Large Platelets Slight/1+ None Seen    Giant Platelets Slight/1+ None Seen   POC Glucose Once     Collection Time: 09/05/21  7:10 AM    Specimen: Blood   Result Value Ref Range    Glucose 128 (H) 70 - 105 mg/dL        Imaging:  XR Abdomen KUB  Narrative: DATE OF EXAM:  9/2/2021 11:04 PM     PROCEDURE:  XR ABDOMEN KUB-     INDICATIONS:  RUQ pain; R06.00-Dyspnea, unspecified; U07.1-COVID-19; U07.1-COVID-19;  J12.82-Pneumonia due to Coronavirus disease 2019; R09.02-Hypoxemia     COMPARISON:  Portable chest 09/01/2021     TECHNIQUE:   Single radiographic view of the abdomen was obtained.     FINDINGS:  There are right upper quadrant surgical clips. There is scattered gas  and stool in the colon. There is no gaseous distention of small bowel.  There is no gross soft tissue mass or abnormal calcification. There are  bilateral lower lung opacities greater on left than right, grossly  similar to the prior chest x-ray. There are right upper quadrant  surgical clips.     Impression: 1. Unremarkable bowel gas pattern.      Electronically Signed By-Breanna Sanchez MD On:9/2/2021 11:29 PM  This report was finalized on [id number] by  Breanna Sanchez MD.       ASSESSMENT:  Acute hypoxic respiratory failure on oxygen supplement  COVID-19  Pneumonia   NEELIMA  Asthma  Hypertension  Hypothyroidism  GERD     PLAN:  OOB daily   Continue to decrease O2 as tolerated to keep sats > 90%  Boost TID  Dexa/Vit C/Zinc  Tips and advised to improve his nutrition  Bronchodilator  Inhaled corticosteroids  Electrolytes/ glycemic control  DVT and GI prophylaxis    Discussed with Dr Jerad Parks, TOMMY   9/5/2021  11:54 EDT          I personally have examined  and interviewed the patient. I have reviewed the history, data, problems, assessment and plan with our NP.  Critical care time in direct medical management (   ) minutes  Electronically signed by Mathieu Mars MD, D,ABSM, 09/05/21, 8:16 PM EDT.         Electronically signed by Mathieu Mars MD at 09/05/21 2016

## 2021-09-07 NOTE — THERAPY EVALUATION
Patient Name: Danita Montiel  : 1965    MRN: [id number]                              Today's Date: 2021       Admit Date: 2021    Visit Dx:     ICD-10-CM ICD-9-CM   1. Dyspnea, unspecified type  R06.00 786.09   2. COVID-19  U07.1 079.89   3. Pneumonia due to COVID-19 virus  U07.1 480.8    J12.82 079.89   4. Hypoxia  R09.02 799.02     Patient Active Problem List   Diagnosis   • Dyspnea   • Pulmonary embolism, bilateral (CMS/HCC)   • Morbid obesity (CMS/HCC)   • Acute respiratory failure with hypoxia (CMS/HCC)   • Asthma   • GERD (gastroesophageal reflux disease)   • Anxiety   • Essential hypertension   • CKD (chronic kidney disease) stage 3, GFR 30-59 ml/min (CMS/[facility])   • Daily consumption of alcohol   • Hypothyroidism   • Pneumonia due to COVID-19 virus   • Hypoxia   • Atrial fibrillation with RVR (CMS/[facility])   • Benign hypertension with CKD (chronic kidney disease) stage III (CMS/HCC)     Past Medical History:   Diagnosis Date   • Anxiety    • Asthma    • CKD (chronic kidney disease) stage 3, GFR 30-59 ml/min (CMS/HCC)    • COVID-19 2020   • Endometriosis    • GERD (gastroesophageal reflux disease)    • Hypertension    • Hypothyroidism    • Obesity    • Ovarian cyst      Past Surgical History:   Procedure Laterality Date   • DIAGNOSTIC LAPAROSCOPY     • OVARIAN CYST DRAINAGE       General Information     Row Name 21 1031          Physical Therapy Time and Intention    Document Type  evaluation  -CM     Mode of Treatment  physical therapy  -CM     Row Name 21 1031          General Information    Patient Profile Reviewed  yes  -CM     Prior Level of Function  independent:;gait;community mobility;driving;work no home O2  -CM     Existing Precautions/Restrictions  fall;oxygen therapy device and L/min  -CM     Barriers to Rehab  none identified  -CM     Row Name 21 1031          Living Environment    Lives With  spouse  works full time  -CM     Row Name 21 1031           Home Main Entrance    Number of Stairs, Main Entrance  four  -CM     Stair Railings, Main Entrance  railings safe and in good condition  -CM     Row Name 09/07/21 1031          Stairs Within Home, Primary    Number of Stairs, Within Home, Primary  one  -CM     Row Name 09/07/21 1031          Cognition    Orientation Status (Cognition)  oriented x 4  -CM     Row Name 09/07/21 1045 09/07/21 1031       Safety Issues, Functional Mobility    Impairments Affecting Function (Mobility)  balance;endurance/activity tolerance;shortness of breath;strength  -CM  balance;endurance/activity tolerance;shortness of breath;strength  -CM    Comment, Safety Issues/Impairments (Mobility)  noted to have wet, non-productive cough throughout rx  -CM  --      User Key  (r) = Recorded By, (t) = Taken By, (c) = Cosigned By    Initials Name Provider Type    Africa Benton, PT Physical Therapist        Mobility     Row Name 09/07/21 1033          Bed Mobility    Bed Mobility  supine-sit  -CM     Supine-Sit [place] (Bed Mobility)  minimum assist (75% patient effort);contact guard  -CM     Assistive Device (Bed Mobility)  head of bed elevated  -CM     Row Name 09/07/21 1033          Sit-Stand Transfer    Sit-Stand [place] (Transfers)  minimum assist (75% patient effort)  -CM     Assistive Device (Sit-Stand Transfers)  other (see comments) hand held  -CM     Row Name 09/07/21 1033          Gait/Stairs (Locomotion)    [place] Level (Gait)  minimum assist (75% patient effort);moderate assist (50% patient effort);1 person assist  -CM     Assistive Device (Gait)  other (see comments) hand held; reaching for furniture  -CM     Distance in Feet (Gait)  12 ft  -CM     Deviations/Abnormal Patterns (Gait)  bilateral deviations;base of support, wide;gait speed decreased;stride length decreased  -CM     Bilateral Gait Deviations  forward flexed posture;heel strike decreased  -CM       User Key  (r) = Recorded By, (t) = Taken By, (c) =  Cosigned By    Initials Name Provider Type    Africa Benton, PT Physical Therapist        Obj/Interventions     Row Name 09/07/21 1037          Range of Motion Comprehensive    General Range of Motion  no range of motion deficits identified  -CM     Row Name 09/07/21 1037          Strength Comprehensive (MMT)    General Manual Muscle Testing (MMT) Assessment  lower extremity strength deficits identified  -CM     Comment, General Manual Muscle Testing (MMT) Assessment  strength 4-/5 BLEs; UEs wfl  -CM     Row Name 09/07/21 1037          Balance    Balance Assessment  sitting static balance;sitting dynamic balance;standing static balance;standing dynamic balance  -CM     Static Sitting Balance  WNL;unsupported;sitting, edge of bed  -CM     Dynamic Sitting Balance  WFL;sitting, edge of bed;unsupported  -CM     Static Standing Balance  mild impairment;supported;standing  -CM     Dynamic Standing Balance  moderate impairment;supported;standing  -CM     Row Name 09/07/21 1037          Sensory Assessment (Somatosensory)    Sensory Assessment (Somatosensory)  sensation intact  -CM       User Key  (r) = Recorded By, (t) = Taken By, (c) = Cosigned By    Initials Name Provider Type    Africa Benton, PT Physical Therapist        Goals/Plan     Row Name 09/07/21 1045          Bed Mobility Goal 1 (PT)    Activity/Assistive Device (Bed Mobility Goal 1, PT)  bed mobility activities, all  -CM     [place] Level/Cues Needed (Bed Mobility Goal 1, PT)  independent  -CM     Time Frame (Bed Mobility Goal 1, PT)  2 weeks  -CM     Row Name 09/07/21 1045          Transfer Goal 1 (PT)    Activity/Assistive Device (Transfer Goal 1, PT)  transfers, all;walker, rolling  -CM     [place] Level/Cues Needed (Transfer Goal 1, PT)  modified independence  -CM     Time Frame (Transfer Goal 1, PT)  2 weeks  -CM     Row Name 09/07/21 1045          Gait Training Goal 1 (PT)    Activity/Assistive Device (Gait Training Goal 1, PT)   gait (walking locomotion);walker, rolling  -CM     [place] Level (Gait Training Goal 1, PT)  modified independence  -CM     Distance (Gait Training Goal 1, PT)  200 ft w/ O2 sats > 88%  -CM     Time Frame (Gait Training Goal 1, PT)  2 weeks  -CM       User Key  (r) = Recorded By, (t) = Taken By, (c) = Cosigned By    Initials Name Provider Type    Africa Benton, PT Physical Therapist        Clinical Impression     Row Name 09/07/21 1038          Pain    Additional Documentation  Pain Scale: Numbers Pre/Post-Treatment (Group)  -CM     Row Name 09/07/21 1038          Pain Scale: Numbers Pre/Post-Treatment    Pretreatment Pain Rating  0/10 - no pain  -CM     Posttreatment Pain Rating  0/10 - no pain  -CM     Pain Intervention(s)  Repositioned;Emotional support;Ambulation/increased activity  -CM     Row Name 09/07/21 1038          Plan of Care Review    Plan of Care Reviewed With  patient  -CM     Outcome Summary  55 yo female adm on 8/29/21 for acute hypoxic respiratory failure. Dx w/ covid pna. 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 She lives w/ her , who works full time. Does not use home o2. Today, pt is quite anxious, stating she is not sure she has enough strength to stand. However, strength upon testing is 4-/5 in BLEs. She was able to come to sitting w/ cga, to stand w/ min assist, and amb w/ one hand held and mod assist x 12 ft. Pt leans forward and reaches for furniture w/ ambulation. She has a wide base of support and a shuffled gait. Recommend home w/ home health, possible home O2, and rw at d/c. Will follow.  -CM     Row Name 09/07/21 1038          Therapy Assessment/Plan (PT)    Patient/Family Therapy Goals Statement (PT)  agreeable to HH at d/c.  PT instructed pt to sit in chair x at least 2 hrs this morning, then to return to chair for 2 hrs in evening.  -CM     Rehab Potential (PT)  good, to achieve stated therapy goals   -CM     Criteria for Skilled Interventions Met (PT)  yes;meets criteria;skilled treatment is necessary  -CM     Predicted Duration of Therapy Intervention (PT)  until d/c  -CM     Row Name 09/07/21 1044          Vital Signs    Pre SpO2 (%)  93  -CM     O2 Delivery Pre Treatment  other (see comments) precision flow, 20L, 70%  -CM     Intra SpO2 (%)  83  -CM     O2 Delivery Intra Treatment  other (see comments) quick recovery to 92%  -CM     Post SpO2 (%)  99  -CM     O2 Delivery Post Treatment  other (see comments)  -CM     Row Name 09/07/21 1044          Positioning and Restraints    Pre-Treatment Position  in bed  -CM     Post Treatment Position  chair  -CM     In Chair  notified nsg;sitting;call light within reach;encouraged to call for assist;exit alarm on  -CM       User Key  (r) = Recorded By, (t) = Taken By, (c) = Cosigned By    Initials Name Provider Type    Africa Benton, PT Physical Therapist        Outcome Measures    No documentation.                      Physical Therapy Education                 Title: PT OT SLP Therapies (Done)     Topic: Physical Therapy (Done)     Point: Mobility training (Done)     Learning Progress Summary           Patient Acceptance, E,TB, VU by  at 9/7/2021 1047                   Point: Precautions (Done)     Learning Progress Summary           Patient Acceptance, E,TB, VU by  at 9/7/2021 1047                               User Key     Initials Effective Dates Name Provider Type Discipline     06/16/21 -  Africa Mccord, PT Physical Therapist PT              PT Recommendation and Plan  Planned Therapy Interventions (PT): balance training, bed mobility training, gait training, home exercise program, patient/family education, strengthening, ROM (range of motion), postural re-education, transfer training  Plan of Care Reviewed With: patient  Outcome Summary: 57 yo female adm on 8/29/21 for acute hypoxic respiratory failure. Dx w/ covid pna. 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 She lives w/ her , who works full time. Does not use home o2. Today, pt is quite anxious, stating she is not sure she has enough strength to stand. However, strength upon testing is 4-/5 in BLEs. She was able to come to sitting w/ cga, to stand w/ min assist, and amb w/ one hand held and mod assist x 12 ft. Pt leans forward and reaches for furniture w/ ambulation. She has a wide base of support and a shuffled gait. Recommend home w/ home health, possible home O2, and rw at d/c. Will follow.     Time Calculation:   PT Charges     Row Name 09/07/21 1047             Time Calculation    Start Time  0907  -CM      Stop Time  0940  -CM      Time Calculation (min)  33 min  -CM      PT Received On  09/07/21  -CM      PT - Next Appointment  09/08/21  -CM         Time Calculation- PT    Total Timed Code Minutes- PT  8 minute(s)  -CM        User Key  (r) = Recorded By, (t) = Taken By, (c) = Cosigned By    Initials Name Provider Type     Africa Mccord, PT Physical Therapist        Therapy Charges for Today     Code Description Service Date Service Provider Modifiers Qty    39421549582 HC PT EVAL MOD COMPLEXITY 3 9/7/2021 Africa Mccord, PT GP 1    42591619189 HC GAIT TRAINING EA 15 MIN 9/7/2021 Africa Mccord, PT GP 1               Africa Mccord, PT  9/7/2021

## 2021-09-08 LAB
ALBUMIN SERPL-MCNC: 2.9 G/DL (ref 3.5–5.2)
ALBUMIN/GLOB SERPL: 1.1 G/DL
ALP SERPL-CCNC: 68 U/L (ref 39–117)
ALT SERPL W P-5'-P-CCNC: 15 U/L (ref 1–33)
ANION GAP SERPL CALCULATED.3IONS-SCNC: 7 MMOL/L (ref 5–15)
AST SERPL-CCNC: 15 U/L (ref 1–32)
BILIRUB CONJ SERPL-MCNC: <0.2 MG/DL (ref 0–0.3)
BILIRUB SERPL-MCNC: 0.3 MG/DL (ref 0–1.2)
BUN SERPL-MCNC: 24 MG/DL (ref 6–20)
BUN/CREAT SERPL: 26.1 (ref 7–25)
CALCIUM SPEC-SCNC: 8.9 MG/DL (ref 8.6–10.5)
CHLORIDE SERPL-SCNC: 95 MMOL/L (ref 98–107)
CO2 SERPL-SCNC: 33 MMOL/L (ref 22–29)
CREAT SERPL-MCNC: 0.92 MG/DL (ref 0.57–1)
CRP SERPL-MCNC: 0.46 MG/DL (ref 0–0.5)
D DIMER PPP FEU-MCNC: 1.93 MG/L (FEU) (ref 0–0.59)
DEPRECATED RDW RBC AUTO: 44.6 FL (ref 37–54)
EOSINOPHIL # BLD MANUAL: 0.12 10*3/MM3 (ref 0–0.4)
EOSINOPHIL NFR BLD MANUAL: 1 % (ref 0.3–6.2)
ERYTHROCYTE [DISTWIDTH] IN BLOOD BY AUTOMATED COUNT: 13.8 % (ref 12.3–15.4)
GFR SERPL CREATININE-BSD FRML MDRD: 63 ML/MIN/1.73
GLOBULIN UR ELPH-MCNC: 2.6 GM/DL
GLUCOSE BLDC GLUCOMTR-MCNC: 109 MG/DL (ref 70–105)
GLUCOSE BLDC GLUCOMTR-MCNC: 146 MG/DL (ref 70–105)
GLUCOSE BLDC GLUCOMTR-MCNC: 170 MG/DL (ref 70–105)
GLUCOSE SERPL-MCNC: 138 MG/DL (ref 65–99)
HCT VFR BLD AUTO: 35.9 % (ref 34–46.6)
HGB BLD-MCNC: 12 G/DL (ref 12–15.9)
LYMPHOCYTES # BLD MANUAL: 0.71 10*3/MM3 (ref 0.7–3.1)
LYMPHOCYTES NFR BLD MANUAL: 4 % (ref 5–12)
LYMPHOCYTES NFR BLD MANUAL: 6 % (ref 19.6–45.3)
MCH RBC QN AUTO: 31.2 PG (ref 26.6–33)
MCHC RBC AUTO-ENTMCNC: 33.3 G/DL (ref 31.5–35.7)
MCV RBC AUTO: 93.7 FL (ref 79–97)
MONOCYTES # BLD AUTO: 0.48 10*3/MM3 (ref 0.1–0.9)
NEUTROPHILS # BLD AUTO: 10.59 10*3/MM3 (ref 1.7–7)
NEUTROPHILS NFR BLD MANUAL: 89 % (ref 42.7–76)
PLAT MORPH BLD: NORMAL
PLATELET # BLD AUTO: 365 10*3/MM3 (ref 140–450)
PMV BLD AUTO: 8.1 FL (ref 6–12)
POTASSIUM SERPL-SCNC: 5.1 MMOL/L (ref 3.5–5.2)
PROT SERPL-MCNC: 5.5 G/DL (ref 6–8.5)
RBC # BLD AUTO: 3.84 10*6/MM3 (ref 3.77–5.28)
RBC MORPH BLD: NORMAL
SCAN SLIDE: NORMAL
SODIUM SERPL-SCNC: 135 MMOL/L (ref 136–145)
WBC # BLD AUTO: 11.9 10*3/MM3 (ref 3.4–10.8)
WBC MORPH BLD: NORMAL

## 2021-09-08 PROCEDURE — 80053 COMPREHEN METABOLIC PANEL: CPT | Performed by: INTERNAL MEDICINE

## 2021-09-08 PROCEDURE — 63710000001 INSULIN LISPRO (HUMAN) PER 5 UNITS: Performed by: NURSE PRACTITIONER

## 2021-09-08 PROCEDURE — 82248 BILIRUBIN DIRECT: CPT | Performed by: INTERNAL MEDICINE

## 2021-09-08 PROCEDURE — 85025 COMPLETE CBC W/AUTO DIFF WBC: CPT | Performed by: INTERNAL MEDICINE

## 2021-09-08 PROCEDURE — 86140 C-REACTIVE PROTEIN: CPT | Performed by: INTERNAL MEDICINE

## 2021-09-08 PROCEDURE — 82962 GLUCOSE BLOOD TEST: CPT

## 2021-09-08 PROCEDURE — 94799 UNLISTED PULMONARY SVC/PX: CPT

## 2021-09-08 PROCEDURE — 25010000002 CEFTRIAXONE PER 250 MG: Performed by: FAMILY MEDICINE

## 2021-09-08 PROCEDURE — 25010000002 FUROSEMIDE PER 20 MG: Performed by: NURSE PRACTITIONER

## 2021-09-08 PROCEDURE — 99232 SBSQ HOSP IP/OBS MODERATE 35: CPT | Performed by: INTERNAL MEDICINE

## 2021-09-08 PROCEDURE — 25010000002 DEXAMETHASONE PER 1 MG: Performed by: NURSE PRACTITIONER

## 2021-09-08 PROCEDURE — 85007 BL SMEAR W/DIFF WBC COUNT: CPT | Performed by: INTERNAL MEDICINE

## 2021-09-08 PROCEDURE — 85379 FIBRIN DEGRADATION QUANT: CPT | Performed by: INTERNAL MEDICINE

## 2021-09-08 RX ORDER — DEXAMETHASONE SODIUM PHOSPHATE 4 MG/ML
6 INJECTION, SOLUTION INTRA-ARTICULAR; INTRALESIONAL; INTRAMUSCULAR; INTRAVENOUS; SOFT TISSUE DAILY
Status: DISCONTINUED | OUTPATIENT
Start: 2021-09-09 | End: 2021-09-10

## 2021-09-08 RX ADMIN — ALBUTEROL SULFATE 2 PUFF: 90 AEROSOL, METERED RESPIRATORY (INHALATION) at 19:37

## 2021-09-08 RX ADMIN — LEVOTHYROXINE SODIUM 112 MCG: 0.11 TABLET ORAL at 05:36

## 2021-09-08 RX ADMIN — ALBUTEROL SULFATE 2 PUFF: 90 AEROSOL, METERED RESPIRATORY (INHALATION) at 22:40

## 2021-09-08 RX ADMIN — HYDROCODONE POLISTIREX AND CHLORPHENIRAMINE POLISTIREX 5 ML: 10; 8 SUSPENSION, EXTENDED RELEASE ORAL at 21:06

## 2021-09-08 RX ADMIN — OXYCODONE HYDROCHLORIDE AND ACETAMINOPHEN 500 MG: 500 TABLET ORAL at 09:12

## 2021-09-08 RX ADMIN — BUDESONIDE AND FORMOTEROL FUMARATE DIHYDRATE 2 PUFF: 160; 4.5 AEROSOL RESPIRATORY (INHALATION) at 19:39

## 2021-09-08 RX ADMIN — GUAIFENESIN 600 MG: 600 TABLET, EXTENDED RELEASE ORAL at 09:12

## 2021-09-08 RX ADMIN — Medication 600 MG: at 21:06

## 2021-09-08 RX ADMIN — FUROSEMIDE 20 MG: 10 INJECTION INTRAMUSCULAR; INTRAVENOUS at 21:05

## 2021-09-08 RX ADMIN — BUDESONIDE AND FORMOTEROL FUMARATE DIHYDRATE 2 PUFF: 160; 4.5 AEROSOL RESPIRATORY (INHALATION) at 07:26

## 2021-09-08 RX ADMIN — DOCUSATE SODIUM 100 MG: 100 CAPSULE, LIQUID FILLED ORAL at 21:06

## 2021-09-08 RX ADMIN — ALBUTEROL SULFATE 2 PUFF: 90 AEROSOL, METERED RESPIRATORY (INHALATION) at 15:27

## 2021-09-08 RX ADMIN — Medication 220 MG: at 09:12

## 2021-09-08 RX ADMIN — FLUOXETINE 40 MG: 20 CAPSULE ORAL at 09:12

## 2021-09-08 RX ADMIN — CEFTRIAXONE SODIUM 1 G: 1 INJECTION, POWDER, FOR SOLUTION INTRAMUSCULAR; INTRAVENOUS at 14:01

## 2021-09-08 RX ADMIN — DILTIAZEM HYDROCHLORIDE 120 MG: 120 CAPSULE, COATED, EXTENDED RELEASE ORAL at 09:12

## 2021-09-08 RX ADMIN — ALBUTEROL SULFATE 2 PUFF: 90 AEROSOL, METERED RESPIRATORY (INHALATION) at 07:26

## 2021-09-08 RX ADMIN — DEXAMETHASONE SODIUM PHOSPHATE 6 MG: 4 INJECTION, SOLUTION INTRAMUSCULAR; INTRAVENOUS at 09:12

## 2021-09-08 RX ADMIN — ALBUTEROL SULFATE 2 PUFF: 90 AEROSOL, METERED RESPIRATORY (INHALATION) at 03:15

## 2021-09-08 RX ADMIN — POLYETHYLENE GLYCOL 3350 17 G: 17 POWDER, FOR SOLUTION ORAL at 09:11

## 2021-09-08 RX ADMIN — PANTOPRAZOLE SODIUM 40 MG: 40 TABLET, DELAYED RELEASE ORAL at 05:36

## 2021-09-08 RX ADMIN — MONTELUKAST 10 MG: 10 TABLET, FILM COATED ORAL at 21:06

## 2021-09-08 RX ADMIN — TRAZODONE HYDROCHLORIDE 50 MG: 50 TABLET ORAL at 21:06

## 2021-09-08 RX ADMIN — GUAIFENESIN 600 MG: 600 TABLET, EXTENDED RELEASE ORAL at 21:06

## 2021-09-08 RX ADMIN — Medication 3 ML: at 21:07

## 2021-09-08 RX ADMIN — RIVAROXABAN 20 MG: 20 TABLET, FILM COATED ORAL at 17:13

## 2021-09-08 RX ADMIN — Medication 3 ML: at 09:12

## 2021-09-08 RX ADMIN — FUROSEMIDE 20 MG: 10 INJECTION INTRAMUSCULAR; INTRAVENOUS at 09:12

## 2021-09-08 RX ADMIN — DOCUSATE SODIUM 100 MG: 100 CAPSULE, LIQUID FILLED ORAL at 09:13

## 2021-09-08 RX ADMIN — Medication 600 MG: at 09:11

## 2021-09-08 RX ADMIN — INSULIN LISPRO 2 UNITS: 100 INJECTION, SOLUTION INTRAVENOUS; SUBCUTANEOUS at 12:54

## 2021-09-08 RX ADMIN — Medication 1000 UNITS: at 09:12

## 2021-09-08 NOTE — PLAN OF CARE
Goal Outcome Evaluation:           Problem: Adult Inpatient Plan of Care  Goal: Absence of Hospital-Acquired Illness or Injury  Intervention: Identify and Manage Fall Risk  Recent Flowsheet Documentation  Taken 9/8/2021 1600 by Pat, Sanna, RN  Safety Promotion/Fall Prevention:   safety round/check completed   room organization consistent   nonskid shoes/slippers when out of bed   fall prevention program maintained   assistive device/personal items within reach   clutter free environment maintained   activity supervised  Taken 9/8/2021 1200 by Pat, Sanna, RN  Safety Promotion/Fall Prevention:   safety round/check completed   room organization consistent   nonskid shoes/slippers when out of bed   fall prevention program maintained   clutter free environment maintained   activity supervised   assistive device/personal items within reach  Taken 9/8/2021 1000 by [place], Sanna, RN  Safety Promotion/Fall Prevention:   safety round/check completed   room organization consistent   nonskid shoes/slippers when out of bed   fall prevention program maintained   assistive device/personal items within reach   clutter free environment maintained   activity supervised  Taken 9/8/2021 0800 by Sanna Stewart RN  Safety Promotion/Fall Prevention:   safety round/check completed   room organization consistent   nonskid shoes/slippers when out of bed   fall prevention program maintained   clutter free environment maintained   assistive device/personal items within reach   activity supervised     Problem: Adult Inpatient Plan of Care  Goal: Absence of Hospital-Acquired Illness or Injury  Intervention: Prevent Skin Injury  Recent Flowsheet Documentation  Taken 9/8/2021 0800 by Sanna Stewart, RN  Skin Protection:   adhesive use limited   incontinence pads utilized

## 2021-09-08 NOTE — PLAN OF CARE
Goal Outcome Evaluation:  Plan of Care Reviewed With: patient        Progress: no change  Outcome Summary: Patient has rested in bed during shift. Patient has experienced no acute events. Will continue to monitor.

## 2021-09-08 NOTE — CONSULTS
[facility] SPECIALISTS  Progress note, follow-up note        Subjective:     Encounter Date:08/29/2021      Patient ID: Danita Montiel is a 56 y.o. female.    Chief Complaint: shortness of breath, cough, atrial fibrillation    Referring Physician: Dr. Lopez    HPI: Patient is COVID 19 positive. Majority of information is obtained from nursing and chart.  Agree with narrative as discussed with nurse practitioner after my chart review, did not in the room, history obtained from EMR  Danita Montiel is a 56 y.o. female who presents with worsening shortness of breath and cough.  Patient does not routinely follow with a cardiologist.  Pmh includes CKD, HTN, hLD, obesity, prior diagnosis of pulmonary emboli and is on Xarelto as outpt. Patient reportedly was diagnosed with covid August 22. She was on steroids and albuterol but her symptoms worsened. She presented to hospital and has been undergoing treatment. She developed afib and was placed on oral diltiazem by primary. Her rates are improved control at present time. She neely have increasing O2 requirements.  Cardiology has been consulted for afib. She has no chest pain and no prior diagnosis of afib.  She is on Xarelto for anticoagulation.     Historical data copied forward from previous encounters in EMR is unchanged  ==========================================================  Seen today remotely, telemetry monitoring demonstrates continued sinus rhythm, no recurrence of any atrial fibrillation, no CV complaints per nursing staff.  Blood pressure and heart rate are both controlled well.  She is tolerating anticoagulation with Xarelto for paroxysmal atrial fibrillation setting of respiratory stress also with history of DVT  Stable dose of diltiazem will continue  Afterload reduction goal blood pressure less than 140 systolic generally    Past Medical History:   Diagnosis Date   • Anxiety    • Asthma    • CKD (chronic kidney disease) stage 3, GFR 30-59 ml/min  (CMS/Formerly [facility])    • COVID-19 11/16/2020   • Endometriosis    • GERD (gastroesophageal reflux disease)    • Hypertension    • Hypothyroidism    • Obesity    • Ovarian cyst        Past Surgical History:   Procedure Laterality Date   • DIAGNOSTIC LAPAROSCOPY     • OVARIAN CYST DRAINAGE         Family History   Problem Relation Age of Onset   • Heart disease Paternal Grandmother    • Heart disease Paternal Grandfather    • Stroke Other        Social History     Socioeconomic History   • Marital status:      Spouse name: Not on file   • Number of children: Not on file   • Years of education: Not on file   • Highest education level: Not on file   Tobacco Use   • Smoking status: Never Smoker   • Smokeless tobacco: Never Used   Vaping Use   • Vaping Use: Never used   Substance and Sexual Activity   • Alcohol use: Yes     Alcohol/week: 4.0 standard drinks     Types: 4 Standard drinks or equivalent per week     Comment: 1 shot of liquor nightly   • Drug use: Never   • Sexual activity: Defer         Allergies   Allergen Reactions   • Clarithromycin Itching       Current Medications:   Scheduled Meds:Acetylcysteine, 600 mg, Oral, BID  albuterol sulfate HFA, 2 puff, Inhalation, Q4H - RT  ascorbic acid, 500 mg, Oral, Daily  budesonide-formoterol, 2 puff, Inhalation, BID - RT  cefTRIAXone, 1 g, Intravenous, Q24H  cholecalciferol, 1,000 Units, Oral, Daily  dexamethasone, 6 mg, Intravenous, Q12H  dilTIAZem CD, 120 mg, Oral, Q24H  docusate sodium, 100 mg, Oral, BID  FLUoxetine, 40 mg, Oral, Daily  furosemide, 20 mg, Intravenous, Q12H  guaiFENesin, 600 mg, Oral, Q12H  insulin lispro, 0-9 Units, Subcutaneous, TID AC  levothyroxine, 112 mcg, Oral, Q AM  montelukast, 10 mg, Oral, Nightly  pantoprazole, 40 mg, Oral, QAM  polyethylene glycol, 17 g, Oral, Daily  rivaroxaban, 20 mg, Oral, Daily With Dinner  sodium chloride, 3 mL, Intravenous, Q12H  traZODone, 50 mg, Oral, Nightly  zinc sulfate, 220 mg, Oral, Daily      Continuous  "Infusions:     ROS         Objective:         /78   Pulse 93   Temp 98.2 °F (36.8 °C) (Oral)   Resp 20   Ht 167.6 cm (66\")   Wt 122 kg (269 lb 10 oz)   LMP  (LMP Unknown)   SpO2 92%   BMI 43.52 kg/m²       Exam deferred presently with Covid positive status and viral shedding  Noncritical CV exam        ASCVD RIsk Score::  The ASCVD Risk score (Clarissa CABRERA Jr., et al., 2013) failed to calculate for the following reasons:    Cannot find a previous HDL lab    Cannot find a previous total cholesterol lab      Lab Review:     Results from last 7 days   Lab Units 09/07/21  0331 09/06/21  0217   SODIUM mmol/L 136 135*   POTASSIUM mmol/L 4.3 5.5*   CHLORIDE mmol/L 95* 95*   CO2 mmol/L 32.0* 30.0*   BUN mg/dL 27* 30*   CREATININE mg/dL 0.83 0.91   GLUCOSE mg/dL 120* 116*   CALCIUM mg/dL 8.7 8.6   AST (SGOT) U/L 19 20   ALT (SGPT) U/L 17 16         Results from last 7 days   Lab Units 09/07/21  0331 09/06/21  0217   WBC 10*3/mm3 15.00* 14.70*   HEMOGLOBIN g/dL 12.8 12.0   HEMATOCRIT % 38.3 36.1   PLATELETS 10*3/mm3 446 408         Results from last 7 days   Lab Units 09/07/21  0331   MAGNESIUM mg/dL 2.2           Invalid input(s): LDLCALC            Recent Radiology:  Imaging Results (Most Recent)     Procedure Component Value Units Date/Time    XR Chest 1 View [[id number]] Collected: 09/07/21 1209     Updated: 09/07/21 1213    Narrative:      DATE OF EXAM:  9/7/2021 11:45 AM     PROCEDURE:  XR CHEST 1 VW-     INDICATIONS:  follow up pneumonia; R06.00-Dyspnea, unspecified; U07.1-COVID-19;  U07.1-COVID-19; J12.82-Pneumonia due to Coronavirus disease 2019;  R09.02-Hypoxemia     COMPARISON:  9/1/2021     TECHNIQUE:   Single radiographic view of the chest was obtained.     FINDINGS:  There is residual airspace opacity in the peripheral right lung, left  perihilar lung and left base. Overall, this is somewhat improved as  compared to prior. No pneumothorax is evident. Heart size and  mediastinal contour appear within " normal limits. Question trace left  pleural effusion.       Impression:      There has been some interval improvement in previously seen multifocal  airspace opacities.     Electronically Signed By-Ambrose Levy MD On:9/7/2021 12:11 PM  This report was finalized on [id number] by  Ambrose Levy MD.    XR Abdomen KUB [[id number]] Collected: 09/02/21 2327     Updated: 09/02/21 2331    Narrative:      DATE OF EXAM:  9/2/2021 11:04 PM     PROCEDURE:  XR ABDOMEN KUB-     INDICATIONS:  RUQ pain; R06.00-Dyspnea, unspecified; U07.1-COVID-19; U07.1-COVID-19;  J12.82-Pneumonia due to Coronavirus disease 2019; R09.02-Hypoxemia     COMPARISON:  Portable chest 09/01/2021     TECHNIQUE:   Single radiographic view of the abdomen was obtained.     FINDINGS:  There are right upper quadrant surgical clips. There is scattered gas  and stool in the colon. There is no gaseous distention of small bowel.  There is no gross soft tissue mass or abnormal calcification. There are  bilateral lower lung opacities greater on left than right, grossly  similar to the prior chest x-ray. There are right upper quadrant  surgical clips.       Impression:      1. Unremarkable bowel gas pattern.      Electronically Signed By-Breanna Sanchez MD On:9/2/2021 11:29 PM  This report was finalized on [id number] by  Breanna Sanchez MD.    XR Chest 1 View [[id number]] Collected: 09/01/21 0759     Updated: 09/01/21 0802    Narrative:      XR CHEST 1 VW-     Date of Exam: 9/1/2021 5:56 AM     Indication: Shortness of breath; R06.00-Dyspnea, unspecified;  U07.1-COVID-19; U07.1-COVID-19; J12.82-Pneumonia due to Coronavirus  disease 2019; R09.02-Hypoxemia.     Comparison Exams: August 31, 2021     Technique: Single AP chest radiograph     FINDINGS:  Patchy bilateral airspace opacities appear slightly worse on the left.  The heart and mediastinal contours appear stable. There is a small left  pleural effusion. The osseous structures appear intact.       Impression:       1.Patchy bilateral airspace opacities appear slightly worse on the left,  suggesting pneumonia.  2.Small left pleural effusion.     Electronically Signed By-Alex Adame MD On:9/1/2021 7:59 AM  This report was finalized on [id number] by  Alex Adame MD.    XR Chest 1 View [[id number]] Collected: 08/31/21 0748     Updated: 08/31/21 0751    Narrative:      XR CHEST 1 VW-     Date of Exam: 8/31/2021 5:02 AM     Indication: Shortness of breath; R06.00-Dyspnea, unspecified;  U07.1-COVID-19; U07.1-COVID-19; J12.82-Pneumonia due to Coronavirus  disease 2019; R09.02-Hypoxemia.     Comparison Exams: August 29, 2021     Technique: Single AP chest radiograph     FINDINGS:  Patchy bilateral airspace opacities appear slightly worse. The heart and  mediastinal contours appear stable. The osseous structures appear  intact.       Impression:      Patchy bilateral airspace opacities appear slightly worse, suggesting  pneumonia.     Electronically Signed By-Alex Adame MD On:8/31/2021 7:49 AM  This report was finalized on [id number] by  Alex Adame MD.    XR Chest 1 View [[id number]] Collected: 08/29/21 1307     Updated: 08/29/21 1310    Narrative:      DATE OF EXAM:  8/29/2021 12:40 PM     PROCEDURE:  XR CHEST 1 VW-     INDICATIONS:  Hypoxia, Covid 19 infection, shortness of breath.      COMPARISON:  4/17/2019.     TECHNIQUE:   Single radiographic view of the chest was obtained.     FINDINGS:  There are patchy areas of consolidation and increased interstitial  markings consistent with multifocal pneumonia as seen with the Covid 19  infection. The lungs and pleural spaces are otherwise clear. The heart  size is normal.  There is degenerative spondylosis of the thoracic  spine.       Impression:      Abnormal appearance of the lungs which can be seen with Covid 19  pneumonia.     Electronically Signed By-Hubert Campbell MD On:8/29/2021 1:08 PM  This report was finalized on [id number] by  Hubert Campbell MD.             ECHOCARDIOGRAM:    Results for orders placed during the hospital encounter of 12/28/20    Adult Transthoracic Echo Complete W/ Cont if Necessary Per Protocol    Interpretation Summary  · Left ventricular wall thickness is consistent with moderate concentric hypertrophy.  · Estimated left ventricular EF was in agreement with the calculated left ventricular EF. Left ventricular ejection fraction appears to be 66 - 70%. Left ventricular systolic function is normal.  · Left ventricular diastolic function is consistent with (grade Ia w/high LAP) impaired relaxation.  · Estimated right ventricular systolic pressure from tricuspid regurgitation is mildly elevated (35-45 mmHg).  · Mild pulmonary hypertension is present.            Assessment:         Active Hospital Problems    Diagnosis  POA   • **Pneumonia due to COVID-19 virus [U07.1, J12.82]  Yes   • Atrial fibrillation with RVR (CMS/HCC) [I48.91]  No   • Benign hypertension with CKD (chronic kidney disease) stage III (CMS/HCC) [I12.9, N18.30]  Yes   • Hypoxia [R09.02]  Yes     1. Acute hypoxic respiratory failure    2. COVID 19     3. Afib, new onset  - rates now controlled  - on Xarelto for anticoagulation  -Continue oral diltiazem    4. History preserved LVEF with grade 1 DD, chronic 12/2020    5. H/o HTN    6. H/o HLD    7. History of pulmonary emboli/DVT  - on Xarelto at home     Plan:   Currently rate controlled on diltiazem 180  Continue Xarelto for anticoagulation  Will plan to repeat ECHO as outpt, noncritical to repeat presently  Patient to discharge with event monitor with outpt cardiology follow up    We will follow peripherally, history of preserved LVEF  No evidence of acute coronary syndrome  On stable anticoagulation chronically, rates now controlled on diltiazem    Brennen Tobar MD, PhD           Brennen Tobar MD  08/31/21  22:14 EDT

## 2021-09-08 NOTE — CASE MANAGEMENT/SOCIAL WORK
Continued Stay Note   [place]     Patient Name: Danita Montiel  MRN: [id number]  Today's Date: 9/8/2021    Admit Date: 8/29/2021    Discharge Plan     Row Name 09/08/21 1452       Plan    Plan Comments  DC barriers: 10L HF, iv abx        Expected Discharge Date and Time     Expected Discharge Date Expected Discharge Time    Sep 13, 2021         Phone communication or documentation only - no physical contact with patient or family.      Ammy Guillen RN

## 2021-09-08 NOTE — CONSULTS
"Nutrition Services    Patient Name: Danita Montiel  YOB: 1965  MRN: [id number]  Admission date: 8/29/2021    PPE Documentation        PPE Worn By Provider Did not enter room for screening   PPE Worn By Patient  N/A     NUTRITION SCREENING      Encounter Information: 9/8: Pt screened due to LOS x 10 days. Per documentation, pt is eating with excellent intake, and weight has remained stable. Spoke with TONY Isidro via secure message to confirm pt still eating well, without difficulty. RN confirms good intake. Current diet continues to meet needs, as ordered.        PO Diet: Diet Regular   PO Supplements: None ordered   PO Intake:  % at meals, with good tolerance        Labs (reviewed below): Mildly elevated BUN - monitor   Hyperglycemia - likely related to steroid medication        GI Function:  BM 9/6       Skin: No Breakdown documented        Weight Hx Review: Estimated body mass index is 43.73 kg/m² as calculated from the following:    Height as of this encounter: 167.6 cm (66\").    Weight as of this encounter: 123 kg (270 lb 15.1 oz).    Weight stable.       Nutrition Intervention: Continue Regular diet as ordered.       Results from last 7 days   Lab Units 09/08/21 0258 09/07/21 0331 09/06/21  0217   SODIUM mmol/L 135* 136 135*   POTASSIUM mmol/L 5.1 4.3 5.5*   CHLORIDE mmol/L 95* 95* 95*   CO2 mmol/L 33.0* 32.0* 30.0*   BUN mg/dL 24* 27* 30*   CREATININE mg/dL 0.92 0.83 0.91   CALCIUM mg/dL 8.9 8.7 8.6   BILIRUBIN mg/dL 0.3 0.3 0.3   ALK PHOS U/L 68 81 70   ALT (SGPT) U/L 15 17 16   AST (SGOT) U/L 15 19 20   GLUCOSE mg/dL 138* 120* 116*     Results from last 7 days   Lab Units 09/08/21 0258 09/07/21 0331 09/07/21  0331   MAGNESIUM mg/dL  --   --  2.2   HEMOGLOBIN g/dL 12.0   < > 12.8   HEMATOCRIT % 35.9   < > 38.3    < > = values in this interval not displayed.     COVID19   Date Value Ref Range Status   08/29/2021 Detected (C) Not Detected - Ref. Range Final     No results found for: " HGBA1C    RD to follow up per protocol.    Electronically signed by:  Kayla Fletcher RD  09/08/21 15:44 EDT

## 2021-09-08 NOTE — PROGRESS NOTES
[facility] [place] [facility]        LOS:  LOS: 10 days   Patient Name: Danita Montiel  Age/Sex: 56 y.o. female  : 1965  MRN: [id number]    Day of Service: 21   Length of Stay: 10  Encounter Provider: TOMMY Baumann  Place of Service: [facility] CARDIOLOGY  Patient Care Team:  Jaya Harper MD as PCP - General  Grief, Jaya RODRIGUEZ MD as PCP - Family Medicine    Subjective:     Chief Complaint: f/u afib    Subjective: telemetry reviewed. SR on telemetry. No acute cardiac events. HR 70s.  No CV complaints, no CV complaints per nursing staff for acute concerns, continue rate and rhythm control strategy, anticoagulation on board for stroke risk reduction    Current Medications:   Scheduled Meds:Acetylcysteine, 600 mg, Oral, BID  albuterol sulfate HFA, 2 puff, Inhalation, Q4H - RT  ascorbic acid, 500 mg, Oral, Daily  budesonide-formoterol, 2 puff, Inhalation, BID - RT  cefTRIAXone, 1 g, Intravenous, Q24H  cholecalciferol, 1,000 Units, Oral, Daily  [START ON 2021] dexamethasone, 6 mg, Intravenous, Daily  dilTIAZem CD, 120 mg, Oral, Q24H  docusate sodium, 100 mg, Oral, BID  FLUoxetine, 40 mg, Oral, Daily  furosemide, 20 mg, Intravenous, Q12H  guaiFENesin, 600 mg, Oral, Q12H  insulin lispro, 0-9 Units, Subcutaneous, TID AC  levothyroxine, 112 mcg, Oral, Q AM  montelukast, 10 mg, Oral, Nightly  pantoprazole, 40 mg, Oral, QAM  polyethylene glycol, 17 g, Oral, Daily  rivaroxaban, 20 mg, Oral, Daily With Dinner  sodium chloride, 3 mL, Intravenous, Q12H  traZODone, 50 mg, Oral, Nightly  zinc sulfate, 220 mg, Oral, Daily      Continuous Infusions:     Allergies:  Allergies   Allergen Reactions   • Clarithromycin Itching       ROS      Objective:     Temp:  [97 °F (36.1 °C)-99 °F (37.2 °C)] 97.3 °F (36.3 °C)  Heart Rate:  [] 78  Resp:  [18-22] 18  BP: (123-158)/(60-83) 123/60     Intake/Output Summary (Last 24 hours) at 2021 0959  Last data filed at 2021  0229  Gross per 24 hour   Intake 600 ml   Output 2900 ml   Net -2300 ml     Body mass index is 43.73 kg/m².      09/04/21  0530 09/06/21  0500 09/08/21  0229   Weight: 121 kg (266 lb 8.6 oz) 122 kg (269 lb 10 oz) 123 kg (270 lb 15.1 oz)         Physical Exam: Deferred with Covid isolation                                                        Lab Review:   Results from last 7 days   Lab Units 09/08/21  0258 09/07/21  0331   SODIUM mmol/L 135* 136   POTASSIUM mmol/L 5.1 4.3   CHLORIDE mmol/L 95* 95*   CO2 mmol/L 33.0* 32.0*   BUN mg/dL 24* 27*   CREATININE mg/dL 0.92 0.83   GLUCOSE mg/dL 138* 120*   CALCIUM mg/dL 8.9 8.7   AST (SGOT) U/L 15 19   ALT (SGPT) U/L 15 17         Results from last 7 days   Lab Units 09/08/21  0258 09/07/21  0331   WBC 10*3/mm3 11.90* 15.00*   HEMOGLOBIN g/dL 12.0 12.8   HEMATOCRIT % 35.9 38.3   PLATELETS 10*3/mm3 365 446         Results from last 7 days   Lab Units 09/07/21  0331   MAGNESIUM mg/dL 2.2           Invalid input(s): LDLCALC            Recent Radiology:  Imaging Results (Most Recent)     Procedure Component Value Units Date/Time    XR Chest 1 View [[id number]] Collected: 09/07/21 1209     Updated: 09/07/21 1213    Narrative:      DATE OF EXAM:  9/7/2021 11:45 AM     PROCEDURE:  XR CHEST 1 VW-     INDICATIONS:  follow up pneumonia; R06.00-Dyspnea, unspecified; U07.1-COVID-19;  U07.1-COVID-19; J12.82-Pneumonia due to Coronavirus disease 2019;  R09.02-Hypoxemia     COMPARISON:  9/1/2021     TECHNIQUE:   Single radiographic view of the chest was obtained.     FINDINGS:  There is residual airspace opacity in the peripheral right lung, left  perihilar lung and left base. Overall, this is somewhat improved as  compared to prior. No pneumothorax is evident. Heart size and  mediastinal contour appear within normal limits. Question trace left  pleural effusion.       Impression:      There has been some interval improvement in previously seen multifocal  airspace opacities.      Electronically Signed By-Ambrose Levy MD On:9/7/2021 12:11 PM  This report was finalized on [id number] by  Ambrose Levy MD.    XR Abdomen KUB [[id number]] Collected: 09/02/21 2327     Updated: 09/02/21 2331    Narrative:      DATE OF EXAM:  9/2/2021 11:04 PM     PROCEDURE:  XR ABDOMEN KUB-     INDICATIONS:  RUQ pain; R06.00-Dyspnea, unspecified; U07.1-COVID-19; U07.1-COVID-19;  J12.82-Pneumonia due to Coronavirus disease 2019; R09.02-Hypoxemia     COMPARISON:  Portable chest 09/01/2021     TECHNIQUE:   Single radiographic view of the abdomen was obtained.     FINDINGS:  There are right upper quadrant surgical clips. There is scattered gas  and stool in the colon. There is no gaseous distention of small bowel.  There is no gross soft tissue mass or abnormal calcification. There are  bilateral lower lung opacities greater on left than right, grossly  similar to the prior chest x-ray. There are right upper quadrant  surgical clips.       Impression:      1. Unremarkable bowel gas pattern.      Electronically Signed By-Breanna Sanchez MD On:9/2/2021 11:29 PM  This report was finalized on [id number] by  Breanna Sanchez MD.    XR Chest 1 View [[id number]] Collected: 09/01/21 0759     Updated: 09/01/21 0802    Narrative:      XR CHEST 1 VW-     Date of Exam: 9/1/2021 5:56 AM     Indication: Shortness of breath; R06.00-Dyspnea, unspecified;  U07.1-COVID-19; U07.1-COVID-19; J12.82-Pneumonia due to Coronavirus  disease 2019; R09.02-Hypoxemia.     Comparison Exams: August 31, 2021     Technique: Single AP chest radiograph     FINDINGS:  Patchy bilateral airspace opacities appear slightly worse on the left.  The heart and mediastinal contours appear stable. There is a small left  pleural effusion. The osseous structures appear intact.       Impression:      1.Patchy bilateral airspace opacities appear slightly worse on the left,  suggesting pneumonia.  2.Small left pleural effusion.     Electronically Signed By-Alex  MD Deep On:9/1/2021 7:59 AM  This report was finalized on [id number] by  Alex Adame MD.    XR Chest 1 View [[id number]] Collected: 08/31/21 0748     Updated: 08/31/21 0751    Narrative:      XR CHEST 1 VW-     Date of Exam: 8/31/2021 5:02 AM     Indication: Shortness of breath; R06.00-Dyspnea, unspecified;  U07.1-COVID-19; U07.1-COVID-19; J12.82-Pneumonia due to Coronavirus  disease 2019; R09.02-Hypoxemia.     Comparison Exams: August 29, 2021     Technique: Single AP chest radiograph     FINDINGS:  Patchy bilateral airspace opacities appear slightly worse. The heart and  mediastinal contours appear stable. The osseous structures appear  intact.       Impression:      Patchy bilateral airspace opacities appear slightly worse, suggesting  pneumonia.     Electronically Signed By-Alex Adame MD On:8/31/2021 7:49 AM  This report was finalized on [id number] by  Alex Adame MD.    XR Chest 1 View [[id number]] Collected: 08/29/21 1307     Updated: 08/29/21 1310    Narrative:      DATE OF EXAM:  8/29/2021 12:40 PM     PROCEDURE:  XR CHEST 1 VW-     INDICATIONS:  Hypoxia, Covid 19 infection, shortness of breath.      COMPARISON:  4/17/2019.     TECHNIQUE:   Single radiographic view of the chest was obtained.     FINDINGS:  There are patchy areas of consolidation and increased interstitial  markings consistent with multifocal pneumonia as seen with the Covid 19  infection. The lungs and pleural spaces are otherwise clear. The heart  size is normal.  There is degenerative spondylosis of the thoracic  spine.       Impression:      Abnormal appearance of the lungs which can be seen with Covid 19  pneumonia.     Electronically Signed By-Hubert Campbell MD On:8/29/2021 1:08 PM  This report was finalized on [id number] by  Hubert Campbell MD.          ECHOCARDIOGRAM:    Results for orders placed during the hospital encounter of 12/28/20    Adult Transthoracic Echo Complete W/ Cont if Necessary Per  Protocol    Interpretation Summary  · Left ventricular wall thickness is consistent with moderate concentric hypertrophy.  · Estimated left ventricular EF was in agreement with the calculated left ventricular EF. Left ventricular ejection fraction appears to be 66 - 70%. Left ventricular systolic function is normal.  · Left ventricular diastolic function is consistent with (grade Ia w/high LAP) impaired relaxation.  · Estimated right ventricular systolic pressure from tricuspid regurgitation is mildly elevated (35-45 mmHg).  · Mild pulmonary hypertension is present.        I reviewed the patient's new clinical results.    EKG:      Assessment:       Pneumonia due to COVID-19 virus    Hypoxia    Atrial fibrillation with RVR (CMS/HCC)    Benign hypertension with CKD (chronic kidney disease) stage III (CMS/HCC)    1. Acute hypoxic respiratory failure     2. COVID 19      3. Afib, new onset  - rates now controlled  - on Xarelto for anticoagulation  -Continue oral diltiazem     4. History preserved LVEF with grade 1 DD, chronic 12/2020     5. H/o HTN     6. H/o HLD     7. History of pulmonary emboli/DVT  - on Xarelto at home       Plan:   Remains in SR. On diltiazem 120mg   On Xarelto for anticoagulation  Will plan to repeat ECHO as outpt, noncritical to repeat presently  Patient to discharge with event monitor with outpt cardiology follow up    Will see as needed and follow peripherally. Please call with questions    Brennen Tobar MD, PhD        Lyndsey Acosta, TOMMY  09/08/21  09:59 EDT

## 2021-09-08 NOTE — PROGRESS NOTES
LOS: 10 days   Patient Care Team:  Jaya Harper MD as PCP - General  Grief, Jaya RODRIGUEZ MD as PCP - Family Medicine    Subjective     Interval History:    Patient Complaints:  Improving- slept better last night      History taken from: patient    Review of Systems   Constitutional: Positive for activity change and fatigue. Negative for fever.   HENT: Negative for trouble swallowing.    Respiratory: Positive for cough and shortness of breath.    Cardiovascular: Negative for chest pain, palpitations and leg swelling.   Gastrointestinal: Negative for abdominal pain, constipation, diarrhea, nausea and vomiting.   Genitourinary: Negative for difficulty urinating.   Musculoskeletal: Positive for gait problem.   Neurological: Positive for weakness.   Psychiatric/Behavioral: Negative for confusion.           Objective     Vital Signs  Temp:  [97 °F (36.1 °C)-99 °F (37.2 °C)] 97.3 °F (36.3 °C)  Heart Rate:  [] 78  Resp:  [18-22] 18  BP: (123-158)/(60-83) 123/60    Physical Exam:     General Appearance:    Alert, cooperative, in no acute distress,   Head:    Normocephalic, without obvious abnormality, atraumatic   Eyes:            Lids and lashes normal, conjunctivae and sclerae normal, no   icterus, no pallor, corneas clear, PERRLA   Ears:    Ears appear intact with no abnormalities noted   Throat:   No oral lesions, no thrush, oral mucosa moist   Neck:   No adenopathy, supple, trachea midline, no thyromegaly, no   carotid bruit, no JVD   Lungs:     Rhonchi throughout - respirations regular, even and                  unlabored    Heart:    Regular rhythm and normal rate, normal S1 and S2, no            murmur, no gallop, no rub, no click   Chest Wall:    No abnormalities observed   Abdomen:     Normal bowel sounds, no masses, no organomegaly, soft        Non-tender non-distended, no guarding,   Extremities:   Moves all extremities well, no edema, no cyanosis, no             Redness   Pulses:   Pulses palpable  and equal bilaterally   Skin:   No bleeding, bruising or rash   Lymph nodes:   No palpable adenopathy   Neurologic:   Cranial nerves 2 - 12 grossly intact, sensation intact, DTR       present and equal bilaterally        Results Review:    Lab Results (last 24 hours)     Procedure Component Value Units Date/Time    Manual Differential [[id number]]  (Abnormal) Collected: 09/08/21 0258    Specimen: Blood Updated: 09/08/21 0736     Neutrophil % 89.0 %      Lymphocyte % 6.0 %      Monocyte % 4.0 %      Eosinophil % 1.0 %      Neutrophils Absolute 10.59 10*3/mm3      Lymphocytes Absolute 0.71 10*3/mm3      Monocytes Absolute 0.48 10*3/mm3      Eosinophils Absolute 0.12 10*3/mm3      RBC Morphology Normal     WBC Morphology Normal     Platelet Morphology Normal    CBC & Differential [[id number]]  (Abnormal) Collected: 09/08/21 0258    Specimen: Blood Updated: 09/08/21 0736    Narrative:      The following orders were created for panel order CBC & Differential.  Procedure                               Abnormality         Status                     ---------                               -----------         ------                     CBC Auto Differential[[id number]]        Abnormal            Final result               Scan Slide[[id number]]                                       Final result                 Please view results for these tests on the individual orders.    Scan Slide [[id number]] Collected: 09/08/21 0258    Specimen: Blood Updated: 09/08/21 0736     Scan Slide --     Comment: See Manual Differential Results       CBC Auto Differential [[id number]]  (Abnormal) Collected: 09/08/21 0258    Specimen: Blood Updated: 09/08/21 0736     WBC 11.90 10*3/mm3      RBC 3.84 10*6/mm3      Hemoglobin 12.0 g/dL      Hematocrit 35.9 %      MCV 93.7 fL      MCH 31.2 pg      MCHC 33.3 g/dL      RDW 13.8 %      RDW-SD 44.6 fl      MPV 8.1 fL      Platelets 365 10*3/mm3     Narrative:      The previously reported component NRBC is no  longer being reported. Previous result was 0.0 /100 WBC (Reference Range: 0.0-0.2 /100 WBC) on 9/8/2021 at 0326 EDT.    D-dimer, Quantitative [[id number]]  (Abnormal) Collected: 09/08/21 0258    Specimen: Blood Updated: 09/08/21 0350     D-Dimer, Quantitative 1.93 mg/L (FEU)     Narrative:      Reference Range  --------------------------------------------------------------------     < 0.50   Negative Predictive Value  0.50-0.59   Indeterminate    >= 0.60   Probable VTE             A very low percentage of patients with DVT may yield D-Dimer results   below the cut-off of 0.50 mg/L FEU.  This is known to be more   prevalent in patients with distal DVT.             Results of this test should always be interpreted in conjunction with   the patient's medical history, clinical presentation and other   findings.  Clinical diagnosis should not be based on the result of   [place] D-Dimer alone.    Comprehensive Metabolic Panel [[id number]]  (Abnormal) Collected: 09/08/21 0258    Specimen: Blood Updated: 09/08/21 0338     Glucose 138 mg/dL      BUN 24 mg/dL      Creatinine 0.92 mg/dL      Sodium 135 mmol/L      Potassium 5.1 mmol/L      Chloride 95 mmol/L      CO2 33.0 mmol/L      Calcium 8.9 mg/dL      Total Protein 5.5 g/dL      Albumin 2.90 g/dL      ALT (SGPT) 15 U/L      AST (SGOT) 15 U/L      Alkaline Phosphatase 68 U/L      Total Bilirubin 0.3 mg/dL      eGFR Non African Amer 63 mL/min/1.73      Globulin 2.6 gm/dL      A/G Ratio 1.1 g/dL      BUN/Creatinine Ratio 26.1     Anion Gap 7.0 mmol/L     Narrative:      GFR Normal >60  Chronic Kidney Disease <60  Kidney Failure <15      C-reactive Protein [[id number]]  (Normal) Collected: 09/08/21 0258    Specimen: Blood Updated: 09/08/21 0338     C-Reactive Protein 0.46 mg/dL     Bilirubin, Direct [[id number]]  (Normal) Collected: 09/08/21 0258    Specimen: Blood Updated: 09/08/21 0338     Bilirubin, Direct <0.2 mg/dL     POC Glucose Once [[id number]]  (Abnormal) Collected:  09/07/21 2103    Specimen: Blood Updated: 09/07/21 2104     Glucose 110 mg/dL      Comment: Serial Number: [id number]:  [id number]       POC Glucose Once [[id number]]  (Abnormal) Collected: 09/07/21 1741    Specimen: Blood Updated: 09/07/21 1742     Glucose 134 mg/dL      Comment: Serial Number: [id number]:  [id number]              Imaging Results (Last 24 Hours)     ** No results found for the last 24 hours. **               I reviewed the patient's new clinical results.    Medication Review:   Scheduled Meds:Acetylcysteine, 600 mg, Oral, BID  albuterol sulfate HFA, 2 puff, Inhalation, Q4H - RT  ascorbic acid, 500 mg, Oral, Daily  budesonide-formoterol, 2 puff, Inhalation, BID - RT  cefTRIAXone, 1 g, Intravenous, Q24H  cholecalciferol, 1,000 Units, Oral, Daily  [START ON 9/9/2021] dexamethasone, 6 mg, Intravenous, Daily  dilTIAZem CD, 120 mg, Oral, Q24H  docusate sodium, 100 mg, Oral, BID  FLUoxetine, 40 mg, Oral, Daily  furosemide, 20 mg, Intravenous, Q12H  guaiFENesin, 600 mg, Oral, Q12H  insulin lispro, 0-9 Units, Subcutaneous, TID AC  levothyroxine, 112 mcg, Oral, Q AM  montelukast, 10 mg, Oral, Nightly  pantoprazole, 40 mg, Oral, QAM  polyethylene glycol, 17 g, Oral, Daily  rivaroxaban, 20 mg, Oral, Daily With Dinner  sodium chloride, 3 mL, Intravenous, Q12H  traZODone, 50 mg, Oral, Nightly  zinc sulfate, 220 mg, Oral, Daily      Continuous Infusions:   PRN Meds:.benzonatate  •  dextrose  •  dextrose  •  glucagon (human recombinant)  •  HYDROcod Polst-CPM Polst ER  •  insulin lispro **AND** insulin lispro  •  LORazepam  •  melatonin  •  metoprolol tartrate  •  Morphine  •  ondansetron  •  sodium chloride  •  [COMPLETED] Insert peripheral IV **AND** sodium chloride  •  sodium chloride  •  zolpidem     Assessment/Plan       Pneumonia due to COVID-19 virus    Hypoxia    Atrial fibrillation with RVR (CMS/HCC)    Benign hypertension with CKD (chronic kidney disease) stage III (CMS/HCC)    Remdesivir is  complete - finishing rocephin . - continue bronchodilator, lasix, pretty , acetylcysteine, zinc     PAF- maintaining in SR on diltiazem - on xarelto - will dc with event monitor and cardio fu     Insomnia- likely related to steroids- trazodone with help     Discussed with pt the need to get out of bed with assistance and use the IS  Plan for disposition: LOLY Donis, TOMMY  09/08/21  10:41 EDT

## 2021-09-08 NOTE — PROGRESS NOTES
Daily Progress Note    Pneumonia due to COVID-19 virus    Hypoxia    Atrial fibrillation with RVR (CMS/HCC)    Benign hypertension with CKD (chronic kidney disease) stage III (CMS/[facility])    Assessment    Acute hypoxic respiratory failure secondary to COVID-19 Pneumonia     9/6 on PF 25/85 plan discussed with patient and ; OOB for every meal use IS which was demonstrated at bedside with pull of 500 mL  9/7 patient OOB this am with PF down 25/70 9/8 patient OOB on oxygen 8 liters    NEELIMA  Asthma  Hypertension  Hypothyroidism  GERD    Plan    Decadron 6 mg d  Vit C/Zinc  Antibiotic doxycycline and rocephin  Lasix 20 bid  Mucomyst/mucinex  OOB for meals encourage IS  Bronchodilator/Inhaled corticosteroids  Gycemic control  Regular diet with supplements for less than 50% eaten at meals  Anticoagulation rivaroxaban    Remdesivir completed      COVID-19 LAB PANEL     COVID-19 test result  COVID19   Date Value Ref Range Status   08/29/2021 Detected (C) Not Detected - Ref. Range Final       COVID-19 Prognostic lab results  WBC with differential  Results from last 7 days   Lab Units 09/08/21  0258 09/07/21 0331 09/06/21 0217 09/05/21 0223 09/04/21 0312 09/03/21 0241 09/02/21 0227   WBC 10*3/mm3 11.90* 15.00* 14.70* 15.20* 15.10* 13.90* 11.80*   PLATELETS 10*3/mm3 365 446 408 365 345 324 272   NEUTROPHIL % %  --   --   --   --   --  86.9* 87.0*   LYMPHOCYTE % %  --   --   --   --   --  6.1* 6.7*   NEUTROS ABS 10*3/mm3  --  13.05* 12.94* 13.68* 12.53* 12.10* 10.30*   LYMPHS ABS 10*3/mm3  --  1.35 1.03 0.61* 1.51  --   --      Inflammatory markers  Results from last 7 days   Lab Units 09/08/21  0258 09/07/21 0331 09/06/21 0217 09/05/21 0223 09/04/21 0312 09/03/21 0241 09/02/21 0227   CRP mg/dL 0.46 0.93* 0.81* 1.18* 1.53* 2.56* 4.51*   D DIMER QUANT mg/L (FEU) 1.93* 2.71* 1.68* 0.96* 0.83* 0.51 0.48   ALK PHOS U/L 68 81 70 67 71 62 63   AST (SGOT) U/L 15 19 20 19 22 21 23   ALT (SGPT) U/L 15 17 16 15 16 17 18        Poor COVID-19 outcomes associated with:  Neutrophil:Lymphocyte ratio >3.5  Thrombocytopenia  LFT's >5x upper limit of normal  LDH >400   LOS: 10 days       Subjective         Objective     Vital signs for last 24 hours:  Vitals:    09/08/21 0229 09/08/21 0315 09/08/21 0317 09/08/21 0531   BP: 131/74   123/60   BP Location: Right arm   Right arm   Patient Position: Lying   Lying   Pulse: 58 57 56 57   Resp: 18 18 18 18   Temp: 97 °F (36.1 °C)   97.3 °F (36.3 °C)   TempSrc: Oral   Oral   SpO2: 94% 97% 97% 95%   Weight: 123 kg (270 lb 15.1 oz)      Height:           Intake/Output last 3 shifts:  I/O last 3 completed shifts:  In: 1320 [P.O.:1320]  Out: 3500 [Urine:3500]  Intake/Output this shift:  I/O this shift:  In: 240 [P.O.:240]  Out: 2200 [Urine:2200]      Radiology  Imaging Results (Last 24 Hours)     Procedure Component Value Units Date/Time    XR Chest 1 View [[id number]] Collected: 09/07/21 1209     Updated: 09/07/21 1213    Narrative:      DATE OF EXAM:  9/7/2021 11:45 AM     PROCEDURE:  XR CHEST 1 VW-     INDICATIONS:  follow up pneumonia; R06.00-Dyspnea, unspecified; U07.1-COVID-19;  U07.1-COVID-19; J12.82-Pneumonia due to Coronavirus disease 2019;  R09.02-Hypoxemia     COMPARISON:  9/1/2021     TECHNIQUE:   Single radiographic view of the chest was obtained.     FINDINGS:  There is residual airspace opacity in the peripheral right lung, left  perihilar lung and left base. Overall, this is somewhat improved as  compared to prior. No pneumothorax is evident. Heart size and  mediastinal contour appear within normal limits. Question trace left  pleural effusion.       Impression:      There has been some interval improvement in previously seen multifocal  airspace opacities.     Electronically Signed By-Ambrose Levy MD On:9/7/2021 12:11 PM  This report was finalized on [id number] by  Ambrose Levy MD.          Labs:  Results from last 7 days   Lab Units 09/08/21  0258   WBC 10*3/mm3 11.90*   HEMOGLOBIN  g/dL 12.0   HEMATOCRIT % 35.9   PLATELETS 10*3/mm3 365     Results from last 7 days   Lab Units 09/08/21  0258   SODIUM mmol/L 135*   POTASSIUM mmol/L 5.1   CHLORIDE mmol/L 95*   CO2 mmol/L 33.0*   BUN mg/dL 24*   CREATININE mg/dL 0.92   CALCIUM mg/dL 8.9   BILIRUBIN mg/dL 0.3   ALK PHOS U/L 68   ALT (SGPT) U/L 15   AST (SGOT) U/L 15   GLUCOSE mg/dL 138*     Results from last 7 days   Lab Units 09/01/21  0855   PH, ARTERIAL pH units 7.421   PO2 ART mm Hg 50.4*   PCO2, ARTERIAL mm Hg 31.9*   HCO3 ART mmol/L 20.7*     Results from last 7 days   Lab Units 09/08/21  0258 09/07/21  0331 09/06/21  0217   ALBUMIN g/dL 2.90* 3.40* 3.00*             Results from last 7 days   Lab Units 09/07/21  0331   MAGNESIUM mg/dL 2.2                   Meds:   SCHEDULE  Acetylcysteine, 600 mg, Oral, BID  albuterol sulfate HFA, 2 puff, Inhalation, Q4H - RT  ascorbic acid, 500 mg, Oral, Daily  budesonide-formoterol, 2 puff, Inhalation, BID - RT  cefTRIAXone, 1 g, Intravenous, Q24H  cholecalciferol, 1,000 Units, Oral, Daily  dexamethasone, 6 mg, Intravenous, Q12H  dilTIAZem CD, 120 mg, Oral, Q24H  docusate sodium, 100 mg, Oral, BID  FLUoxetine, 40 mg, Oral, Daily  furosemide, 20 mg, Intravenous, Q12H  guaiFENesin, 600 mg, Oral, Q12H  insulin lispro, 0-9 Units, Subcutaneous, TID AC  levothyroxine, 112 mcg, Oral, Q AM  montelukast, 10 mg, Oral, Nightly  pantoprazole, 40 mg, Oral, QAM  polyethylene glycol, 17 g, Oral, Daily  rivaroxaban, 20 mg, Oral, Daily With Dinner  sodium chloride, 3 mL, Intravenous, Q12H  traZODone, 50 mg, Oral, Nightly  zinc sulfate, 220 mg, Oral, Daily      Infusions     PRNs  benzonatate  •  dextrose  •  dextrose  •  glucagon (human recombinant)  •  HYDROcod Polst-CPM Polst ER  •  insulin lispro **AND** insulin lispro  •  LORazepam  •  melatonin  •  metoprolol tartrate  •  Morphine  •  ondansetron  •  sodium chloride  •  [COMPLETED] Insert peripheral IV **AND** sodium chloride  •  sodium chloride  •   zolpidem    Physical Exam:  Physical Exam  Vitals reviewed.   Constitutional:       Appearance: She is ill-appearing.   Pulmonary:      Breath sounds: Rhonchi present.   Skin:     General: Skin is warm and dry.   Neurological:      Mental Status: She is alert and oriented to person, place, and time.         ROS  Review of Systems   Constitutional: Positive for activity change, appetite change and fatigue.   Respiratory: Positive for cough and shortness of breath.        I reviewed the patient's new clinical results.      Electronically signed by TOMMY Barrett, 09/08/21 at 06:36 EDT.

## 2021-09-09 LAB
ALBUMIN SERPL-MCNC: 3 G/DL (ref 3.5–5.2)
ALBUMIN/GLOB SERPL: 1.1 G/DL
ALP SERPL-CCNC: 74 U/L (ref 39–117)
ALT SERPL W P-5'-P-CCNC: 18 U/L (ref 1–33)
ANION GAP SERPL CALCULATED.3IONS-SCNC: 7 MMOL/L (ref 5–15)
AST SERPL-CCNC: 23 U/L (ref 1–32)
BILIRUB CONJ SERPL-MCNC: <0.2 MG/DL (ref 0–0.3)
BILIRUB SERPL-MCNC: 0.2 MG/DL (ref 0–1.2)
BUN SERPL-MCNC: 31 MG/DL (ref 6–20)
BUN/CREAT SERPL: 35.2 (ref 7–25)
CALCIUM SPEC-SCNC: 8.5 MG/DL (ref 8.6–10.5)
CHLORIDE SERPL-SCNC: 95 MMOL/L (ref 98–107)
CO2 SERPL-SCNC: 32 MMOL/L (ref 22–29)
CREAT SERPL-MCNC: 0.88 MG/DL (ref 0.57–1)
CRP SERPL-MCNC: <0.3 MG/DL (ref 0–0.5)
D DIMER PPP FEU-MCNC: 1.51 MG/L (FEU) (ref 0–0.59)
DEPRECATED RDW RBC AUTO: 43.3 FL (ref 37–54)
EOSINOPHIL # BLD MANUAL: 0.14 10*3/MM3 (ref 0–0.4)
EOSINOPHIL NFR BLD MANUAL: 1 % (ref 0.3–6.2)
ERYTHROCYTE [DISTWIDTH] IN BLOOD BY AUTOMATED COUNT: 13.4 % (ref 12.3–15.4)
GFR SERPL CREATININE-BSD FRML MDRD: 66 ML/MIN/1.73
GLOBULIN UR ELPH-MCNC: 2.7 GM/DL
GLUCOSE BLDC GLUCOMTR-MCNC: 105 MG/DL (ref 70–105)
GLUCOSE BLDC GLUCOMTR-MCNC: 110 MG/DL (ref 70–105)
GLUCOSE BLDC GLUCOMTR-MCNC: 120 MG/DL (ref 70–105)
GLUCOSE BLDC GLUCOMTR-MCNC: 138 MG/DL (ref 70–105)
GLUCOSE BLDC GLUCOMTR-MCNC: 84 MG/DL (ref 70–105)
GLUCOSE SERPL-MCNC: 87 MG/DL (ref 65–99)
HCT VFR BLD AUTO: 33.9 % (ref 34–46.6)
HGB BLD-MCNC: 11.5 G/DL (ref 12–15.9)
LYMPHOCYTES # BLD MANUAL: 2.72 10*3/MM3 (ref 0.7–3.1)
LYMPHOCYTES NFR BLD MANUAL: 20 % (ref 19.6–45.3)
LYMPHOCYTES NFR BLD MANUAL: 8 % (ref 5–12)
MCH RBC QN AUTO: 31.8 PG (ref 26.6–33)
MCHC RBC AUTO-ENTMCNC: 34 G/DL (ref 31.5–35.7)
MCV RBC AUTO: 93.5 FL (ref 79–97)
MONOCYTES # BLD AUTO: 1.09 10*3/MM3 (ref 0.1–0.9)
MYELOCYTES NFR BLD MANUAL: 1 % (ref 0–0)
NEUTROPHILS # BLD AUTO: 9.52 10*3/MM3 (ref 1.7–7)
NEUTROPHILS NFR BLD MANUAL: 70 % (ref 42.7–76)
PLAT MORPH BLD: NORMAL
PLATELET # BLD AUTO: 394 10*3/MM3 (ref 140–450)
PMV BLD AUTO: 8.1 FL (ref 6–12)
POTASSIUM SERPL-SCNC: 4.4 MMOL/L (ref 3.5–5.2)
PROT SERPL-MCNC: 5.7 G/DL (ref 6–8.5)
RBC # BLD AUTO: 3.62 10*6/MM3 (ref 3.77–5.28)
RBC MORPH BLD: NORMAL
SCAN SLIDE: NORMAL
SODIUM SERPL-SCNC: 134 MMOL/L (ref 136–145)
WBC # BLD AUTO: 13.6 10*3/MM3 (ref 3.4–10.8)
WBC MORPH BLD: NORMAL

## 2021-09-09 PROCEDURE — 25010000002 DEXAMETHASONE PER 1 MG: Performed by: NURSE PRACTITIONER

## 2021-09-09 PROCEDURE — 85025 COMPLETE CBC W/AUTO DIFF WBC: CPT | Performed by: INTERNAL MEDICINE

## 2021-09-09 PROCEDURE — 80053 COMPREHEN METABOLIC PANEL: CPT | Performed by: INTERNAL MEDICINE

## 2021-09-09 PROCEDURE — 82248 BILIRUBIN DIRECT: CPT | Performed by: INTERNAL MEDICINE

## 2021-09-09 PROCEDURE — 86140 C-REACTIVE PROTEIN: CPT | Performed by: INTERNAL MEDICINE

## 2021-09-09 PROCEDURE — 94799 UNLISTED PULMONARY SVC/PX: CPT

## 2021-09-09 PROCEDURE — 36415 COLL VENOUS BLD VENIPUNCTURE: CPT | Performed by: INTERNAL MEDICINE

## 2021-09-09 PROCEDURE — 97116 GAIT TRAINING THERAPY: CPT

## 2021-09-09 PROCEDURE — 25010000002 FUROSEMIDE PER 20 MG: Performed by: NURSE PRACTITIONER

## 2021-09-09 PROCEDURE — 85379 FIBRIN DEGRADATION QUANT: CPT | Performed by: INTERNAL MEDICINE

## 2021-09-09 PROCEDURE — 97110 THERAPEUTIC EXERCISES: CPT

## 2021-09-09 PROCEDURE — 25010000002 CEFTRIAXONE PER 250 MG: Performed by: FAMILY MEDICINE

## 2021-09-09 PROCEDURE — 85007 BL SMEAR W/DIFF WBC COUNT: CPT | Performed by: INTERNAL MEDICINE

## 2021-09-09 PROCEDURE — 82962 GLUCOSE BLOOD TEST: CPT

## 2021-09-09 RX ADMIN — BUDESONIDE AND FORMOTEROL FUMARATE DIHYDRATE 2 PUFF: 160; 4.5 AEROSOL RESPIRATORY (INHALATION) at 20:04

## 2021-09-09 RX ADMIN — Medication 220 MG: at 10:27

## 2021-09-09 RX ADMIN — DILTIAZEM HYDROCHLORIDE 120 MG: 120 CAPSULE, COATED, EXTENDED RELEASE ORAL at 10:27

## 2021-09-09 RX ADMIN — CEFTRIAXONE SODIUM 1 G: 1 INJECTION, POWDER, FOR SOLUTION INTRAMUSCULAR; INTRAVENOUS at 14:54

## 2021-09-09 RX ADMIN — ALBUTEROL SULFATE 2 PUFF: 90 AEROSOL, METERED RESPIRATORY (INHALATION) at 06:49

## 2021-09-09 RX ADMIN — Medication 3 ML: at 10:29

## 2021-09-09 RX ADMIN — DEXAMETHASONE SODIUM PHOSPHATE 6 MG: 4 INJECTION, SOLUTION INTRAMUSCULAR; INTRAVENOUS at 10:28

## 2021-09-09 RX ADMIN — OXYCODONE HYDROCHLORIDE AND ACETAMINOPHEN 500 MG: 500 TABLET ORAL at 10:27

## 2021-09-09 RX ADMIN — TRAZODONE HYDROCHLORIDE 50 MG: 50 TABLET ORAL at 21:48

## 2021-09-09 RX ADMIN — FUROSEMIDE 20 MG: 10 INJECTION INTRAMUSCULAR; INTRAVENOUS at 21:48

## 2021-09-09 RX ADMIN — ALBUTEROL SULFATE 2 PUFF: 90 AEROSOL, METERED RESPIRATORY (INHALATION) at 20:02

## 2021-09-09 RX ADMIN — MONTELUKAST 10 MG: 10 TABLET, FILM COATED ORAL at 21:48

## 2021-09-09 RX ADMIN — GUAIFENESIN 600 MG: 600 TABLET, EXTENDED RELEASE ORAL at 21:48

## 2021-09-09 RX ADMIN — ALBUTEROL SULFATE 2 PUFF: 90 AEROSOL, METERED RESPIRATORY (INHALATION) at 03:09

## 2021-09-09 RX ADMIN — LEVOTHYROXINE SODIUM 112 MCG: 0.11 TABLET ORAL at 05:32

## 2021-09-09 RX ADMIN — Medication 3 ML: at 21:49

## 2021-09-09 RX ADMIN — PANTOPRAZOLE SODIUM 40 MG: 40 TABLET, DELAYED RELEASE ORAL at 05:32

## 2021-09-09 RX ADMIN — ALBUTEROL SULFATE 2 PUFF: 90 AEROSOL, METERED RESPIRATORY (INHALATION) at 11:11

## 2021-09-09 RX ADMIN — ALBUTEROL SULFATE 2 PUFF: 90 AEROSOL, METERED RESPIRATORY (INHALATION) at 23:32

## 2021-09-09 RX ADMIN — GUAIFENESIN 600 MG: 600 TABLET, EXTENDED RELEASE ORAL at 10:26

## 2021-09-09 RX ADMIN — DOCUSATE SODIUM 100 MG: 100 CAPSULE, LIQUID FILLED ORAL at 10:28

## 2021-09-09 RX ADMIN — ALBUTEROL SULFATE 2 PUFF: 90 AEROSOL, METERED RESPIRATORY (INHALATION) at 14:43

## 2021-09-09 RX ADMIN — RIVAROXABAN 20 MG: 20 TABLET, FILM COATED ORAL at 19:07

## 2021-09-09 RX ADMIN — Medication 600 MG: at 21:48

## 2021-09-09 RX ADMIN — FLUOXETINE 40 MG: 20 CAPSULE ORAL at 10:27

## 2021-09-09 RX ADMIN — BUDESONIDE AND FORMOTEROL FUMARATE DIHYDRATE 2 PUFF: 160; 4.5 AEROSOL RESPIRATORY (INHALATION) at 06:49

## 2021-09-09 RX ADMIN — Medication 1000 UNITS: at 10:26

## 2021-09-09 RX ADMIN — FUROSEMIDE 20 MG: 10 INJECTION INTRAMUSCULAR; INTRAVENOUS at 10:28

## 2021-09-09 RX ADMIN — DOCUSATE SODIUM 100 MG: 100 CAPSULE, LIQUID FILLED ORAL at 21:48

## 2021-09-09 NOTE — CASE MANAGEMENT/SOCIAL WORK
Continued Stay Note   [place]     Patient Name: Danita Montiel  MRN: [id number]  Today's Date: 9/9/2021    Admit Date: 8/29/2021    Discharge Plan     Row Name 09/09/21 1137       Plan    Plan Comments  DC barriers: on 8L HF        Expected Discharge Date and Time     Expected Discharge Date Expected Discharge Time    Sep 13, 2021         Phone communication or documentation only - no physical contact with patient or family.      Ammy Guillen RN

## 2021-09-09 NOTE — THERAPY TREATMENT NOTE
Subjective: Pt agreeable to therapeutic plan of care. Pt stated she did some standing and forward and backward steps with walker and her     Objective:     Bed mobility - pt already up in the chair. Pt reported her  got her up to the chair earlier  Transfers - CGA and with rolling walker  Ambulation - 38 feet CGA and with rolling walker  On 6L HF nc.     sats 100% pregait and 95% after gait with roll walker    Pain: 0 VAS  Education: Provided education on importance of mobility and skilled verbal / tactile cueing throughout intervention.     Assessment: Danita Montiel presents with functional mobility impairments which indicate the need for skilled intervention. Tolerating session today without incident. Pt now on HF nc and was able to progress to gait training with roll walker today.  Pt's  also taking an active roll with pt getting up out of the bed.  Will continue to follow and progress as tolerated.     Plan/Recommendations:   Pt would benefit from Home with family assist and and Home Health at discharge from facility and requires rolling walker at discharge.   Pt desires Home with family assist and and Home Health at discharge. Pt cooperative; agreeable to therapeutic recommendations and plan of care.     Post-Tx Position: Up in Chair, Alarms activated and Call light and personal items within reach pt eating lunch  PPE: gloves, surgical mask, eyewear protection

## 2021-09-09 NOTE — PROGRESS NOTES
Daily Progress Note    Pneumonia due to COVID-19 virus    Hypoxia    Atrial fibrillation with RVR (CMS/HCC)    Benign hypertension with CKD (chronic kidney disease) stage III (CMS/[facility])    Assessment    Acute hypoxic respiratory failure secondary to COVID-19 Pneumonia     9/6 on PF 25/85 plan discussed with patient and ; OOB for every meal use IS which was demonstrated at bedside with pull of 500 mL  9/7 patient OOB this am with PF down 25/70 9/8 patient OOB on oxygen 8 liters    NEELIMA  Asthma  Hypertension  Hypothyroidism  GERD    Plan    Decadron 6 mg d  Vit C/Zinc  Antibiotic doxycycline and rocephin  Lasix 20 bid  Mucomyst/mucinex  OOB for meals encourage IS  Bronchodilator/Inhaled corticosteroids  Gycemic control  Regular diet with supplements for less than 50% eaten at meals  Anticoagulation rivaroxaban    Remdesivir completed      COVID-19 LAB PANEL     COVID-19 test result  COVID19   Date Value Ref Range Status   08/29/2021 Detected (C) Not Detected - Ref. Range Final       COVID-19 Prognostic lab results  WBC with differential  Results from last 7 days   Lab Units 09/09/21  0237 09/08/21  0258 09/07/21  0331 09/06/21 0217 09/05/21 0223 09/04/21  0312 09/03/21  0241   WBC 10*3/mm3 13.60* 11.90* 15.00* 14.70* 15.20* 15.10* 13.90*   PLATELETS 10*3/mm3 394 365 446 408 365 345 324   NEUTROPHIL % %  --   --   --   --   --   --  86.9*   LYMPHOCYTE % %  --   --   --   --   --   --  6.1*   NEUTROS ABS 10*3/mm3 9.52* 10.59* 13.05* 12.94* 13.68* 12.53* 12.10*   LYMPHS ABS 10*3/mm3 2.72 0.71 1.35 1.03 0.61* 1.51  --      Inflammatory markers  Results from last 7 days   Lab Units 09/09/21  0237 09/08/21  0258 09/07/21  0331 09/06/21 0217 09/05/21 0223 09/04/21  0312 09/03/21  0241   CRP mg/dL <0.30 0.46 0.93* 0.81* 1.18* 1.53* 2.56*   D DIMER QUANT mg/L (FEU) 1.51* 1.93* 2.71* 1.68* 0.96* 0.83* 0.51   ALK PHOS U/L  --  68 81 70 67 71 62   AST (SGOT) U/L  --  15 19 20 19 22 21   ALT (SGPT) U/L  --  15 17 16 15  16 17       Poor COVID-19 outcomes associated with:  Neutrophil:Lymphocyte ratio >3.5  Thrombocytopenia  LFT's >5x upper limit of normal  LDH >400   LOS: 11 days       Subjective         Objective     Vital signs for last 24 hours:  Vitals:    09/09/21 0143 09/09/21 0309 09/09/21 0312 09/09/21 0605   BP: 128/62   126/60   BP Location: Right arm   Right arm   Patient Position: Lying   Lying   Pulse: 59 65 67 63   Resp: 18 20 20 18   Temp: 97.5 °F (36.4 °C)   97.6 °F (36.4 °C)   TempSrc: Oral   Oral   SpO2: 97% 96% 97% 100%   Weight:    123 kg (270 lb 1 oz)   Height:           Intake/Output last 3 shifts:  I/O last 3 completed shifts:  In: 1680 [P.O.:1680]  Out: 4800 [Urine:4800]  Intake/Output this shift:  I/O this shift:  In: 240 [P.O.:240]  Out: 750 [Urine:750]      Radiology  Imaging Results (Last 24 Hours)     ** No results found for the last 24 hours. **          Labs:  Results from last 7 days   Lab Units 09/09/21  0237   WBC 10*3/mm3 13.60*   HEMOGLOBIN g/dL 11.5*   HEMATOCRIT % 33.9*   PLATELETS 10*3/mm3 394     Results from last 7 days   Lab Units 09/08/21  0258   SODIUM mmol/L 135*   POTASSIUM mmol/L 5.1   CHLORIDE mmol/L 95*   CO2 mmol/L 33.0*   BUN mg/dL 24*   CREATININE mg/dL 0.92   CALCIUM mg/dL 8.9   BILIRUBIN mg/dL 0.3   ALK PHOS U/L 68   ALT (SGPT) U/L 15   AST (SGOT) U/L 15   GLUCOSE mg/dL 138*         Results from last 7 days   Lab Units 09/08/21  0258 09/07/21  0331 09/06/21  0217   ALBUMIN g/dL 2.90* 3.40* 3.00*             Results from last 7 days   Lab Units 09/07/21  0331   MAGNESIUM mg/dL 2.2                   Meds:   SCHEDULE  Acetylcysteine, 600 mg, Oral, BID  albuterol sulfate HFA, 2 puff, Inhalation, Q4H - RT  ascorbic acid, 500 mg, Oral, Daily  budesonide-formoterol, 2 puff, Inhalation, BID - RT  cefTRIAXone, 1 g, Intravenous, Q24H  cholecalciferol, 1,000 Units, Oral, Daily  dexamethasone, 6 mg, Intravenous, Daily  dilTIAZem CD, 120 mg, Oral, Q24H  docusate sodium, 100 mg, Oral,  BID  FLUoxetine, 40 mg, Oral, Daily  furosemide, 20 mg, Intravenous, Q12H  guaiFENesin, 600 mg, Oral, Q12H  insulin lispro, 0-9 Units, Subcutaneous, TID AC  levothyroxine, 112 mcg, Oral, Q AM  montelukast, 10 mg, Oral, Nightly  pantoprazole, 40 mg, Oral, QAM  polyethylene glycol, 17 g, Oral, Daily  rivaroxaban, 20 mg, Oral, Daily With Dinner  sodium chloride, 3 mL, Intravenous, Q12H  traZODone, 50 mg, Oral, Nightly  zinc sulfate, 220 mg, Oral, Daily      Infusions     PRNs  benzonatate  •  dextrose  •  dextrose  •  glucagon (human recombinant)  •  HYDROcod Polst-CPM Polst ER  •  insulin lispro **AND** insulin lispro  •  LORazepam  •  melatonin  •  metoprolol tartrate  •  Morphine  •  ondansetron  •  sodium chloride  •  [COMPLETED] Insert peripheral IV **AND** sodium chloride  •  sodium chloride  •  zolpidem    Physical Exam:  Physical Exam  Vitals reviewed.   Constitutional:       Appearance: She is ill-appearing.   Pulmonary:      Breath sounds: Rhonchi present.   Skin:     General: Skin is warm and dry.   Neurological:      Mental Status: She is alert and oriented to person, place, and time.         ROS  Review of Systems   Constitutional: Positive for activity change, appetite change and fatigue.   Respiratory: Positive for cough and shortness of breath.        I reviewed the patient's new clinical results.      Electronically signed by TOMMY Barrett, 09/09/21 at 06:51 EDT.

## 2021-09-09 NOTE — PLAN OF CARE
Goal Outcome Evaluation:     Bed mobility - pt already up in the chair. Pt reported her  got her up to the chair earlier  Transfers - CGA and with rolling walker  Ambulation - 38 feet CGA and with rolling walker  On 6L HF nc.     sats 100% pregait and 95% after gait with roll walker    Pt would benefit from Home with family assist and and Home Health at discharge from facility and requires rolling walker at discharge.   Pt desires Home with family assist and and Home Health at discharge. Pt cooperative; agreeable to therapeutic recommendations and plan of care.

## 2021-09-09 NOTE — PLAN OF CARE
Patient has rested in bed during shift. Patient has experienced no acute events. Will continue to monitor.   Goal Outcome Evaluation:  Plan of Care Reviewed With: patient        Progress: improving

## 2021-09-09 NOTE — PAYOR COMM NOTE
"Clinical update for case# [id number]    Patient remains on PCU requiring high flow oxygen at 8L to maintain sats   IV Rocephin, IV Lasix BID, IV Steroid.   Updated clinical and MD notes attached.  --------  CONTINUED STAY AUTHORIZATION PENDING:   PLEASE FAX OR CALL DETERMINATION TO CONTACT BELOW:     THANK YOU,    WILLEM ChaseN, RN  Utilization Review  [facility]  Phone: [phone]  Fax: [phone]      NPI: [id number]  Tax ID: [id number]    Danita Montiel (56 y.o. Female)     Date of Birth Social Security Number Address Home Phone MRN    1965  [id number]  [place] [address] [phone] [id number]    Jehovah's witness Marital Status          Nondenominational        Admission Date Admission Type Admitting Provider Attending Provider Department, Room/Bed    8/29/21 Emergency Korin Lopez MD Lowney, Kay, MD [facility] PROGRESS CARE, 2111/1    Discharge Date Discharge Disposition Discharge Destination                       Attending Provider: Korin Lopez MD    Allergies: Clarithromycin    Isolation: Enh Drop/Con   Infection: COVID (confirmed) (08/29/21)   Code Status: CPR    Ht: 167.6 cm (66\")   Wt: 123 kg (270 lb 1 oz)    Admission Cmt: None   Principal Problem: Pneumonia due to COVID-19 virus [U07.1,J12.82]                 Active Insurance as of 8/29/2021     Primary Coverage     Payor Plan Insurance Group Employer/Plan Group    TIFFNAIESHANNEN TIFFANIESHANNEN [id number]     Payor Plan Address Payor Plan Phone Number Payor Plan Fax Number Effective Dates    [address] [phone]  4/1/2004 - None Entered    [facility] [address]       Subscriber Name Subscriber Birth Date Member ID       MELODY MONTIEL S 2/3/1962 [id number]                 Emergency Contacts      (Rel.) Home Phone Work Phone Mobile Phone    MELODY MONTIEL (Spouse) [phone] -- [phone]            Vital Signs (last day)     Date/Time   Temp   Temp src   Pulse   Resp   BP   Patient Position   SpO2 "    09/09/21 0649   --   --   69   18   --   --   99    09/09/21 0605   97.6 (36.4)   Oral   63   18   126/60   Lying   100    09/09/21 0312   --   --   67   20   --   --   97    09/09/21 0309   --   --   65   20   --   --   96    09/09/21 0143   97.5 (36.4)   Oral   59   18   128/62   Lying   97    09/08/21 2245   --   --   91   20   --   --   93    09/08/21 2240   --   --   97   20   --   --   93    09/08/21 2214   97 (36.1)   Oral   86   20   148/61   Lying   96    09/08/21 1941   --   --   93   20   --   --   95    09/08/21 1939   --   --   89   20   --   --   94    09/08/21 1937   --   --   91   20   --   --   98    09/08/21 1700   98.1 (36.7)   --   --   --   --   --   --    09/08/21 1527   --   --   80   18   --   --   95    09/08/21 1400   97.9 (36.6)   Oral   58   18   120/56   --   95    09/08/21 1000   98.1 (36.7)   Oral   59   18   143/83   --   92    09/08/21 0726   --   --   78   18   --   --   95    09/08/21 0531   97.3 (36.3)   Oral   57   18   123/60   Lying   95    09/08/21 0317   --   --   56   18   --   --   97    09/08/21 0315   --   --   57   18   --   --   97    09/08/21 0229   97 (36.1)   Oral   58   18   131/74   Lying   94              Oxygen Therapy (last day)     Date/Time   SpO2   Device (Oxygen Therapy)   Flow (L/min)   Oxygen Concentration (%)   ETCO2 (mmHg)    09/09/21 0649   99   high-flow nasal cannula   8   --   --    09/09/21 0605   100   high-flow nasal cannula   8   --   --    09/09/21 0400   --   high-flow nasal cannula   8   --   --    09/09/21 0312   97   high-flow nasal cannula   8   --   --    09/09/21 0309   96   high-flow nasal cannula;humidified   8   --   --    09/09/21 0143   97   high-flow nasal cannula   8   --   --    09/09/21 0000   --   high-flow nasal cannula   8   --   --    09/08/21 2245   93   high-flow nasal cannula   8   --   --    09/08/21 2240   93   high-flow nasal cannula   8   --   --    09/08/21 2214   96   high-flow nasal cannula   8   --   --     09/08/21 2000   --   high-flow nasal cannula;humidified   8   --   --    09/08/21 1941   95   high-flow nasal cannula   8   --   --    09/08/21 1939   94   high-flow nasal cannula   8   --   --    09/08/21 1937   98   high-flow nasal cannula;nasal cannula   8   --   --    09/08/21 1527   95   high-flow nasal cannula;humidified   8   --   --    09/08/21 1400   95   --   --   --   --    09/08/21 1000   92   humidified;high-flow nasal cannula   8   --   --    09/08/21 0800   --   humidified;high-flow nasal cannula   8   --   --    09/08/21 0726   95   humidified;high-flow nasal cannula   8   --   --    09/08/21 0531   95   humidified;high-flow nasal cannula   --   --   --    09/08/21 0421   --   humidified;high-flow nasal cannula   8   --   --    09/08/21 0317   97   humidified;high-flow nasal cannula   8   --   --    09/08/21 0315   97   humidified;high-flow nasal cannula   10   --   --    09/08/21 0229   94   high-flow nasal cannula   --   --   --              Current Facility-Administered Medications   Medication Dose Route Frequency Provider Last Rate Last Admin   • Acetylcysteine capsule 600 mg  600 mg Oral BID Yissel Gardiner APRN   600 mg at 09/08/21 2106   • albuterol sulfate HFA (PROVENTIL HFA;VENTOLIN HFA;PROAIR HFA) inhaler 2 puff  2 puff Inhalation Q4H - RT Korin Lopez MD   2 puff at 09/09/21 0649   • ascorbic acid (VITAMIN C) tablet 500 mg  500 mg Oral Daily Korin Lopez MD   500 mg at 09/08/21 0912   • benzonatate (TESSALON) capsule 200 mg  200 mg Oral TID PRN Korin Lopez MD   200 mg at 09/07/21 2126   • budesonide-formoterol (SYMBICORT) 160-4.5 MCG/ACT inhaler 2 puff  2 puff Inhalation BID - RT Mathieu Mars MD   2 puff at 09/09/21 0649   • cefTRIAXone (ROCEPHIN) 1 g in sodium chloride 0.9 % 100 mL IVPB  1 g Intravenous Q24H Hubert Colbert  mL/hr at 09/08/21 1401 1 g at 09/08/21 1401   • cholecalciferol (VITAMIN D3) tablet 1,000 Units  1,000 Units Oral Daily Korin Lopez MD   1,000 Units at  09/08/21 0912   • dexamethasone (DECADRON) injection 6 mg  6 mg Intravenous Daily Yissel Gardiner APRN       • dextrose (D50W) 25 g/ 50mL Intravenous Solution 25 g  25 g Intravenous Q15 Min PRN Jessica Donis APRN       • dextrose (GLUTOSE) oral gel 15 g  15 g Oral Q15 Min PRN Jessica Donis APRN       • dilTIAZem CD (CARDIZEM CD) 24 hr capsule 120 mg  120 mg Oral Q24H Brennen Tobar MD   120 mg at 09/08/21 0912   • docusate sodium (COLACE) capsule 100 mg  100 mg Oral BID Shana Diaz MD   100 mg at 09/08/21 2106   • FLUoxetine (PROzac) capsule 40 mg  40 mg Oral Daily Korin Lopez MD   40 mg at 09/08/21 0912   • furosemide (LASIX) injection 20 mg  20 mg Intravenous Q12H Jessica Donis APRN   20 mg at 09/08/21 2105   • glucagon (human recombinant) (GLUCAGEN DIAGNOSTIC) 1 mg in sterile water (preservative free) 1 mL injection  1 mg Subcutaneous Q15 Min PRN Jessica Donis APRN       • guaiFENesin (MUCINEX) 12 hr tablet 600 mg  600 mg Oral Q12H Yissel Gardiner APRN   600 mg at 09/08/21 2106   • HYDROcod Polst-CPM Polst ER (TUSSIONEX PENNKINETIC) 10-8 MG/5ML ER suspension 5 mL  5 mL Oral Q12H PRN Yissel Gardiner APRN   5 mL at 09/08/21 2106   • insulin lispro (ADMELOG) injection 0-9 Units  0-9 Units Subcutaneous TID AC Jessica Donis APRN   2 Units at 09/08/21 1254    And   • insulin lispro (ADMELOG) injection 0-9 Units  0-9 Units Subcutaneous PRN Jessica Donis APRN       • levothyroxine (SYNTHROID, LEVOTHROID) tablet 112 mcg  112 mcg Oral Q AM oKrin Lopez MD   112 mcg at 09/09/21 0532   • LORazepam (ATIVAN) tablet 1 mg  1 mg Oral Q6H PRN Hubert Colbert MD   1 mg at 09/04/21 2106   • melatonin tablet 5 mg  5 mg Oral Nightly PRN Korin Lopez MD   5 mg at 09/04/21 2106   • metoprolol tartrate (LOPRESSOR) injection 5 mg  5 mg Intravenous Q4H PRN Korin Lopez MD   5 mg at 09/01/21 0956   • montelukast (SINGULAIR) tablet 10 mg  10 mg Oral Nightly Korin Lopez MD   10 mg at 09/08/21 2106    • Morphine sulfate (PF) injection 2 mg  2 mg Intravenous Q2H PRN Shana Diaz MD   2 mg at 09/04/21 0208   • ondansetron (ZOFRAN) tablet 4 mg  4 mg Oral Q6H PRN Korin Lopez MD   4 mg at 09/05/21 0959   • pantoprazole (PROTONIX) EC tablet 40 mg  40 mg Oral QAM Korin Lopez MD   40 mg at 09/09/21 0532   • polyethylene glycol (MIRALAX) packet 17 g  17 g Oral Daily Shana Diaz MD   17 g at 09/08/21 0911   • rivaroxaban (XARELTO) tablet 20 mg  20 mg Oral Daily With Dinner Korin Lopez MD   20 mg at 09/08/21 1713   • sodium chloride 0.9 % flush 10 mL  10 mL Intravenous PRN Korin Lopez MD       • sodium chloride 0.9 % flush 10 mL  10 mL Intravenous PRN Korin Lopez MD       • sodium chloride 0.9 % flush 3 mL  3 mL Intravenous Q12H Korin Lopez MD   3 mL at 09/08/21 2107   • sodium chloride 0.9 % flush 3-10 mL  3-10 mL Intravenous PRN Korin Lopez MD       • traZODone (DESYREL) tablet 50 mg  50 mg Oral Nightly Korin Lopez MD   50 mg at 09/08/21 2106   • zinc sulfate (ZINCATE) capsule 220 mg  220 mg Oral Daily Korin Lopez MD   220 mg at 09/08/21 0912   • zolpidem (AMBIEN) tablet 5 mg  5 mg Oral Nightly PRN Korin Lopez MD         Lab Results (last 24 hours)     Procedure Component Value Units Date/Time    POC Glucose Once [[id number]]  (Normal) Collected: 09/08/21 0726    Specimen: Blood Updated: 09/09/21 0822     Glucose 105 mg/dL      Comment: Serial Number: [id number]:  [id number]       Comprehensive Metabolic Panel [[id number]]  (Abnormal) Collected: 09/09/21 0642    Specimen: Blood Updated: 09/09/21 0715     Glucose 87 mg/dL      BUN 31 mg/dL      Creatinine 0.88 mg/dL      Sodium 134 mmol/L      Potassium 4.4 mmol/L      Comment: Slight hemolysis detected by analyzer. Results may be affected.        Chloride 95 mmol/L      CO2 32.0 mmol/L      Calcium 8.5 mg/dL      Total Protein 5.7 g/dL      Albumin 3.00 g/dL      ALT (SGPT) 18 U/L      AST (SGOT) 23 U/L      Alkaline Phosphatase 74 U/L      Total  Bilirubin 0.2 mg/dL      eGFR Non African Amer 66 mL/min/1.73      Globulin 2.7 gm/dL      A/G Ratio 1.1 g/dL      BUN/Creatinine Ratio 35.2     Anion Gap 7.0 mmol/L     Narrative:      GFR Normal >60  Chronic Kidney Disease <60  Kidney Failure <15      POC Glucose Once [[id number]]  (Normal) Collected: 09/09/21 0658    Specimen: Blood Updated: 09/09/21 0708     Glucose 84 mg/dL      Comment: Serial Number: [id number]:  [id number]       Manual Differential [[id number]]  (Abnormal) Collected: 09/09/21 0237    Specimen: Blood Updated: 09/09/21 0347     Neutrophil % 70.0 %      Lymphocyte % 20.0 %      Monocyte % 8.0 %      Eosinophil % 1.0 %      Myelocyte % 1.0 %      Neutrophils Absolute 9.52 10*3/mm3      Lymphocytes Absolute 2.72 10*3/mm3      Monocytes Absolute 1.09 10*3/mm3      Eosinophils Absolute 0.14 10*3/mm3      RBC Morphology Normal     WBC Morphology Normal     Platelet Morphology Normal    CBC & Differential [[id number]]  (Abnormal) Collected: 09/09/21 0237    Specimen: Blood Updated: 09/09/21 0347    Narrative:      The following orders were created for panel order CBC & Differential.  Procedure                               Abnormality         Status                     ---------                               -----------         ------                     CBC Auto Differential[[id number]]        Abnormal            Final result               Scan Slide[[id number]]                                       Final result                 Please view results for these tests on the individual orders.    Scan Slide [[id number]] Collected: 09/09/21 0237    Specimen: Blood Updated: 09/09/21 0347     Scan Slide --     Comment: See Manual Differential Results       CBC Auto Differential [[id number]]  (Abnormal) Collected: 09/09/21 0237    Specimen: Blood Updated: 09/09/21 0347     WBC 13.60 10*3/mm3      RBC 3.62 10*6/mm3      Hemoglobin 11.5 g/dL      Hematocrit 33.9 %      MCV 93.5 fL      MCH 31.8 pg      MCHC  34.0 g/dL      RDW 13.4 %      RDW-SD 43.3 fl      MPV 8.1 fL      Platelets 394 10*3/mm3     Narrative:      The previously reported component NRBC is no longer being reported. Previous result was 0.0 /100 WBC (Reference Range: 0.0-0.2 /100 WBC) on 9/9/2021 at 0308 EDT.    C-reactive Protein [[id number]]  (Normal) Collected: 09/09/21 0237    Specimen: Blood Updated: 09/09/21 0344     C-Reactive Protein <0.30 mg/dL     Bilirubin, Direct [[id number]]  (Normal) Collected: 09/09/21 0237    Specimen: Blood Updated: 09/09/21 0330     Bilirubin, Direct <0.2 mg/dL     D-dimer, Quantitative [[id number]]  (Abnormal) Collected: 09/09/21 0237    Specimen: Blood Updated: 09/09/21 0310     D-Dimer, Quantitative 1.51 mg/L (FEU)     Narrative:      Reference Range  --------------------------------------------------------------------     < 0.50   Negative Predictive Value  0.50-0.59   Indeterminate    >= 0.60   Probable VTE             A very low percentage of patients with DVT may yield D-Dimer results   below the cut-off of 0.50 mg/L FEU.  This is known to be more   prevalent in patients with distal DVT.             Results of this test should always be interpreted in conjunction with   the patient's medical history, clinical presentation and other   findings.  Clinical diagnosis should not be based on the result of   INNOVANCE D-Dimer alone.    POC Glucose Once [[id number]]  (Abnormal) Collected: 09/08/21 1930    Specimen: Blood Updated: 09/08/21 1931     Glucose 146 mg/dL      Comment: Serial Number: [id number]:  [id number]       POC Glucose Once [[id number]]  (Abnormal) Collected: 09/08/21 1630    Specimen: Blood Updated: 09/08/21 1631     Glucose 109 mg/dL      Comment: Serial Number: [id number]:  [id number]           Imaging Results (Most Recent)     Procedure Component Value Units Date/Time    XR Chest 1 View [[id number]] Collected: 09/07/21 1209     Updated: 09/07/21 1213    Narrative:      DATE OF EXAM:  9/7/2021  11:45 AM     PROCEDURE:  XR CHEST 1 VW-     INDICATIONS:  follow up pneumonia; R06.00-Dyspnea, unspecified; U07.1-COVID-19;  U07.1-COVID-19; J12.82-Pneumonia due to Coronavirus disease 2019;  R09.02-Hypoxemia     COMPARISON:  9/1/2021     TECHNIQUE:   Single radiographic view of the chest was obtained.     FINDINGS:  There is residual airspace opacity in the peripheral right lung, left  perihilar lung and left base. Overall, this is somewhat improved as  compared to prior. No pneumothorax is evident. Heart size and  mediastinal contour appear within normal limits. Question trace left  pleural effusion.       Impression:      There has been some interval improvement in previously seen multifocal  airspace opacities.     Electronically Signed By-Ambrose Levy MD On:9/7/2021 12:11 PM  This report was finalized on [id number] by  Ambrose Levy MD.    XR Abdomen KUB [[id number]] Collected: 09/02/21 2327     Updated: 09/02/21 2331    Narrative:      DATE OF EXAM:  9/2/2021 11:04 PM     PROCEDURE:  XR ABDOMEN KUB-     INDICATIONS:  RUQ pain; R06.00-Dyspnea, unspecified; U07.1-COVID-19; U07.1-COVID-19;  J12.82-Pneumonia due to Coronavirus disease 2019; R09.02-Hypoxemia     COMPARISON:  Portable chest 09/01/2021     TECHNIQUE:   Single radiographic view of the abdomen was obtained.     FINDINGS:  There are right upper quadrant surgical clips. There is scattered gas  and stool in the colon. There is no gaseous distention of small bowel.  There is no gross soft tissue mass or abnormal calcification. There are  bilateral lower lung opacities greater on left than right, grossly  similar to the prior chest x-ray. There are right upper quadrant  surgical clips.       Impression:      1. Unremarkable bowel gas pattern.      Electronically Signed By-Breanna Sanchez MD On:9/2/2021 11:29 PM  This report was finalized on [id number] by  Breanna Sanchez MD.    XR Chest 1 View [[id number]] Collected: 09/01/21 0759     Updated: 09/01/21  0802    Narrative:      XR CHEST 1 VW-     Date of Exam: 9/1/2021 5:56 AM     Indication: Shortness of breath; R06.00-Dyspnea, unspecified;  U07.1-COVID-19; U07.1-COVID-19; J12.82-Pneumonia due to Coronavirus  disease 2019; R09.02-Hypoxemia.     Comparison Exams: August 31, 2021     Technique: Single AP chest radiograph     FINDINGS:  Patchy bilateral airspace opacities appear slightly worse on the left.  The heart and mediastinal contours appear stable. There is a small left  pleural effusion. The osseous structures appear intact.       Impression:      1.Patchy bilateral airspace opacities appear slightly worse on the left,  suggesting pneumonia.  2.Small left pleural effusion.     Electronically Signed By-Alex Adame MD On:9/1/2021 7:59 AM  This report was finalized on [id number] by  Alex Adame MD.    XR Chest 1 View [[id number]] Collected: 08/31/21 0748     Updated: 08/31/21 0751    Narrative:      XR CHEST 1 VW-     Date of Exam: 8/31/2021 5:02 AM     Indication: Shortness of breath; R06.00-Dyspnea, unspecified;  U07.1-COVID-19; U07.1-COVID-19; J12.82-Pneumonia due to Coronavirus  disease 2019; R09.02-Hypoxemia.     Comparison Exams: August 29, 2021     Technique: Single AP chest radiograph     FINDINGS:  Patchy bilateral airspace opacities appear slightly worse. The heart and  mediastinal contours appear stable. The osseous structures appear  intact.       Impression:      Patchy bilateral airspace opacities appear slightly worse, suggesting  pneumonia.     Electronically Signed By-Alex Adame MD On:8/31/2021 7:49 AM  This report was finalized on [id number] by  Alex Adame MD.    XR Chest 1 View [[id number]] Collected: 08/29/21 1307     Updated: 08/29/21 1310    Narrative:      DATE OF EXAM:  8/29/2021 12:40 PM     PROCEDURE:  XR CHEST 1 VW-     INDICATIONS:  Hypoxia, Covid 19 infection, shortness of breath.      COMPARISON:  4/17/2019.     TECHNIQUE:   Single radiographic view of  the chest was obtained.     FINDINGS:  There are patchy areas of consolidation and increased interstitial  markings consistent with multifocal pneumonia as seen with the Covid 19  infection. The lungs and pleural spaces are otherwise clear. The heart  size is normal.  There is degenerative spondylosis of the thoracic  spine.       Impression:      Abnormal appearance of the lungs which can be seen with Covid 19  pneumonia.     Electronically Signed By-Hubert Campbell MD On:8/29/2021 1:08 PM  This report was finalized on [id number] by  Hubert Campbell MD.           Physician Progress Notes (last 24 hours) (Notes from 09/08/21 0935 through 09/09/21 0935)      Yissel Gardiner, APRN at 09/09/21 0651          Daily Progress Note    Pneumonia due to COVID-19 virus    Hypoxia    Atrial fibrillation with RVR (CMS/HCC)    Benign hypertension with CKD (chronic kidney disease) stage III (CMS/[facility])    Assessment    Acute hypoxic respiratory failure secondary to COVID-19 Pneumonia     9/6 on PF 25/85 plan discussed with patient and ; OOB for every meal use IS which was demonstrated at bedside with pull of 500 mL  9/7 patient OOB this am with PF down 25/70 9/8 patient OOB on oxygen 8 liters    NEELIMA  Asthma  Hypertension  Hypothyroidism  GERD    Plan    Decadron 6 mg d  Vit C/Zinc  Antibiotic doxycycline and rocephin  Lasix 20 bid  Mucomyst/mucinex  OOB for meals encourage IS  Bronchodilator/Inhaled corticosteroids  Gycemic control  Regular diet with supplements for less than 50% eaten at meals  Anticoagulation rivaroxaban    Remdesivir completed      COVID-19 LAB PANEL     COVID-19 test result  COVID19   Date Value Ref Range Status   08/29/2021 Detected (C) Not Detected - Ref. Range Final       COVID-19 Prognostic lab results  WBC with differential  Results from last 7 days   Lab Units 09/09/21  0237 09/08/21  0258 09/07/21  0331 09/06/21  0217 09/05/21  0223 09/04/21  0312 09/03/21  0241   WBC 10*3/mm3 13.60* 11.90* 15.00*  14.70* 15.20* 15.10* 13.90*   PLATELETS 10*3/mm3 394 365 446 408 365 345 324   NEUTROPHIL % %  --   --   --   --   --   --  86.9*   LYMPHOCYTE % %  --   --   --   --   --   --  6.1*   NEUTROS ABS 10*3/mm3 9.52* 10.59* 13.05* 12.94* 13.68* 12.53* 12.10*   LYMPHS ABS 10*3/mm3 2.72 0.71 1.35 1.03 0.61* 1.51  --      Inflammatory markers  Results from last 7 days   Lab Units 09/09/21  0237 09/08/21  0258 09/07/21  0331 09/06/21  0217 09/05/21  0223 09/04/21  0312 09/03/21  0241   CRP mg/dL <0.30 0.46 0.93* 0.81* 1.18* 1.53* 2.56*   D DIMER QUANT mg/L (FEU) 1.51* 1.93* 2.71* 1.68* 0.96* 0.83* 0.51   ALK PHOS U/L  --  68 81 70 67 71 62   AST (SGOT) U/L  --  15 19 20 19 22 21   ALT (SGPT) U/L  --  15 17 16 15 16 17       Poor COVID-19 outcomes associated with:  Neutrophil:Lymphocyte ratio >3.5  Thrombocytopenia  LFT's >5x upper limit of normal  LDH >400   LOS: 11 days       Subjective         Objective     Vital signs for last 24 hours:  Vitals:    09/09/21 0143 09/09/21 0309 09/09/21 0312 09/09/21 0605   BP: 128/62   126/60   BP Location: Right arm   Right arm   Patient Position: Lying   Lying   Pulse: 59 65 67 63   Resp: 18 20 20 18   Temp: 97.5 °F (36.4 °C)   97.6 °F (36.4 °C)   TempSrc: Oral   Oral   SpO2: 97% 96% 97% 100%   Weight:    123 kg (270 lb 1 oz)   Height:           Intake/Output last 3 shifts:  I/O last 3 completed shifts:  In: 1680 [P.O.:1680]  Out: 4800 [Urine:4800]  Intake/Output this shift:  I/O this shift:  In: 240 [P.O.:240]  Out: 750 [Urine:750]      Radiology  Imaging Results (Last 24 Hours)     ** No results found for the last 24 hours. **          Labs:  Results from last 7 days   Lab Units 09/09/21  0237   WBC 10*3/mm3 13.60*   HEMOGLOBIN g/dL 11.5*   HEMATOCRIT % 33.9*   PLATELETS 10*3/mm3 394     Results from last 7 days   Lab Units 09/08/21  0258   SODIUM mmol/L 135*   POTASSIUM mmol/L 5.1   CHLORIDE mmol/L 95*   CO2 mmol/L 33.0*   BUN mg/dL 24*   CREATININE mg/dL 0.92   CALCIUM mg/dL 8.9    BILIRUBIN mg/dL 0.3   ALK PHOS U/L 68   ALT (SGPT) U/L 15   AST (SGOT) U/L 15   GLUCOSE mg/dL 138*         Results from last 7 days   Lab Units 09/08/21  0258 09/07/21  0331 09/06/21  0217   ALBUMIN g/dL 2.90* 3.40* 3.00*             Results from last 7 days   Lab Units 09/07/21  0331   MAGNESIUM mg/dL 2.2                   Meds:   SCHEDULE  Acetylcysteine, 600 mg, Oral, BID  albuterol sulfate HFA, 2 puff, Inhalation, Q4H - RT  ascorbic acid, 500 mg, Oral, Daily  budesonide-formoterol, 2 puff, Inhalation, BID - RT  cefTRIAXone, 1 g, Intravenous, Q24H  cholecalciferol, 1,000 Units, Oral, Daily  dexamethasone, 6 mg, Intravenous, Daily  dilTIAZem CD, 120 mg, Oral, Q24H  docusate sodium, 100 mg, Oral, BID  FLUoxetine, 40 mg, Oral, Daily  furosemide, 20 mg, Intravenous, Q12H  guaiFENesin, 600 mg, Oral, Q12H  insulin lispro, 0-9 Units, Subcutaneous, TID AC  levothyroxine, 112 mcg, Oral, Q AM  montelukast, 10 mg, Oral, Nightly  pantoprazole, 40 mg, Oral, QAM  polyethylene glycol, 17 g, Oral, Daily  rivaroxaban, 20 mg, Oral, Daily With Dinner  sodium chloride, 3 mL, Intravenous, Q12H  traZODone, 50 mg, Oral, Nightly  zinc sulfate, 220 mg, Oral, Daily      Infusions     PRNs  benzonatate  •  dextrose  •  dextrose  •  glucagon (human recombinant)  •  HYDROcod Polst-CPM Polst ER  •  insulin lispro **AND** insulin lispro  •  LORazepam  •  melatonin  •  metoprolol tartrate  •  Morphine  •  ondansetron  •  sodium chloride  •  [COMPLETED] Insert peripheral IV **AND** sodium chloride  •  sodium chloride  •  zolpidem    Physical Exam:  Physical Exam  Vitals reviewed.   Constitutional:       Appearance: She is ill-appearing.   Pulmonary:      Breath sounds: Rhonchi present.   Skin:     General: Skin is warm and dry.   Neurological:      Mental Status: She is alert and oriented to person, place, and time.         ROS  Review of Systems   Constitutional: Positive for activity change, appetite change and fatigue.   Respiratory:  Positive for cough and shortness of breath.        I reviewed the patient's new clinical results.      Electronically signed by TOMMY Barrett, 09/09/21 at 06:51 EDT.             Electronically signed by Yissel Gardiner APRN at 09/09/21 0921     Jessica Donis APRN at 09/08/21 1041           LOS: 10 days   Patient Care Team:  Jaya Harper MD as PCP - General  GriefJaya MD as PCP - Family Medicine    Subjective     Interval History:    Patient Complaints:  Improving- slept better last night      History taken from: patient    Review of Systems   Constitutional: Positive for activity change and fatigue. Negative for fever.   HENT: Negative for trouble swallowing.    Respiratory: Positive for cough and shortness of breath.    Cardiovascular: Negative for chest pain, palpitations and leg swelling.   Gastrointestinal: Negative for abdominal pain, constipation, diarrhea, nausea and vomiting.   Genitourinary: Negative for difficulty urinating.   Musculoskeletal: Positive for gait problem.   Neurological: Positive for weakness.   Psychiatric/Behavioral: Negative for confusion.           Objective     Vital Signs  Temp:  [97 °F (36.1 °C)-99 °F (37.2 °C)] 97.3 °F (36.3 °C)  Heart Rate:  [] 78  Resp:  [18-22] 18  BP: (123-158)/(60-83) 123/60    Physical Exam:     General Appearance:    Alert, cooperative, in no acute distress,   Head:    Normocephalic, without obvious abnormality, atraumatic   Eyes:            Lids and lashes normal, conjunctivae and sclerae normal, no   icterus, no pallor, corneas clear, PERRLA   Ears:    Ears appear intact with no abnormalities noted   Throat:   No oral lesions, no thrush, oral mucosa moist   Neck:   No adenopathy, supple, trachea midline, no thyromegaly, no   carotid bruit, no JVD   Lungs:     Rhonchi throughout - respirations regular, even and                  unlabored    Heart:    Regular rhythm and normal rate, normal S1 and S2, no            murmur, no  gallop, no rub, no click   Chest Wall:    No abnormalities observed   Abdomen:     Normal bowel sounds, no masses, no organomegaly, soft        Non-tender non-distended, no guarding,   Extremities:   Moves all extremities well, no edema, no cyanosis, no             Redness   Pulses:   Pulses palpable and equal bilaterally   Skin:   No bleeding, bruising or rash   Lymph nodes:   No palpable adenopathy   Neurologic:   Cranial nerves 2 - 12 grossly intact, sensation intact, DTR       present and equal bilaterally        Results Review:    Lab Results (last 24 hours)     Procedure Component Value Units Date/Time    Manual Differential [[id number]]  (Abnormal) Collected: 09/08/21 0258    Specimen: Blood Updated: 09/08/21 0736     Neutrophil % 89.0 %      Lymphocyte % 6.0 %      Monocyte % 4.0 %      Eosinophil % 1.0 %      Neutrophils Absolute 10.59 10*3/mm3      Lymphocytes Absolute 0.71 10*3/mm3      Monocytes Absolute 0.48 10*3/mm3      Eosinophils Absolute 0.12 10*3/mm3      RBC Morphology Normal     WBC Morphology Normal     Platelet Morphology Normal    CBC & Differential [[id number]]  (Abnormal) Collected: 09/08/21 0258    Specimen: Blood Updated: 09/08/21 0736    Narrative:      The following orders were created for panel order CBC & Differential.  Procedure                               Abnormality         Status                     ---------                               -----------         ------                     CBC Auto Differential[[id number]]        Abnormal            Final result               Scan Slide[[id number]]                                       Final result                 Please view results for these tests on the individual orders.    Scan Slide [[id number]] Collected: 09/08/21 0258    Specimen: Blood Updated: 09/08/21 0736     Scan Slide --     Comment: See Manual Differential Results       CBC Auto Differential [[id number]]  (Abnormal) Collected: 09/08/21 0258    Specimen: Blood Updated:  09/08/21 0736     WBC 11.90 10*3/mm3      RBC 3.84 10*6/mm3      Hemoglobin 12.0 g/dL      Hematocrit 35.9 %      MCV 93.7 fL      MCH 31.2 pg      MCHC 33.3 g/dL      RDW 13.8 %      RDW-SD 44.6 fl      MPV 8.1 fL      Platelets 365 10*3/mm3     Narrative:      The previously reported component NRBC is no longer being reported. Previous result was 0.0 /100 WBC (Reference Range: 0.0-0.2 /100 WBC) on 9/8/2021 at 0326 EDT.    D-dimer, Quantitative [[id number]]  (Abnormal) Collected: 09/08/21 0258    Specimen: Blood Updated: 09/08/21 0350     D-Dimer, Quantitative 1.93 mg/L (FEU)     Narrative:      Reference Range  --------------------------------------------------------------------     < 0.50   Negative Predictive Value  0.50-0.59   Indeterminate    >= 0.60   Probable VTE             A very low percentage of patients with DVT may yield D-Dimer results   below the cut-off of 0.50 mg/L FEU.  This is known to be more   prevalent in patients with distal DVT.             Results of this test should always be interpreted in conjunction with   the patient's medical history, clinical presentation and other   findings.  Clinical diagnosis should not be based on the result of   IN[place]OV[place] D-Dimer alone.    Comprehensive Metabolic Panel [[id number]]  (Abnormal) Collected: 09/08/21 0258    Specimen: Blood Updated: 09/08/21 0338     Glucose 138 mg/dL      BUN 24 mg/dL      Creatinine 0.92 mg/dL      Sodium 135 mmol/L      Potassium 5.1 mmol/L      Chloride 95 mmol/L      CO2 33.0 mmol/L      Calcium 8.9 mg/dL      Total Protein 5.5 g/dL      Albumin 2.90 g/dL      ALT (SGPT) 15 U/L      AST (SGOT) 15 U/L      Alkaline Phosphatase 68 U/L      Total Bilirubin 0.3 mg/dL      eGFR Non African Amer 63 mL/min/1.73      Globulin 2.6 gm/dL      A/G Ratio 1.1 g/dL      BUN/Creatinine Ratio 26.1     Anion Gap 7.0 mmol/L     Narrative:      GFR Normal >60  Chronic Kidney Disease <60  Kidney Failure <15      C-reactive Protein [[id number]]   (Normal) Collected: 09/08/21 0258    Specimen: Blood Updated: 09/08/21 0338     C-Reactive Protein 0.46 mg/dL     Bilirubin, Direct [[id number]]  (Normal) Collected: 09/08/21 0258    Specimen: Blood Updated: 09/08/21 0338     Bilirubin, Direct <0.2 mg/dL     POC Glucose Once [[id number]]  (Abnormal) Collected: 09/07/21 2103    Specimen: Blood Updated: 09/07/21 2104     Glucose 110 mg/dL      Comment: Serial Number: [id number]:  [id number]       POC Glucose Once [[id number]]  (Abnormal) Collected: 09/07/21 1741    Specimen: Blood Updated: 09/07/21 1742     Glucose 134 mg/dL      Comment: Serial Number: [id number]:  [id number]              Imaging Results (Last 24 Hours)     ** No results found for the last 24 hours. **               I reviewed the patient's new clinical results.    Medication Review:   Scheduled Meds:Acetylcysteine, 600 mg, Oral, BID  albuterol sulfate HFA, 2 puff, Inhalation, Q4H - RT  ascorbic acid, 500 mg, Oral, Daily  budesonide-formoterol, 2 puff, Inhalation, BID - RT  cefTRIAXone, 1 g, Intravenous, Q24H  cholecalciferol, 1,000 Units, Oral, Daily  [START ON 9/9/2021] dexamethasone, 6 mg, Intravenous, Daily  dilTIAZem CD, 120 mg, Oral, Q24H  docusate sodium, 100 mg, Oral, BID  FLUoxetine, 40 mg, Oral, Daily  furosemide, 20 mg, Intravenous, Q12H  guaiFENesin, 600 mg, Oral, Q12H  insulin lispro, 0-9 Units, Subcutaneous, TID AC  levothyroxine, 112 mcg, Oral, Q AM  montelukast, 10 mg, Oral, Nightly  pantoprazole, 40 mg, Oral, QAM  polyethylene glycol, 17 g, Oral, Daily  rivaroxaban, 20 mg, Oral, Daily With Dinner  sodium chloride, 3 mL, Intravenous, Q12H  traZODone, 50 mg, Oral, Nightly  zinc sulfate, 220 mg, Oral, Daily      Continuous Infusions:   PRN Meds:.benzonatate  •  dextrose  •  dextrose  •  glucagon (human recombinant)  •  HYDROcod Polst-CPM Polst ER  •  insulin lispro **AND** insulin lispro  •  LORazepam  •  melatonin  •  metoprolol tartrate  •  Morphine  •  ondansetron  •  sodium  chloride  •  [COMPLETED] Insert peripheral IV **AND** sodium chloride  •  sodium chloride  •  zolpidem     Assessment/Plan       Pneumonia due to COVID-19 virus    Hypoxia    Atrial fibrillation with RVR (CMS/[facility])    Benign hypertension with CKD (chronic kidney disease) stage III (CMS/[facility])    Remdesivir is complete - finishing rocephin . - continue bronchodilator, lasix, pretty , acetylcysteine, zinc     PAF- maintaining in SR on diltiazem - on xarelto - will dc with event monitor and cardio fu     Insomnia- likely related to steroids- trazodone with help     Discussed with pt the need to get out of bed with assistance and use the IS  Plan for disposition: TBD    TOMMY Barrett  21  10:41 EDT          Electronically signed by Jessica Donis APRN at 21 1058     Brennen Tobar MD at 21 0959          [facility]        LOS:  LOS: 10 days   Patient Name: Danita Montiel  Age/Sex: 56 y.o. female  : 1965  MRN: [id number]    Day of Service: 21   Length of Stay: 10  Encounter Provider: TOMMY Baumann  Place of Service: [facility] CARDIOLOGY  Patient Care Team:  Jaya Harper MD as PCP - General  GriefJaya MD as PCP - Family Medicine    Subjective:     Chief Complaint: f/u afib    Subjective: telemetry reviewed. SR on telemetry. No acute cardiac events. HR 70s.  No CV complaints, no CV complaints per nursing staff for acute concerns, continue rate and rhythm control strategy, anticoagulation on board for stroke risk reduction    Current Medications:   Scheduled Meds:Acetylcysteine, 600 mg, Oral, BID  albuterol sulfate HFA, 2 puff, Inhalation, Q4H - RT  ascorbic acid, 500 mg, Oral, Daily  budesonide-formoterol, 2 puff, Inhalation, BID - RT  cefTRIAXone, 1 g, Intravenous, Q24H  cholecalciferol, 1,000 Units, Oral, Daily  [START ON 2021] dexamethasone, 6 mg, Intravenous, Daily  dilTIAZem CD, 120 mg, Oral,  Q24H  docusate sodium, 100 mg, Oral, BID  FLUoxetine, 40 mg, Oral, Daily  furosemide, 20 mg, Intravenous, Q12H  guaiFENesin, 600 mg, Oral, Q12H  insulin lispro, 0-9 Units, Subcutaneous, TID AC  levothyroxine, 112 mcg, Oral, Q AM  montelukast, 10 mg, Oral, Nightly  pantoprazole, 40 mg, Oral, QAM  polyethylene glycol, 17 g, Oral, Daily  rivaroxaban, 20 mg, Oral, Daily With Dinner  sodium chloride, 3 mL, Intravenous, Q12H  traZODone, 50 mg, Oral, Nightly  zinc sulfate, 220 mg, Oral, Daily      Continuous Infusions:     Allergies:  Allergies   Allergen Reactions   • Clarithromycin Itching       ROS      Objective:     Temp:  [97 °F (36.1 °C)-99 °F (37.2 °C)] 97.3 °F (36.3 °C)  Heart Rate:  [] 78  Resp:  [18-22] 18  BP: (123-158)/(60-83) 123/60     Intake/Output Summary (Last 24 hours) at 9/8/2021 0959  Last data filed at 9/8/2021 0229  Gross per 24 hour   Intake 600 ml   Output 2900 ml   Net -2300 ml     Body mass index is 43.73 kg/m².      09/04/21  0530 09/06/21  0500 09/08/21  0229   Weight: 121 kg (266 lb 8.6 oz) 122 kg (269 lb 10 oz) 123 kg (270 lb 15.1 oz)         Physical Exam: Deferred with Covid isolation                                                        Lab Review:   Results from last 7 days   Lab Units 09/08/21  0258 09/07/21  0331   SODIUM mmol/L 135* 136   POTASSIUM mmol/L 5.1 4.3   CHLORIDE mmol/L 95* 95*   CO2 mmol/L 33.0* 32.0*   BUN mg/dL 24* 27*   CREATININE mg/dL 0.92 0.83   GLUCOSE mg/dL 138* 120*   CALCIUM mg/dL 8.9 8.7   AST (SGOT) U/L 15 19   ALT (SGPT) U/L 15 17         Results from last 7 days   Lab Units 09/08/21  0258 09/07/21  0331   WBC 10*3/mm3 11.90* 15.00*   HEMOGLOBIN g/dL 12.0 12.8   HEMATOCRIT % 35.9 38.3   PLATELETS 10*3/mm3 365 446         Results from last 7 days   Lab Units 09/07/21  0331   MAGNESIUM mg/dL 2.2           Invalid input(s): LDLCALC            Recent Radiology:  Imaging Results (Most Recent)     Procedure Component Value Units Date/Time    XR Chest 1 View  [[id number]] Collected: 09/07/21 1209     Updated: 09/07/21 1213    Narrative:      DATE OF EXAM:  9/7/2021 11:45 AM     PROCEDURE:  XR CHEST 1 VW-     INDICATIONS:  follow up pneumonia; R06.00-Dyspnea, unspecified; U07.1-COVID-19;  U07.1-COVID-19; J12.82-Pneumonia due to Coronavirus disease 2019;  R09.02-Hypoxemia     COMPARISON:  9/1/2021     TECHNIQUE:   Single radiographic view of the chest was obtained.     FINDINGS:  There is residual airspace opacity in the peripheral right lung, left  perihilar lung and left base. Overall, this is somewhat improved as  compared to prior. No pneumothorax is evident. Heart size and  mediastinal contour appear within normal limits. Question trace left  pleural effusion.       Impression:      There has been some interval improvement in previously seen multifocal  airspace opacities.     Electronically Signed By-Ambrose Levy MD On:9/7/2021 12:11 PM  This report was finalized on [id number] by  Ambrose Levy MD.    XR Abdomen KUB [[id number]] Collected: 09/02/21 2327     Updated: 09/02/21 2331    Narrative:      DATE OF EXAM:  9/2/2021 11:04 PM     PROCEDURE:  XR ABDOMEN KUB-     INDICATIONS:  RUQ pain; R06.00-Dyspnea, unspecified; U07.1-COVID-19; U07.1-COVID-19;  J12.82-Pneumonia due to Coronavirus disease 2019; R09.02-Hypoxemia     COMPARISON:  Portable chest 09/01/2021     TECHNIQUE:   Single radiographic view of the abdomen was obtained.     FINDINGS:  There are right upper quadrant surgical clips. There is scattered gas  and stool in the colon. There is no gaseous distention of small bowel.  There is no gross soft tissue mass or abnormal calcification. There are  bilateral lower lung opacities greater on left than right, grossly  similar to the prior chest x-ray. There are right upper quadrant  surgical clips.       Impression:      1. Unremarkable bowel gas pattern.      Electronically Signed By-Braenna Sanchez MD On:9/2/2021 11:29 PM  This report was finalized on  [id number] by  Beranna Sanchez MD.    XR Chest 1 View [[id number]] Collected: 09/01/21 0759     Updated: 09/01/21 0802    Narrative:      XR CHEST 1 VW-     Date of Exam: 9/1/2021 5:56 AM     Indication: Shortness of breath; R06.00-Dyspnea, unspecified;  U07.1-COVID-19; U07.1-COVID-19; J12.82-Pneumonia due to Coronavirus  disease 2019; R09.02-Hypoxemia.     Comparison Exams: August 31, 2021     Technique: Single AP chest radiograph     FINDINGS:  Patchy bilateral airspace opacities appear slightly worse on the left.  The heart and mediastinal contours appear stable. There is a small left  pleural effusion. The osseous structures appear intact.       Impression:      1.Patchy bilateral airspace opacities appear slightly worse on the left,  suggesting pneumonia.  2.Small left pleural effusion.     Electronically Signed By-Alex Adame MD On:9/1/2021 7:59 AM  This report was finalized on [id number] by  Alex Adame MD.    XR Chest 1 View [[id number]] Collected: 08/31/21 0748     Updated: 08/31/21 0751    Narrative:      XR CHEST 1 VW-     Date of Exam: 8/31/2021 5:02 AM     Indication: Shortness of breath; R06.00-Dyspnea, unspecified;  U07.1-COVID-19; U07.1-COVID-19; J12.82-Pneumonia due to Coronavirus  disease 2019; R09.02-Hypoxemia.     Comparison Exams: August 29, 2021     Technique: Single AP chest radiograph     FINDINGS:  Patchy bilateral airspace opacities appear slightly worse. The heart and  mediastinal contours appear stable. The osseous structures appear  intact.       Impression:      Patchy bilateral airspace opacities appear slightly worse, suggesting  pneumonia.     Electronically Signed By-Alex Adame MD On:8/31/2021 7:49 AM  This report was finalized on [id number] by  Alex Adame MD.    XR Chest 1 View [[id number]] Collected: 08/29/21 1307     Updated: 08/29/21 1310    Narrative:      DATE OF EXAM:  8/29/2021 12:40 PM     PROCEDURE:  XR CHEST 1 VW-     INDICATIONS:  Hypoxia,  Covid 19 infection, shortness of breath.      COMPARISON:  4/17/2019.     TECHNIQUE:   Single radiographic view of the chest was obtained.     FINDINGS:  There are patchy areas of consolidation and increased interstitial  markings consistent with multifocal pneumonia as seen with the Covid 19  infection. The lungs and pleural spaces are otherwise clear. The heart  size is normal.  There is degenerative spondylosis of the thoracic  spine.       Impression:      Abnormal appearance of the lungs which can be seen with Covid 19  pneumonia.     Electronically Signed By-Hubert Campbell MD On:8/29/2021 1:08 PM  This report was finalized on [id number] by  Hubert Campbell MD.          ECHOCARDIOGRAM:    Results for orders placed during the hospital encounter of 12/28/20    Adult Transthoracic Echo Complete W/ Cont if Necessary Per Protocol    Interpretation Summary  · Left ventricular wall thickness is consistent with moderate concentric hypertrophy.  · Estimated left ventricular EF was in agreement with the calculated left ventricular EF. Left ventricular ejection fraction appears to be 66 - 70%. Left ventricular systolic function is normal.  · Left ventricular diastolic function is consistent with (grade Ia w/high LAP) impaired relaxation.  · Estimated right ventricular systolic pressure from tricuspid regurgitation is mildly elevated (35-45 mmHg).  · Mild pulmonary hypertension is present.        I reviewed the patient's new clinical results.    EKG:      Assessment:       Pneumonia due to COVID-19 virus    Hypoxia    Atrial fibrillation with RVR (CMS/HCC)    Benign hypertension with CKD (chronic kidney disease) stage III (CMS/HCC)    1. Acute hypoxic respiratory failure     2. COVID 19      3. Afib, new onset  - rates now controlled  - on Xarelto for anticoagulation  -Continue oral diltiazem     4. History preserved LVEF with grade 1 DD, chronic 12/2020     5. H/o HTN     6. H/o HLD     7. History of pulmonary emboli/DVT  -  on Xarelto at home       Plan:   Remains in SR. On diltiazem 120mg   On Xarelto for anticoagulation  Will plan to repeat ECHO as outpt, noncritical to repeat presently  Patient to discharge with event monitor with outpt cardiology follow up    Will see as needed and follow peripherally. Please call with questions    Brennen Tobar MD, PhD        Lyndsey Acosta, TOMMY  09/08/21  09:59 EDT    Electronically signed by Brennen Tobar MD at 09/08/21 3854

## 2021-09-10 ENCOUNTER — READMISSION MANAGEMENT (OUTPATIENT)
Dept: CALL CENTER | Facility: HOSPITAL | Age: 56
End: 2021-09-10

## 2021-09-10 ENCOUNTER — APPOINTMENT (OUTPATIENT)
Dept: RESPIRATORY THERAPY | Facility: HOSPITAL | Age: 56
End: 2021-09-10

## 2021-09-10 VITALS
SYSTOLIC BLOOD PRESSURE: 158 MMHG | DIASTOLIC BLOOD PRESSURE: 66 MMHG | WEIGHT: 270.06 LBS | TEMPERATURE: 98 F | RESPIRATION RATE: 18 BRPM | BODY MASS INDEX: 43.4 KG/M2 | HEART RATE: 81 BPM | OXYGEN SATURATION: 94 % | HEIGHT: 66 IN

## 2021-09-10 PROBLEM — D89.832 CYTOKINE RELEASE SYNDROME, GRADE 2: Status: ACTIVE | Noted: 2021-09-10

## 2021-09-10 LAB
ALBUMIN SERPL-MCNC: 3.1 G/DL (ref 3.5–5.2)
ALBUMIN/GLOB SERPL: 1.4 G/DL
ALP SERPL-CCNC: 67 U/L (ref 39–117)
ALT SERPL W P-5'-P-CCNC: 19 U/L (ref 1–33)
ANION GAP SERPL CALCULATED.3IONS-SCNC: 10 MMOL/L (ref 5–15)
AST SERPL-CCNC: 17 U/L (ref 1–32)
BILIRUB CONJ SERPL-MCNC: <0.2 MG/DL (ref 0–0.3)
BILIRUB SERPL-MCNC: 0.2 MG/DL (ref 0–1.2)
BUN SERPL-MCNC: 27 MG/DL (ref 6–20)
BUN/CREAT SERPL: 31.4 (ref 7–25)
CALCIUM SPEC-SCNC: 8.5 MG/DL (ref 8.6–10.5)
CHLORIDE SERPL-SCNC: 98 MMOL/L (ref 98–107)
CO2 SERPL-SCNC: 29 MMOL/L (ref 22–29)
CREAT SERPL-MCNC: 0.86 MG/DL (ref 0.57–1)
CRP SERPL-MCNC: 0.3 MG/DL (ref 0–0.5)
D DIMER PPP FEU-MCNC: 1.19 MG/L (FEU) (ref 0–0.59)
DEPRECATED RDW RBC AUTO: 45.1 FL (ref 37–54)
ERYTHROCYTE [DISTWIDTH] IN BLOOD BY AUTOMATED COUNT: 13.9 % (ref 12.3–15.4)
GFR SERPL CREATININE-BSD FRML MDRD: 68 ML/MIN/1.73
GLOBULIN UR ELPH-MCNC: 2.2 GM/DL
GLUCOSE BLDC GLUCOMTR-MCNC: 116 MG/DL (ref 70–105)
GLUCOSE BLDC GLUCOMTR-MCNC: 129 MG/DL (ref 70–105)
GLUCOSE BLDC GLUCOMTR-MCNC: 71 MG/DL (ref 70–105)
GLUCOSE SERPL-MCNC: 105 MG/DL (ref 65–99)
HCT VFR BLD AUTO: 35.7 % (ref 34–46.6)
HGB BLD-MCNC: 11.8 G/DL (ref 12–15.9)
LYMPHOCYTES # BLD MANUAL: 2.45 10*3/MM3 (ref 0.7–3.1)
LYMPHOCYTES NFR BLD MANUAL: 19 % (ref 19.6–45.3)
LYMPHOCYTES NFR BLD MANUAL: 8 % (ref 5–12)
MCH RBC QN AUTO: 30.6 PG (ref 26.6–33)
MCHC RBC AUTO-ENTMCNC: 33.1 G/DL (ref 31.5–35.7)
MCV RBC AUTO: 92.5 FL (ref 79–97)
METAMYELOCYTES NFR BLD MANUAL: 1 % (ref 0–0)
MONOCYTES # BLD AUTO: 1.03 10*3/MM3 (ref 0.1–0.9)
MYELOCYTES NFR BLD MANUAL: 1 % (ref 0–0)
NEUTROPHILS # BLD AUTO: 9.16 10*3/MM3 (ref 1.7–7)
NEUTROPHILS NFR BLD MANUAL: 70 % (ref 42.7–76)
NEUTS BAND NFR BLD MANUAL: 1 % (ref 0–5)
PLAT MORPH BLD: NORMAL
PLATELET # BLD AUTO: 331 10*3/MM3 (ref 140–450)
PMV BLD AUTO: 7.7 FL (ref 6–12)
POTASSIUM SERPL-SCNC: 5.2 MMOL/L (ref 3.5–5.2)
PROT SERPL-MCNC: 5.3 G/DL (ref 6–8.5)
RBC # BLD AUTO: 3.86 10*6/MM3 (ref 3.77–5.28)
RBC MORPH BLD: NORMAL
SCAN SLIDE: NORMAL
SODIUM SERPL-SCNC: 137 MMOL/L (ref 136–145)
WBC # BLD AUTO: 12.9 10*3/MM3 (ref 3.4–10.8)
WBC MORPH BLD: NORMAL

## 2021-09-10 PROCEDURE — 82962 GLUCOSE BLOOD TEST: CPT

## 2021-09-10 PROCEDURE — 94618 PULMONARY STRESS TESTING: CPT

## 2021-09-10 PROCEDURE — 97116 GAIT TRAINING THERAPY: CPT

## 2021-09-10 PROCEDURE — 85007 BL SMEAR W/DIFF WBC COUNT: CPT | Performed by: INTERNAL MEDICINE

## 2021-09-10 PROCEDURE — 94799 UNLISTED PULMONARY SVC/PX: CPT

## 2021-09-10 PROCEDURE — 86140 C-REACTIVE PROTEIN: CPT | Performed by: INTERNAL MEDICINE

## 2021-09-10 PROCEDURE — 82248 BILIRUBIN DIRECT: CPT | Performed by: INTERNAL MEDICINE

## 2021-09-10 PROCEDURE — 25010000002 DEXAMETHASONE PER 1 MG: Performed by: NURSE PRACTITIONER

## 2021-09-10 PROCEDURE — 25010000002 FUROSEMIDE PER 20 MG: Performed by: NURSE PRACTITIONER

## 2021-09-10 PROCEDURE — 80053 COMPREHEN METABOLIC PANEL: CPT | Performed by: INTERNAL MEDICINE

## 2021-09-10 PROCEDURE — 97530 THERAPEUTIC ACTIVITIES: CPT

## 2021-09-10 PROCEDURE — 85025 COMPLETE CBC W/AUTO DIFF WBC: CPT | Performed by: INTERNAL MEDICINE

## 2021-09-10 PROCEDURE — 85379 FIBRIN DEGRADATION QUANT: CPT | Performed by: INTERNAL MEDICINE

## 2021-09-10 RX ORDER — PSEUDOEPHEDRINE HCL 30 MG
100 TABLET ORAL 2 TIMES DAILY
Qty: 60 CAPSULE | Refills: 0 | Status: SHIPPED | OUTPATIENT
Start: 2021-09-10

## 2021-09-10 RX ORDER — POLYETHYLENE GLYCOL 3350 17 G/17G
17 POWDER, FOR SOLUTION ORAL DAILY
Qty: 238 G | Refills: 0 | Status: SHIPPED | OUTPATIENT
Start: 2021-09-11

## 2021-09-10 RX ORDER — GUAIFENESIN 600 MG/1
600 TABLET, EXTENDED RELEASE ORAL EVERY 12 HOURS SCHEDULED
Qty: 60 TABLET | Refills: 0 | Status: SHIPPED | OUTPATIENT
Start: 2021-09-10

## 2021-09-10 RX ORDER — DEXAMETHASONE 6 MG/1
6 TABLET ORAL DAILY
Status: DISCONTINUED | OUTPATIENT
Start: 2021-09-11 | End: 2021-09-10 | Stop reason: HOSPADM

## 2021-09-10 RX ORDER — ZINC SULFATE 50(220)MG
220 CAPSULE ORAL DAILY
Qty: 30 CAPSULE | Refills: 0 | Status: SHIPPED | OUTPATIENT
Start: 2021-09-11

## 2021-09-10 RX ORDER — MELATONIN
1000 DAILY
Qty: 30 TABLET | Refills: 0 | Status: SHIPPED | OUTPATIENT
Start: 2021-09-11

## 2021-09-10 RX ORDER — DILTIAZEM HYDROCHLORIDE 120 MG/1
120 CAPSULE, COATED, EXTENDED RELEASE ORAL
Qty: 30 CAPSULE | Refills: 1 | Status: SHIPPED | OUTPATIENT
Start: 2021-09-11 | End: 2021-10-25 | Stop reason: DRUGHIGH

## 2021-09-10 RX ORDER — FUROSEMIDE 20 MG/1
20 TABLET ORAL DAILY
Qty: 30 TABLET | Refills: 0 | Status: SHIPPED | OUTPATIENT
Start: 2021-09-10

## 2021-09-10 RX ORDER — ASCORBIC ACID 500 MG
500 TABLET ORAL DAILY
Qty: 30 TABLET | Refills: 0 | Status: SHIPPED | OUTPATIENT
Start: 2021-09-11

## 2021-09-10 RX ORDER — DEXAMETHASONE 1 MG
1 TABLET ORAL 2 TIMES DAILY WITH MEALS
Qty: 40 TABLET | Refills: 0 | Status: SHIPPED | OUTPATIENT
Start: 2021-09-10

## 2021-09-10 RX ORDER — BENZONATATE 200 MG/1
200 CAPSULE ORAL 3 TIMES DAILY PRN
Qty: 20 CAPSULE | Refills: 0 | OUTPATIENT
Start: 2021-09-10 | End: 2022-01-04

## 2021-09-10 RX ADMIN — OXYCODONE HYDROCHLORIDE AND ACETAMINOPHEN 500 MG: 500 TABLET ORAL at 08:32

## 2021-09-10 RX ADMIN — Medication 1000 UNITS: at 08:32

## 2021-09-10 RX ADMIN — Medication 600 MG: at 08:32

## 2021-09-10 RX ADMIN — DOCUSATE SODIUM 100 MG: 100 CAPSULE, LIQUID FILLED ORAL at 08:32

## 2021-09-10 RX ADMIN — LEVOTHYROXINE SODIUM 112 MCG: 0.11 TABLET ORAL at 06:28

## 2021-09-10 RX ADMIN — BUDESONIDE AND FORMOTEROL FUMARATE DIHYDRATE 2 PUFF: 160; 4.5 AEROSOL RESPIRATORY (INHALATION) at 19:33

## 2021-09-10 RX ADMIN — FUROSEMIDE 20 MG: 10 INJECTION INTRAMUSCULAR; INTRAVENOUS at 08:32

## 2021-09-10 RX ADMIN — DILTIAZEM HYDROCHLORIDE 120 MG: 120 CAPSULE, COATED, EXTENDED RELEASE ORAL at 08:32

## 2021-09-10 RX ADMIN — Medication 220 MG: at 08:32

## 2021-09-10 RX ADMIN — ALBUTEROL SULFATE 2 PUFF: 90 AEROSOL, METERED RESPIRATORY (INHALATION) at 12:15

## 2021-09-10 RX ADMIN — PANTOPRAZOLE SODIUM 40 MG: 40 TABLET, DELAYED RELEASE ORAL at 06:28

## 2021-09-10 RX ADMIN — GUAIFENESIN 600 MG: 600 TABLET, EXTENDED RELEASE ORAL at 08:32

## 2021-09-10 RX ADMIN — Medication 3 ML: at 08:33

## 2021-09-10 RX ADMIN — ALBUTEROL SULFATE 2 PUFF: 90 AEROSOL, METERED RESPIRATORY (INHALATION) at 03:19

## 2021-09-10 RX ADMIN — FLUOXETINE 40 MG: 20 CAPSULE ORAL at 08:32

## 2021-09-10 RX ADMIN — ALBUTEROL SULFATE 2 PUFF: 90 AEROSOL, METERED RESPIRATORY (INHALATION) at 08:23

## 2021-09-10 RX ADMIN — ALBUTEROL SULFATE 2 PUFF: 90 AEROSOL, METERED RESPIRATORY (INHALATION) at 19:33

## 2021-09-10 RX ADMIN — BUDESONIDE AND FORMOTEROL FUMARATE DIHYDRATE 2 PUFF: 160; 4.5 AEROSOL RESPIRATORY (INHALATION) at 08:23

## 2021-09-10 RX ADMIN — DEXAMETHASONE SODIUM PHOSPHATE 6 MG: 4 INJECTION, SOLUTION INTRAMUSCULAR; INTRAVENOUS at 08:32

## 2021-09-10 NOTE — THERAPY TREATMENT NOTE
Subjective: Pt agreeable to therapeutic plan of care.    Objective:   HARRINGTON, resting sats at 94%, dropped to 84% c amb.on RA.  Bed mobility - N/A or Not attempted. pt. Up in chair.  Transfers - CGA and with rolling walker, vcs for safe hand placement c rwx. use.  Ambulation - 35 feet CGA and with rolling walker, slowed radha, ff posture,  righting recovery, unable to accept challenge away from midline. Required multiple standing rests due to HARRINGTON to complete distance.  Instructed/reinforced PLB/posture to improve respiration.    Pain: 0 VAS    Education: Provided education on importance of mobility and skilled verbal / tactile cueing throughout intervention.     Assessment: Danita Montiel presents with functional mobility impairments which indicate the need for skilled intervention. Requiring cga c rwx. for toileting hygiene.Tolerating session today without incident. Will continue to follow and progress as tolerated.     Plan/Recommendations:   Pt would benefit from Home with family assist at discharge from facility and requires rolling walker at discharge.   Pt desires Home with family assist at discharge. Pt cooperative; agreeable to therapeutic recommendations and plan of care. Pt.is refusing HH,  reports he will be home with her.    Basic Mobility 6-click:  Rollin = Total, A lot = 2, A little = 3; 4 = None  Supine>Sit:   1 = Total, A lot = 2, A little = 3; 4 = None   Sit>Stand with arms:  1 = Total, A lot = 2, A little = 3; 4 = None  Bed>Chair:   1 = Total, A lot = 2, A little = 3; 4 = None  Ambulate in room:  1 = Total, A lot = 2, A little = 3; 4 = None  3-5 Steps with railin = Total, A lot = 2, A little = 3; 4 = None  Score: 17    Modified Brandie: 4 = Moderately severe disability (Unable to attend to own bodily needs without assistance, and unable to walk unassisted)     Post-Tx Position: Up in Chair and Call light and personal items within reach  PPE: gloves, surgical mask,  eyewear protection

## 2021-09-10 NOTE — PLAN OF CARE
Goal Outcome Evaluation:      On room air; did not qualify for o2 per rt.  Continue to monitor.

## 2021-09-10 NOTE — NURSING NOTE
Pt was working with physical therapy and her oxygen saturations dropped to upper 70's/ low 80%.  She sustained around 83-84%.  Intermittentt 2l NC brought her above 90%.  While sitting and talking on room air, she was 88%.  Pt reported shortness of breath.    Rn notified VERONIKA Lopez, who felt that it would be best to send her home with o2.  RN notified case management.

## 2021-09-10 NOTE — DISCHARGE SUMMARY
Date of Discharge:  9/10/2021    Discharge Diagnosis:   Covid 19 pneumonia- Finished remdesivir, rocephin, doxy - home with bronchodilators, vitamins- o2 prn     Hypoxia- 02 with activity     Afib with RVR- Converted to sinus on cardizem- home on po diltiazem         Presenting Problem/History of Present Illness  Active Hospital Problems    Diagnosis  POA   • **Pneumonia due to COVID-19 virus [U07.1, J12.82]  Yes   • Cytokine release syndrome, grade 2 [D89.832]  Yes   • Atrial fibrillation with RVR (CMS/HCC) [I48.91]  No   • Benign hypertension with CKD (chronic kidney disease) stage III (CMS/HCC) [I12.9, N18.30]  Yes   • Hypoxia [R09.02]  Yes      Resolved Hospital Problems   No resolved problems to display.          Hospital Course  Patient is a 56 y.o. female presented with soa , cough and vomiting. Er found pt with hypoxia that required 6 L of o2. She has underlying asthma and hx PE. She had remdesivir, steroids, bronchodilators and was followed by pulmonology. Oxygen demands increased and temperature increased. Antibiotics were added in. Pt is with known alcohol abuse history and was monitored for signs of withdraw. She required High flow o2 and at one time had an abg with po2 of 50 .Lasix was added in  And steroids where increased. She went into afib with RVR. Cardiology was consulted and cardizem drip was started. She was maintained on home xarelto for anticoagulation. She spontaneously converted to SR and was maintained on po diltiazem. She began to improve over time and oxygen was weaned down. Today she is at 2l nasal o2 with sats in 90s. She did a 6 min walk and did not desat on RA yet with PT her sats dropped to 83 on RA. She is deemed stable for home today . She will go home with o2 to use as needed with activity . She will have an event monitor placed and Fu with cardio for the Afib.     Procedures Performed         Consults:   Consults     Date and Time Order Name Status Description    9/3/2021  8:21  AM Inpatient Cardiology Consult      8/31/2021  5:17 AM Inpatient Cardiology Consult Completed     8/30/2021 12:47 PM Inpatient Pulmonology Consult Completed     8/29/2021  5:20 PM Inpatient Pulmonology Consult      8/29/2021  1:16 PM Family Medicine Consult Completed           Pertinent Test Results:    Lab Results (most recent)     Procedure Component Value Units Date/Time    POC Glucose Once [[id number]]  (Abnormal) Collected: 09/10/21 1641    Specimen: Blood Updated: 09/10/21 1642     Glucose 129 mg/dL      Comment: Serial Number: [id number]:  [id number]       POC Glucose Once [[id number]]  (Abnormal) Collected: 09/10/21 1235    Specimen: Blood Updated: 09/10/21 1236     Glucose 116 mg/dL      Comment: Serial Number: [id number]:  [id number]       Manual Differential [[id number]]  (Abnormal) Collected: 09/10/21 0230    Specimen: Blood Updated: 09/10/21 0342     Neutrophil % 70.0 %      Lymphocyte % 19.0 %      Monocyte % 8.0 %      Bands %  1.0 %      Metamyelocyte % 1.0 %      Myelocyte % 1.0 %      Neutrophils Absolute 9.16 10*3/mm3      Lymphocytes Absolute 2.45 10*3/mm3      Monocytes Absolute 1.03 10*3/mm3      RBC Morphology Normal     WBC Morphology Normal     Platelet Morphology Normal    CBC & Differential [[id number]]  (Abnormal) Collected: 09/10/21 0230    Specimen: Blood Updated: 09/10/21 0342    Narrative:      The following orders were created for panel order CBC & Differential.  Procedure                               Abnormality         Status                     ---------                               -----------         ------                     CBC Auto Differential[[id number]]        Abnormal            Final result               Scan Slide[[id number]]                                       Final result                 Please view results for these tests on the individual orders.    Scan Slide [[id number]] Collected: 09/10/21 0230    Specimen: Blood Updated: 09/10/21 0342     Scan Slide  --     Comment: See Manual Differential Results       CBC Auto Differential [[id number]]  (Abnormal) Collected: 09/10/21 0230    Specimen: Blood Updated: 09/10/21 0342     WBC 12.90 10*3/mm3      RBC 3.86 10*6/mm3      Hemoglobin 11.8 g/dL      Hematocrit 35.7 %      MCV 92.5 fL      MCH 30.6 pg      MCHC 33.1 g/dL      RDW 13.9 %      RDW-SD 45.1 fl      MPV 7.7 fL      Platelets 331 10*3/mm3     Narrative:      The previously reported component NRBC is no longer being reported. Previous result was 0.0 /100 WBC (Reference Range: 0.0-0.2 /100 WBC) on 9/10/2021 at 0257 EDT.    C-reactive Protein [[id number]]  (Normal) Collected: 09/10/21 0230    Specimen: Blood Updated: 09/10/21 0341     C-Reactive Protein 0.30 mg/dL     Comprehensive Metabolic Panel [[id number]]  (Abnormal) Collected: 09/10/21 0230    Specimen: Blood Updated: 09/10/21 0321     Glucose 105 mg/dL      BUN 27 mg/dL      Creatinine 0.86 mg/dL      Sodium 137 mmol/L      Potassium 5.2 mmol/L      Comment: Slight hemolysis detected by analyzer. Results may be affected.        Chloride 98 mmol/L      CO2 29.0 mmol/L      Calcium 8.5 mg/dL      Total Protein 5.3 g/dL      Albumin 3.10 g/dL      ALT (SGPT) 19 U/L      AST (SGOT) 17 U/L      Comment: Slight hemolysis detected by analyzer. Results may be affected.        Alkaline Phosphatase 67 U/L      Total Bilirubin 0.2 mg/dL      eGFR Non African Amer 68 mL/min/1.73      Globulin 2.2 gm/dL      A/G Ratio 1.4 g/dL      BUN/Creatinine Ratio 31.4     Anion Gap 10.0 mmol/L     Narrative:      GFR Normal >60  Chronic Kidney Disease <60  Kidney Failure <15      Bilirubin, Direct [[id number]]  (Normal) Collected: 09/10/21 0230    Specimen: Blood Updated: 09/10/21 0321     Bilirubin, Direct <0.2 mg/dL      Comment: Specimen hemolyzed. Results may be affected.       D-dimer, Quantitative [[id number]]  (Abnormal) Collected: 09/10/21 0230    Specimen: Blood Updated: 09/10/21 0303     D-Dimer, Quantitative 1.19 mg/L (FEU)      Narrative:      Reference Range  --------------------------------------------------------------------     < 0.50   Negative Predictive Value  0.50-0.59   Indeterminate    >= 0.60   Probable VTE             A very low percentage of patients with DVT may yield D-Dimer results   below the cut-off of 0.50 mg/L FEU.  This is known to be more   prevalent in patients with distal DVT.             Results of this test should always be interpreted in conjunction with   the patient's medical history, clinical presentation and other   findings.  Clinical diagnosis should not be based on the result of   [place] D-Dimer alone.    Comprehensive Metabolic Panel [[id number]]  (Abnormal) Collected: 09/09/21 0642    Specimen: Blood Updated: 09/09/21 0715     Glucose 87 mg/dL      BUN 31 mg/dL      Creatinine 0.88 mg/dL      Sodium 134 mmol/L      Potassium 4.4 mmol/L      Comment: Slight hemolysis detected by analyzer. Results may be affected.        Chloride 95 mmol/L      CO2 32.0 mmol/L      Calcium 8.5 mg/dL      Total Protein 5.7 g/dL      Albumin 3.00 g/dL      ALT (SGPT) 18 U/L      AST (SGOT) 23 U/L      Alkaline Phosphatase 74 U/L      Total Bilirubin 0.2 mg/dL      eGFR Non African Amer 66 mL/min/1.73      Globulin 2.7 gm/dL      A/G Ratio 1.1 g/dL      BUN/Creatinine Ratio 35.2     Anion Gap 7.0 mmol/L     Narrative:      GFR Normal >60  Chronic Kidney Disease <60  Kidney Failure <15      Manual Differential [[id number]]  (Abnormal) Collected: 09/09/21 0237    Specimen: Blood Updated: 09/09/21 0347     Neutrophil % 70.0 %      Lymphocyte % 20.0 %      Monocyte % 8.0 %      Eosinophil % 1.0 %      Myelocyte % 1.0 %      Neutrophils Absolute 9.52 10*3/mm3      Lymphocytes Absolute 2.72 10*3/mm3      Monocytes Absolute 1.09 10*3/mm3      Eosinophils Absolute 0.14 10*3/mm3      RBC Morphology Normal     WBC Morphology Normal     Platelet Morphology Normal    CBC & Differential [[id number]]  (Abnormal) Collected: 09/09/21 0232     Specimen: Blood Updated: 09/09/21 0347    Narrative:      The following orders were created for panel order CBC & Differential.  Procedure                               Abnormality         Status                     ---------                               -----------         ------                     CBC Auto Differential[[id number]]        Abnormal            Final result               Scan Slide[[id number]]                                       Final result                 Please view results for these tests on the individual orders.    Scan Slide [[id number]] Collected: 09/09/21 0237    Specimen: Blood Updated: 09/09/21 0347     Scan Slide --     Comment: See Manual Differential Results       CBC Auto Differential [[id number]]  (Abnormal) Collected: 09/09/21 0237    Specimen: Blood Updated: 09/09/21 0347     WBC 13.60 10*3/mm3      RBC 3.62 10*6/mm3      Hemoglobin 11.5 g/dL      Hematocrit 33.9 %      MCV 93.5 fL      MCH 31.8 pg      MCHC 34.0 g/dL      RDW 13.4 %      RDW-SD 43.3 fl      MPV 8.1 fL      Platelets 394 10*3/mm3     Narrative:      The previously reported component NRBC is no longer being reported. Previous result was 0.0 /100 WBC (Reference Range: 0.0-0.2 /100 WBC) on 9/9/2021 at 0308 EDT.    C-reactive Protein [[id number]]  (Normal) Collected: 09/09/21 0237    Specimen: Blood Updated: 09/09/21 0344     C-Reactive Protein <0.30 mg/dL     Bilirubin, Direct [[id number]]  (Normal) Collected: 09/09/21 0237    Specimen: Blood Updated: 09/09/21 0330     Bilirubin, Direct <0.2 mg/dL     D-dimer, Quantitative [[id number]]  (Abnormal) Collected: 09/09/21 0237    Specimen: Blood Updated: 09/09/21 0310     D-Dimer, Quantitative 1.51 mg/L (FEU)     Narrative:      Reference Range  --------------------------------------------------------------------     < 0.50   Negative Predictive Value  0.50-0.59   Indeterminate    >= 0.60   Probable VTE             A very low percentage of patients with DVT may yield D-Dimer  results   below the cut-off of 0.50 mg/L FEU.  This is known to be more   prevalent in patients with distal DVT.             Results of this test should always be interpreted in conjunction with   the patient's medical history, clinical presentation and other   findings.  Clinical diagnosis should not be based on the result of   [facility] D-Dimer alone.    Magnesium [[id number]]  (Normal) Collected: 09/07/21 0331    Specimen: Blood Updated: 09/07/21 0441     Magnesium 2.2 mg/dL     Blood Culture - Blood, Hand, Left [[id number]] Collected: 08/30/21 1549    Specimen: Blood from Hand, Left Updated: 09/04/21 1632     Blood Culture No growth at 5 days    Blood Culture - Blood, Arm, Right [[id number]] Collected: 08/30/21 1549    Specimen: Blood from Arm, Right Updated: 09/04/21 1632     Blood Culture No growth at 5 days    Calcium, Ionized [[id number]]  (Normal) Collected: 09/03/21 0839    Specimen: Blood Updated: 09/03/21 0852     Ionized Calcium 1.21 mmol/L     Blood Gas, Arterial - [[id number]]  (Abnormal) Collected: 09/01/21 0855    Specimen: Arterial Blood Updated: 09/01/21 0858     Site Right Radial     Jose's Test Positive     pH, Arterial 7.421 pH units      pCO2, Arterial 31.9 mm Hg      pO2, Arterial 50.4 mm Hg      HCO3, Arterial 20.7 mmol/L      Base Excess, Arterial -2.7 mmol/L      Comment: Serial Number: [id number]:  [id number]        O2 Saturation, Arterial 86.5 %      CO2 Content 21.7 mmol/L      Barometric Pressure for Blood Gas --     Comment: N/A        Modality NRB     FIO2 100 %      Hemodilution No    TSH [[id number]]  (Normal) Collected: 08/30/21 0839    Specimen: Blood Updated: 08/30/21 1320     TSH 0.362 uIU/mL     Creatinine, Serum [[id number]]  (Abnormal) Collected: 08/29/21 1215    Specimen: Blood Updated: 08/29/21 1955     Creatinine 1.32 mg/dL      eGFR Non African Amer 41 mL/min/1.73     Narrative:      GFR Normal >60  Chronic Kidney Disease <60  Kidney Failure <15      Hepatic Function Panel  [[id number]]  (Abnormal) Collected: 08/29/21 1215    Specimen: Blood Updated: 08/29/21 1955     Total Protein 7.4 g/dL      Albumin 4.00 g/dL      ALT (SGPT) 27 U/L      AST (SGOT) 43 U/L      Alkaline Phosphatase 80 U/L      Total Bilirubin 0.4 mg/dL      Bilirubin, Direct <0.2 mg/dL      Bilirubin, Indirect --     Comment: Unable to calculate       COVID PRE-OP / PRE-PROCEDURE SCREENING ORDER (NO ISOLATION) - Swab, Nasopharynx [[id number]]  (Abnormal) Collected: 08/29/21 1550    Specimen: Swab from Nasopharynx Updated: 08/29/21 1644    Narrative:      The following orders were created for panel order COVID PRE-OP / PRE-PROCEDURE SCREENING ORDER (NO ISOLATION) - Swab, Nasopharynx.  Procedure                               Abnormality         Status                     ---------                               -----------         ------                     COVID-19,CEPHEID/GHAZAL/BD...[[id number]]  Abnormal            Final result                 Please view results for these tests on the individual orders.    COVID-19,CEPHEID/GHAZAL/BDMAX,COR/[facility]/PAD/[facility] IN-HOUSE(OR EMERGENT/ADD-ON),NP SWAB IN TRANSPORT MEDIA 3-4 HR TAT, RT-PCR - Swab, Nasopharynx [[id number]]  (Abnormal) Collected: 08/29/21 1550    Specimen: Swab from Nasopharynx Updated: 08/29/21 1644     COVID19 Detected    Narrative:      Fact sheet for providers: https://www.fda.gov/media/538559/download     Fact sheet for patients: https://www.fda.gov/media/010401/download  Fact sheet for providers: https://www.fda.gov/media/702302/download    Fact sheet for patients: https://www.fda.gov/media/405806/download    Test performed by PCR.    [place] Draw [[id number]] Collected: 08/29/21 1215    Specimen: Blood Updated: 08/29/21 1330    Narrative:      The following orders were created for panel order [place] Draw.  Procedure                               Abnormality         Status                     ---------                               -----------         ------                      Green Top (Gel)[[id number]]                                  Final result               Lavender Top[[id number]]                                     Final result               Gold Top - SST[[id number]]                                   Final result               Light Blue Top[[id number]]                                   Final result                 Please view results for these tests on the individual orders.    Light Blue Top [[id number]] Collected: 08/29/21 1215    Specimen: Blood Updated: 08/29/21 1330     Extra Tube hold for add-on     Comment: Auto resulted       Lavender Top [[id number]] Collected: 08/29/21 1215    Specimen: Blood Updated: 08/29/21 1252    Green Top (Gel) [[id number]] Collected: 08/29/21 1215    Specimen: Blood Updated: 08/29/21 1242    Gold Top - SST [[id number]] Collected: 08/29/21 1227    Specimen: Blood Updated: 08/29/21 1241    Blood Gas, Arterial - [[id number]]  (Abnormal) Collected: 08/29/21 1236    Specimen: Arterial Blood Updated: 08/29/21 1238     Site Right Brachial     Jose's Test Positive     pH, Arterial 7.449 pH units      pCO2, Arterial 30.0 mm Hg      pO2, Arterial 65.7 mm Hg      HCO3, Arterial 20.8 mmol/L      Base Excess, Arterial -2.1 mmol/L      Comment: Serial Number: [id number]:  [id number]        O2 Saturation, Arterial 93.9 %      CO2 Content 21.7 mmol/L      Barometric Pressure for Blood Gas --     Comment: N/A        Modality Cannula     FIO2 32 %      Hemodilution No    POC Lactate [[id number]]  (Normal) Collected: 08/29/21 1220    Specimen: Blood Updated: 08/29/21 1221     Lactate 1.1 mmol/L      Comment: Serial Number: [id number]:  [id number]              Results for orders placed during the hospital encounter of 12/28/20    Adult Transthoracic Echo Complete W/ Cont if Necessary Per Protocol    Interpretation Summary  · Left ventricular wall thickness is consistent with moderate concentric hypertrophy.  · Estimated left ventricular EF was in agreement  with the calculated left ventricular EF. Left ventricular ejection fraction appears to be 66 - 70%. Left ventricular systolic function is normal.  · Left ventricular diastolic function is consistent with (grade Ia w/high LAP) impaired relaxation.  · Estimated right ventricular systolic pressure from tricuspid regurgitation is mildly elevated (35-45 mmHg).  · Mild pulmonary hypertension is present.         Condition on Discharge: stable    Vital Signs  Temp:  [96.4 °F (35.8 °C)-98 °F (36.7 °C)] 98 °F (36.7 °C)  Heart Rate:  [] 80  Resp:  [18-22] 20  BP: (123-158)/(48-74) 158/66    Physical Exam:     General Appearance:    Alert, cooperative, in no acute distress   Head:    Normocephalic, without obvious abnormality, atraumatic   Eyes:            Lids and lashes normal, conjunctivae and sclerae normal, no   icterus, no pallor, corneas clear, PERRLA   Ears:    Ears appear intact with no abnormalities noted   Throat:   No oral lesions, no thrush, oral mucosa moist   Neck:   No adenopathy, supple, trachea midline, no thyromegaly, no   carotid bruit, no JVD   Lungs:     Rare wheeze- otherwise clear respirations regular, even and                  unlabored    Heart:    Regular rhythm and normal rate, normal S1 and S2, no            murmur, no gallop, no rub, no click   Chest Wall:    No abnormalities observed   Abdomen:     Normal bowel sounds, no masses, no organomegaly, soft        non-tender, non-distended, no guarding, no rebound                tenderness   Extremities:   Moves all extremities well, no edema, no cyanosis, no             redness   Pulses:   Pulses palpable and equal bilaterally   Skin:   No bleeding, bruising or rash   Lymph nodes:   No palpable adenopathy   Neurologic:   Cranial nerves 2 - 12 grossly intact, sensation intact, DTR       present and equal bilaterally       Discharge Disposition  Home or Self Care    Discharge Medications     Discharge Medications      New Medications       Instructions Start Date   Acetylcysteine capsule capsule   600 mg, Oral, 2 Times Daily      ascorbic acid 500 MG tablet  Commonly known as: VITAMIN C   500 mg, Oral, Daily   Start Date: September 11, 2021     benzonatate 200 MG capsule  Commonly known as: TESSALON   200 mg, Oral, 3 Times Daily PRN      cholecalciferol 25 MCG (1000 UT) tablet  Commonly known as: VITAMIN D3   1,000 Units, Oral, Daily   Start Date: September 11, 2021     dexamethasone 1 MG tablet  Commonly known as: DECADRON   1 mg, Oral, 2 Times Daily With Meals, 4 po qd for 4 d then 3 po qd for 4 d then 2 po qd for 4 d then 1 po qd for 4 d then stop      dilTIAZem  MG 24 hr capsule  Commonly known as: CARDIZEM CD   120 mg, Oral, Every 24 Hours Scheduled   Start Date: September 11, 2021     docusate sodium 100 MG capsule   100 mg, Oral, 2 Times Daily      furosemide 20 MG tablet  Commonly known as: Lasix   20 mg, Oral, Daily      guaiFENesin 600 MG 12 hr tablet  Commonly known as: MUCINEX   600 mg, Oral, Every 12 Hours Scheduled      polyethylene glycol 17 g packet  Commonly known as: MIRALAX   17 g, Oral, Daily   Start Date: September 11, 2021     zinc sulfate 220 (50 Zn) MG capsule  Commonly known as: ZINCATE   220 mg, Oral, Daily   Start Date: September 11, 2021        Changes to Medications      Instructions Start Date   rivaroxaban 20 MG tablet  Commonly known as: XARELTO  What changed:   · how much to take  · when to take this  · Another medication with the same name was removed. Continue taking this medication, and follow the directions you see here.   20 mg, Oral, Daily With Dinner         Continue These Medications      Instructions Start Date   albuterol sulfate  (90 Base) MCG/ACT inhaler  Commonly known as: PROVENTIL HFA;VENTOLIN HFA;PROAIR HFA   2 puffs, Inhalation, Every 4 Hours PRN      FLUoxetine 40 MG capsule  Commonly known as: PROzac   40 mg, Oral, Daily      fluticasone-salmeterol 250-50 MCG/DOSE DISKUS  Commonly known  as: ADVAIR   1 puff, Inhalation, 2 times daily      levothyroxine 112 MCG tablet  Commonly known as: SYNTHROID, LEVOTHROID   112 mcg, Oral, Daily      montelukast 10 MG tablet  Commonly known as: SINGULAIR   10 mg, Oral, Nightly      omeprazole 40 MG capsule  Commonly known as: priLOSEC   40 mg, Oral, Daily      ondansetron 8 MG tablet  Commonly known as: ZOFRAN   8 mg, Oral, 4 Times Daily PRN      traZODone 50 MG tablet  Commonly known as: DESYREL   50 mg, Oral, Nightly         Stop These Medications    Black Cohosh 160 MG capsule     Exforge HCT -25 MG tablet  Generic drug: amLODIPine-Valsartan-HCTZ     fexofenadine 180 MG tablet  Commonly known as: ALLEGRA     metoprolol tartrate 50 MG tablet  Commonly known as: LOPRESSOR     Turmeric 500 MG capsule            Discharge Diet:     Activity at Discharge:     Follow-up Appointments  No future appointments.      Test Results Pending at Discharge       TOMMY Barrett  09/10/21  16:51 EDT    Time: Discharge 25 min

## 2021-09-10 NOTE — PROGRESS NOTES
LOS: 11 days   Patient Care Team:  Jaya Harper MD as PCP - General  GriefJaya MD as PCP - Family Medicine    Subjective     Interval History:    Patient Complaints: Feeling better every day - less cough, less SOA, better exercise tolerance.    History taken from: patient    Review of Systems   Constitutional: Positive for activity change and fatigue. Negative for appetite change, chills, diaphoresis and fever.   HENT: Negative for facial swelling.    Eyes: Negative for visual disturbance.   Respiratory: Positive for cough and shortness of breath. Negative for wheezing and stridor.    Cardiovascular: Negative for chest pain, palpitations and leg swelling.   Gastrointestinal: Negative for constipation, diarrhea, nausea and vomiting.   Genitourinary: Negative for hematuria.   Musculoskeletal: Negative for back pain.   Neurological: Negative for weakness.   Psychiatric/Behavioral: Negative for confusion.           Objective     Vital Signs  Temp:  [97 °F (36.1 °C)-98.7 °F (37.1 °C)] 97.1 °F (36.2 °C)  Heart Rate:  [] 82  Resp:  [18-24] 20  BP: (126-148)/() 133/48    Physical Exam:     General Appearance:    Alert, cooperative, in no acute distress,   Head:    Normocephalic, without obvious abnormality, atraumatic   Eyes:            Lids and lashes normal, conjunctivae and sclerae normal, no   icterus, no pallor, corneas clear, PERRLA   Ears:    Ears appear intact with no abnormalities noted   Throat:   No oral lesions, no thrush, oral mucosa moist   Neck:   No adenopathy, supple, trachea midline, no thyromegaly, no   carotid bruit, no JVD   Lungs:     Few scattered wheezes but overall clear    Heart:    Regular rhythm and normal rate, normal S1 and S2, no            murmur, no gallop, no rub, no click   Chest Wall:    No abnormalities observed   Abdomen:     Normal bowel sounds, no masses, no organomegaly, soft        Non-tender non-distended, no guarding,   Extremities:   Moves all  extremities well, no edema, no cyanosis, no             Redness   Pulses:   Pulses palpable and equal bilaterally   Skin:   No bleeding, bruising or rash   Lymph nodes:   No palpable adenopathy   Neurologic:   Cranial nerves 2 - 12 grossly intact, sensation intact, DTR       present and equal bilaterally        Results Review:    Lab Results (last 24 hours)     Procedure Component Value Units Date/Time    POC Glucose Once [[id number]]  (Abnormal) Collected: 09/09/21 2017    Specimen: Blood Updated: 09/09/21 2019     Glucose 138 mg/dL      Comment: Serial Number: [id number]:  [id number]       POC Glucose Once [[id number]]  (Abnormal) Collected: 09/09/21 1603    Specimen: Blood Updated: 09/09/21 1604     Glucose 110 mg/dL      Comment: Serial Number: [id number]:  [id number]       POC Glucose Once [[id number]]  (Abnormal) Collected: 09/09/21 1054    Specimen: Blood Updated: 09/09/21 1055     Glucose 120 mg/dL      Comment: Serial Number: [id number]:  [id number]       POC Glucose Once [[id number]]  (Normal) Collected: 09/08/21 0726    Specimen: Blood Updated: 09/09/21 0822     Glucose 105 mg/dL      Comment: Serial Number: [id number]:  [id number]       Comprehensive Metabolic Panel [[id number]]  (Abnormal) Collected: 09/09/21 0642    Specimen: Blood Updated: 09/09/21 0715     Glucose 87 mg/dL      BUN 31 mg/dL      Creatinine 0.88 mg/dL      Sodium 134 mmol/L      Potassium 4.4 mmol/L      Comment: Slight hemolysis detected by analyzer. Results may be affected.        Chloride 95 mmol/L      CO2 32.0 mmol/L      Calcium 8.5 mg/dL      Total Protein 5.7 g/dL      Albumin 3.00 g/dL      ALT (SGPT) 18 U/L      AST (SGOT) 23 U/L      Alkaline Phosphatase 74 U/L      Total Bilirubin 0.2 mg/dL      eGFR Non African Amer 66 mL/min/1.73      Globulin 2.7 gm/dL      A/G Ratio 1.1 g/dL      BUN/Creatinine Ratio 35.2     Anion Gap 7.0 mmol/L     Narrative:      GFR Normal >60  Chronic Kidney Disease <60  Kidney  Failure <15      POC Glucose Once [[id number]]  (Normal) Collected: 09/09/21 0658    Specimen: Blood Updated: 09/09/21 0708     Glucose 84 mg/dL      Comment: Serial Number: [id number]:  [id number]       Manual Differential [[id number]]  (Abnormal) Collected: 09/09/21 0237    Specimen: Blood Updated: 09/09/21 0347     Neutrophil % 70.0 %      Lymphocyte % 20.0 %      Monocyte % 8.0 %      Eosinophil % 1.0 %      Myelocyte % 1.0 %      Neutrophils Absolute 9.52 10*3/mm3      Lymphocytes Absolute 2.72 10*3/mm3      Monocytes Absolute 1.09 10*3/mm3      Eosinophils Absolute 0.14 10*3/mm3      RBC Morphology Normal     WBC Morphology Normal     Platelet Morphology Normal    CBC & Differential [[id number]]  (Abnormal) Collected: 09/09/21 0237    Specimen: Blood Updated: 09/09/21 0347    Narrative:      The following orders were created for panel order CBC & Differential.  Procedure                               Abnormality         Status                     ---------                               -----------         ------                     CBC Auto Differential[[id number]]        Abnormal            Final result               Scan Slide[[id number]]                                       Final result                 Please view results for these tests on the individual orders.    Scan Slide [[id number]] Collected: 09/09/21 0237    Specimen: Blood Updated: 09/09/21 0347     Scan Slide --     Comment: See Manual Differential Results       CBC Auto Differential [[id number]]  (Abnormal) Collected: 09/09/21 0237    Specimen: Blood Updated: 09/09/21 0347     WBC 13.60 10*3/mm3      RBC 3.62 10*6/mm3      Hemoglobin 11.5 g/dL      Hematocrit 33.9 %      MCV 93.5 fL      MCH 31.8 pg      MCHC 34.0 g/dL      RDW 13.4 %      RDW-SD 43.3 fl      MPV 8.1 fL      Platelets 394 10*3/mm3     Narrative:      The previously reported component NRBC is no longer being reported. Previous result was 0.0 /100 WBC (Reference Range: 0.0-0.2  /100 WBC) on 9/9/2021 at 0308 EDT.    C-reactive Protein [[id number]]  (Normal) Collected: 09/09/21 0237    Specimen: Blood Updated: 09/09/21 0344     C-Reactive Protein <0.30 mg/dL     Bilirubin, Direct [[id number]]  (Normal) Collected: 09/09/21 0237    Specimen: Blood Updated: 09/09/21 0330     Bilirubin, Direct <0.2 mg/dL     D-dimer, Quantitative [[id number]]  (Abnormal) Collected: 09/09/21 0237    Specimen: Blood Updated: 09/09/21 0310     D-Dimer, Quantitative 1.51 mg/L (FEU)     Narrative:      Reference Range  --------------------------------------------------------------------     < 0.50   Negative Predictive Value  0.50-0.59   Indeterminate    >= 0.60   Probable VTE             A very low percentage of patients with DVT may yield D-Dimer results   below the cut-off of 0.50 mg/L FEU.  This is known to be more   prevalent in patients with distal DVT.             Results of this test should always be interpreted in conjunction with   the patient's medical history, clinical presentation and other   findings.  Clinical diagnosis should not be based on the result of   INNOVANCE D-Dimer alone.           Imaging Results (Last 24 Hours)     ** No results found for the last 24 hours. **               I reviewed the patient's new clinical results.    Medication Review:   Scheduled Meds:Acetylcysteine, 600 mg, Oral, BID  albuterol sulfate HFA, 2 puff, Inhalation, Q4H - RT  ascorbic acid, 500 mg, Oral, Daily  budesonide-formoterol, 2 puff, Inhalation, BID - RT  cefTRIAXone, 1 g, Intravenous, Q24H  cholecalciferol, 1,000 Units, Oral, Daily  dexamethasone, 6 mg, Intravenous, Daily  dilTIAZem CD, 120 mg, Oral, Q24H  docusate sodium, 100 mg, Oral, BID  FLUoxetine, 40 mg, Oral, Daily  furosemide, 20 mg, Intravenous, Q12H  guaiFENesin, 600 mg, Oral, Q12H  insulin lispro, 0-9 Units, Subcutaneous, TID AC  levothyroxine, 112 mcg, Oral, Q AM  montelukast, 10 mg, Oral, Nightly  pantoprazole, 40 mg, Oral, QAM  polyethylene glycol, 17  g, Oral, Daily  rivaroxaban, 20 mg, Oral, Daily With Dinner  sodium chloride, 3 mL, Intravenous, Q12H  traZODone, 50 mg, Oral, Nightly  zinc sulfate, 220 mg, Oral, Daily      Continuous Infusions:   PRN Meds:.benzonatate  •  dextrose  •  dextrose  •  glucagon (human recombinant)  •  HYDROcod Polst-CPM Polst ER  •  insulin lispro **AND** insulin lispro  •  LORazepam  •  melatonin  •  metoprolol tartrate  •  Morphine  •  ondansetron  •  sodium chloride  •  [COMPLETED] Insert peripheral IV **AND** sodium chloride  •  sodium chloride  •  zolpidem     Assessment/Plan       Pneumonia due to COVID-19 virus    Hypoxia    Atrial fibrillation with RVR (CMS/HCC)    Benign hypertension with CKD (chronic kidney disease) stage III (CMS/HCC)  Obesity  H/o DVT  Hypothyroidism    Pt continues to improve.  Continue to wean oxygen.  Home soon with home health if Fi02 decreased to 4 lpm.  Plan repeat echo and event monitor as outpatient.          Plan for disposition:Home soon    Korin Lopez MD  09/09/21  21:10 EDT

## 2021-09-10 NOTE — DISCHARGE PLACEMENT REQUEST
"Danita Montiel (56 y.o. Female)     Date of Birth Social Security Number Address Home Phone MRN    1965  [address]  [place] [facility] [phone] [id number]    Catholic Marital Status          Anabaptism        Admission Date Admission Type Admitting Provider Attending Provider Department, Room/Bed    8/29/21 Emergency Korin Lopez MD Lowney, Kay, MD [facility] CARE, 2111/1    Discharge Date Discharge Disposition Discharge Destination                       Attending Provider: Korin Lopez MD    Allergies: Clarithromycin    Isolation: Enh Drop/Con   Infection: COVID (confirmed) (08/29/21)   Code Status: CPR    Ht: 167.6 cm (66\")   Wt: 123 kg (270 lb 1 oz)    Admission Cmt: None   Principal Problem: Pneumonia due to COVID-19 virus [U07.1,J12.82]                 Active Insurance as of 8/29/2021     Primary Coverage     Payor Plan Insurance Group Employer/Plan Group    ADAM MEDINA [id number]     Payor Plan Address Payor Plan Phone Number Payor Plan Fax Number Effective Dates    [address] [phone]  4/1/2004 - None Entered    [facility] [id number]       Subscriber Name Subscriber Birth Date Member ID       MELODY MONTIEL 2[birth year]/1962 [id number]                 Emergency Contacts      (Rel.) Home Phone Work Phone Mobile Phone    OLIMELODY (Spouse) [phone] -- [phone]              "

## 2021-09-10 NOTE — PROGRESS NOTES
Daily Progress Note    Pneumonia due to COVID-19 virus    Hypoxia    Atrial fibrillation with RVR (CMS/HCC)    Benign hypertension with CKD (chronic kidney disease) stage III (CMS/[facility])    Assessment    Acute hypoxic respiratory failure secondary to COVID-19 Pneumonia     9/6 on PF 25/85 plan discussed with patient and ; OOB for every meal use IS which was demonstrated at bedside with pull of 500 mL  9/7 patient OOB this am with PF down 25/70 9/8 patient OOB on oxygen 8 liters  910 on 3 L of O2 and feeling better    NEELIMA  Asthma  Hypertension  Hypothyroidism  GERD    Plan    Antibiotic doxycycline and rocephin  Decadron 6 mg d  Vit C/Zinc  Lasix 20 bid  Mucomyst/mucinex  Anticoagulation Xarelto  OOB for meals encourage IS  Bronchodilator/Inhaled corticosteroids  Gycemic control  Regular diet with supplements for less than 50% eaten at meals    Remdesivir completed      COVID-19 LAB PANEL     COVID-19 test result  COVID19   Date Value Ref Range Status   08/29/2021 Detected (C) Not Detected - Ref. Range Final       COVID-19 Prognostic lab results  WBC with differential  Results from last 7 days   Lab Units 09/10/21  0230 09/09/21  0237 09/08/21  0258 09/07/21  0331 09/06/21  0217 09/05/21  0223 09/04/21  0312   WBC 10*3/mm3 12.90* 13.60* 11.90* 15.00* 14.70* 15.20* 15.10*   PLATELETS 10*3/mm3 331 394 365 446 408 365 345   NEUTROS ABS 10*3/mm3 9.16* 9.52* 10.59* 13.05* 12.94* 13.68* 12.53*   LYMPHS ABS 10*3/mm3 2.45 2.72 0.71 1.35 1.03 0.61* 1.51     Inflammatory markers  Results from last 7 days   Lab Units 09/10/21  0230 09/09/21  0642 09/09/21  0237 09/08/21  0258 09/07/21  0331 09/06/21  0217 09/05/21  0223 09/04/21  0312   CRP mg/dL 0.30  --  <0.30 0.46 0.93* 0.81* 1.18* 1.53*   D DIMER QUANT mg/L (FEU) 1.19*  --  1.51* 1.93* 2.71* 1.68* 0.96* 0.83*   ALK PHOS U/L 67 74  --  68 81 70 67 71   AST (SGOT) U/L 17 23  --  15 19 20 19 22   ALT (SGPT) U/L 19 18  --  15 17 16 15 16       Poor COVID-19 outcomes  associated with:  Neutrophil:Lymphocyte ratio >3.5  Thrombocytopenia  LFT's >5x upper limit of normal  LDH >400   LOS: 12 days       Subjective         Objective     Vital signs for last 24 hours:  Vitals:    09/10/21 0235 09/10/21 0319 09/10/21 0321 09/10/21 0500   BP: 145/68   123/63   BP Location: Right arm   Right arm   Patient Position: Lying   Lying   Pulse: 67 63 59    Resp: 18 18 20 18   Temp: 97.4 °F (36.3 °C)   96.4 °F (35.8 °C)   TempSrc: Axillary   Oral   SpO2: 94% 97% 96%    Weight:       Height:           Intake/Output last 3 shifts:  I/O last 3 completed shifts:  In: 1720 [P.O.:720; IV Piggyback:1000]  Out: 2650 [Urine:2650]  Intake/Output this shift:  I/O this shift:  In: -   Out: 1000 [Urine:1000]      Radiology  Imaging Results (Last 24 Hours)     ** No results found for the last 24 hours. **          Labs:  Results from last 7 days   Lab Units 09/10/21  0230   WBC 10*3/mm3 12.90*   HEMOGLOBIN g/dL 11.8*   HEMATOCRIT % 35.7   PLATELETS 10*3/mm3 331     Results from last 7 days   Lab Units 09/10/21  0230   SODIUM mmol/L 137   POTASSIUM mmol/L 5.2   CHLORIDE mmol/L 98   CO2 mmol/L 29.0   BUN mg/dL 27*   CREATININE mg/dL 0.86   CALCIUM mg/dL 8.5*   BILIRUBIN mg/dL 0.2   ALK PHOS U/L 67   ALT (SGPT) U/L 19   AST (SGOT) U/L 17   GLUCOSE mg/dL 105*         Results from last 7 days   Lab Units 09/10/21  0230 09/09/21  0642 09/08/21  0258   ALBUMIN g/dL 3.10* 3.00* 2.90*             Results from last 7 days   Lab Units 09/07/21  0331   MAGNESIUM mg/dL 2.2                   Meds:   SCHEDULE  Acetylcysteine, 600 mg, Oral, BID  albuterol sulfate HFA, 2 puff, Inhalation, Q4H - RT  ascorbic acid, 500 mg, Oral, Daily  budesonide-formoterol, 2 puff, Inhalation, BID - RT  cefTRIAXone, 1 g, Intravenous, Q24H  cholecalciferol, 1,000 Units, Oral, Daily  dexamethasone, 6 mg, Intravenous, Daily  dilTIAZem CD, 120 mg, Oral, Q24H  docusate sodium, 100 mg, Oral, BID  FLUoxetine, 40 mg, Oral, Daily  furosemide, 20 mg,  Intravenous, Q12H  guaiFENesin, 600 mg, Oral, Q12H  insulin lispro, 0-9 Units, Subcutaneous, TID AC  levothyroxine, 112 mcg, Oral, Q AM  montelukast, 10 mg, Oral, Nightly  pantoprazole, 40 mg, Oral, QAM  polyethylene glycol, 17 g, Oral, Daily  rivaroxaban, 20 mg, Oral, Daily With Dinner  sodium chloride, 3 mL, Intravenous, Q12H  traZODone, 50 mg, Oral, Nightly  zinc sulfate, 220 mg, Oral, Daily      Infusions     PRNs  benzonatate  •  dextrose  •  dextrose  •  glucagon (human recombinant)  •  HYDROcod Polst-CPM Polst ER  •  insulin lispro **AND** insulin lispro  •  LORazepam  •  melatonin  •  metoprolol tartrate  •  ondansetron  •  sodium chloride  •  [COMPLETED] Insert peripheral IV **AND** sodium chloride  •  sodium chloride  •  zolpidem    Physical Exam:  Physical Exam  Vitals reviewed.   Constitutional:       Appearance: She is ill-appearing.   Pulmonary:      Breath sounds: Rhonchi present.   Skin:     General: Skin is warm and dry.   Neurological:      Mental Status: She is alert and oriented to person, place, and time.         ROS  Review of Systems   Constitutional: Positive for activity change.   Respiratory: Positive for cough and shortness of breath.        I reviewed the patient's new clinical results.      Electronically signed by TOMMY Mcfadden, 09/10/21 at 10:43 EDT.

## 2021-09-10 NOTE — PLAN OF CARE
Pt would benefit from Home with family assist at discharge from facility and requires rolling walker at discharge.   Pt desires Home with family assist at discharge. Pt cooperative; agreeable to therapeutic recommendations and plan of care. Pt.is refusing HH,  reports he will be home with her.

## 2021-09-10 NOTE — CASE MANAGEMENT/SOCIAL WORK
Continued Stay Note  JOVANNY Potts     Patient Name: Danita Montiel  MRN: [id number]  Today's Date: 9/10/2021    Admit Date: 8/29/2021    Discharge Plan     Row Name 09/10/21 1252       Plan    Plan Comments  patient completed walking oximetry and did not qualify for home oxygen. Cm updated MD. CM called and spoke to patient and patient spouse regarding home health care. Patient and patient spouse decline at this time. patient spouse states he will be staying with her and they feel okay at this time to DC home. Patient states she is worried about not having oxygen at home. Patient does have pulse ox at home to check her levels. CM instructed patient that if she changes her mind on HHC she can call her PCP office or hospital to have arranged.        Expected Discharge Date and Time     Expected Discharge Date Expected Discharge Time    Sep 13, 2021         Phone communication or documentation only - no physical contact with patient or family.      Ammy Guillen RN

## 2021-09-10 NOTE — CASE MANAGEMENT/SOCIAL WORK
Continued Stay Note  JOVANNY Delroy     Patient Name: Danita Montiel  MRN: [id number]  Today's Date: 9/10/2021    Admit Date: 8/29/2021    Discharge Plan     Row Name 09/10/21 1531       Plan    Plan  anticipate home with family and new home oxygen through [facility]. declined HHC.    Patient/Family in Agreement with Plan  yes    Plan Comments  Patient desat with PT on RA. needs 2L. per [place] with [facility] they can supply her with home oxygen. orders sent in epic and bedside RN updated. RW ordered as well.    Final Discharge Disposition Code  01 - home or self-care    Final Note  home    Row Name 09/10/21 1252       Plan    Plan Comments  patient completed walking oximetry and did not qualify for home oxygen. Cm updated MD. CM called and spoke to patient and patient spouse regarding home health care. Patient and patient spouse decline at this time. patient spouse states he will be staying with her and they feel okay at this time to DC home. Patient states she is worried about not having oxygen at home. Patient does have pulse ox at home to check her levels. CM instructed patient that if she changes her mind on HHC she can call her PCP office or hospital to have arranged.        Expected Discharge Date and Time     Expected Discharge Date Expected Discharge Time    Sep 13, 2021         Phone communication or documentation only - no physical contact with patient or family.      Ammy Guillen, RN

## 2021-09-10 NOTE — SIGNIFICANT NOTE
Discharging home with spouse. Discharge instructions given. Event monitor at discharge. To follow up with cardiology

## 2021-09-10 NOTE — PROGRESS NOTES
Exercise Oximetry    Patient Name:Danita Montiel   MRN: [id number]   Date: 09/10/21             ROOM AIR BASELINE   SpO2% 94   Heart Rate    Blood Pressure      EXERCISE ON ROOM AIR SpO2% EXERCISE ON O2 @  LPM SpO2%   1 MINUTE 94 1 MINUTE    2 MINUTES 92 2 MINUTES    3 MINUTES 90 3 MINUTES    4 MINUTES 90 4 MINUTES    5 MINUTES 91 5 MINUTES    6 MINUTES 90 6 MINUTES               Distance Walked   Distance Walked   Dyspnea (Jaimee Scale)   Dyspnea (Jaimee Scale)   Fatigue (Jaimee Scale)   Fatigue (Jaimee Scale)   SpO2% Post Exercise  94 SpO2% Post Exercise   HR Post Exercise   HR Post Exercise   Time to Recovery   Time to Recovery     Comments: Room air sats 90-94% during walk.  Required walker.

## 2021-09-10 NOTE — PLAN OF CARE
Goal Outcome Evaluation:  Pt currently on 3L NC and satting in mid-upper 90s. Pt sleeping well throughout course of shift w/no c/o SOA or discomfort. Other vitals stable. Will continue to monitor.

## 2021-09-10 NOTE — PLAN OF CARE
Problem: Adult Inpatient Plan of Care  Goal: Plan of Care Review  Outcome: Met  Flowsheets (Taken 9/10/2021 1653)  Plan of Care Reviewed With:   patient   spouse  Outcome Summary: patient discharging home with spouse. oxygen sent home with patient. event monitor at discharge. meds to bed at discharge as well.  Goal: Patient-Specific Goal (Individualized)  Outcome: Met  Goal: Absence of Hospital-Acquired Illness or Injury  Outcome: Met  Intervention: Identify and Manage Fall Risk  Description: Perform standard risk assessment with a validated tool or comprehensive approach appropriate to the patient on admission; reassess fall risk frequently, with change in status or transfer to another level of care.  Communicate fall injury risk to interprofessional healthcare team.  Determine need for increased observation, equipment and environmental modification, such as low bed and signage, as well as supportive, nonskid footwear.  Adjust safety measures to individual developmental age, stage and identified risk factors.  Reinforce the importance of safety and physical activity with patient and family.  Perform regular intentional rounding to assess need for position change, pain assessment, personal needs, including assistance with toileting.  Recent Flowsheet Documentation  Taken 9/10/2021 1525 by Teresa Trejo, RN  Safety Promotion/Fall Prevention:   activity supervised   assistive device/personal items within reach   clutter free environment maintained   fall prevention program maintained   gait belt   nonskid shoes/slippers when out of bed   safety round/check completed  Intervention: Prevent Skin Injury  Description: Assess skin risk on admission and at regular intervals throughout hospital stay.  Keep all areas of skin (especially folds) clean and dry.  Maintain adequate skin hydration.  Relieve and redistribute pressure and protect bony prominences; implement measures based on patient-specific risk factors.  Match  turning and repositioning schedule to clinical condition.  Encourage weight shift frequently; assist with reposition if unable to complete independently.  Float heels off bed. Avoid pressure on the Achilles tendon.  Keep skin free from extended contact with medical devices.  Use aids (e.g., slide boards, mechanical lift) during transfer.  Recent Flowsheet Documentation  Taken 9/10/2021 1525 by Teresa Trejo RN  Body Position: position changed independently  Goal: Optimal Comfort and Wellbeing  Outcome: Met  Goal: Readiness for Transition of Care  Outcome: Met   Goal Outcome Evaluation:  Plan of Care Reviewed With: patient, spouse           Outcome Summary: patient discharging home with spouse. oxygen sent home with patient. event monitor at discharge. meds to bed at discharge as well.

## 2021-09-11 ENCOUNTER — NURSE TRIAGE (OUTPATIENT)
Dept: CALL CENTER | Facility: HOSPITAL | Age: 56
End: 2021-09-11

## 2021-09-11 ENCOUNTER — READMISSION MANAGEMENT (OUTPATIENT)
Dept: CALL CENTER | Facility: HOSPITAL | Age: 56
End: 2021-09-11

## 2021-09-11 NOTE — OUTREACH NOTE
COVID-19 Week 1 Survey      Responses   [facility] patient discharged from?  [facility]   Does the patient have one of the following disease processes/diagnoses(primary or secondary)?  COVID-19   COVID-19 underlying condition?  None   Call Number  Call 1   Week 1 Call successful?  Yes   Call start time  1106   Call end time  1115   Discharge diagnosis  Hypoxia, PNA d/t COVID-19, A-fib RVR, Hx asthma,    Is patient permission given to speak with other caregiver?  Yes   Person spoke with today (if not patient) and relationship  Spouse Lorenzo Thornton reviewed with patient/caregiver?  Yes   Is the patient having any side effects they believe may be caused by any medication additions or changes?  No   Does the patient have all medications ordered at discharge?  Yes   Is the patient taking all medications as directed (includes completed medication regime)?  Yes   Does the patient have a primary care provider?   Yes   Does the patient have an appointment with their PCP or specialist within 7 days of discharge?  Yes   Has the patient kept scheduled appointments due by today?  N/A   Has home health visited the patient within 72 hours of discharge?  N/A   What DME was ordered?  O2 from [place], has home pulse ox   Has all DME been delivered?  Yes   Psychosocial issues?  No   Did the patient receive a copy of their discharge instructions?  Yes   Did the patient receive a copy of COVID-19 specific instructions?  Yes   Nursing interventions  Reviewed instructions with patient   What is the patient's perception of their health status since discharge?  Improving   Does the patient have any of the following symptoms?  Shortness of breath [soa on exertion]   Nursing Interventions  Nurse provided patient education   Pulse Ox monitoring  Intermittent   Pulse Ox device source  Patient   O2 Sat comments  90% on ra...waiting on O2...   O2 Sat: education provided  Sat levels, Monitoring frequency, When to seek care   O2 Sat education  comments  If 90% or below and stays there, call 911.   Is the patient/caregiver able to teach back steps to recovery at home?  Set small, achievable goals for return to baseline health, Rest and rebuild strength, gradually increase activity, Eat a well-balance diet, Make a list of questions for provider's appointment   If the patient is a current smoker, are they able to teach back resources for cessation?  Not a smoker   Is the patient/caregiver able to teach back the hierarchy of who to call/visit for symptoms/problems? PCP, Specialist, Home health nurse, Urgent Care, ED, 911  Yes   COVID-19 call completed?  Yes   Wrap up additional comments  Encouraged toothbrush change and makeup change, she is resting at this time.          Rylee Solomon RN

## 2021-09-11 NOTE — TELEPHONE ENCOUNTER
O2 is ra wild, called [facility] and she they will deliver asap  Reason for Disposition  • Nursing judgment    Additional Information  • Negative: Nursing judgment  • Negative: Information only call; adult is not ill or injured  • Negative: Nursing judgment  • Negative: Nursing judgment  • Negative: Nursing judgment  • Negative: Nursing judgment  • Negative: Nursing judgment  • Negative: Nursing judgment  • Negative: Nursing judgment  • Negative: Nursing judgment  • Negative: Nursing judgment  • Negative: Nursing judgment  • Negative: Nursing judgment  • Negative: Nursing judgment  • Negative: Nursing judgment    Protocols used: NO GUIDELINE OR REFERENCE AVAILABLE[facility]

## 2021-09-11 NOTE — OUTREACH NOTE
Prep Survey      Responses   [facility] discharged from?  [facility]   Is LACE score < 7 ?  No   Emergency Room discharge w/ pulse ox?  No   Eligibility  Readm Mgmt   Discharge diagnosis  Hypoxia, PNA d/t COVID-19, A-fib RVR, Hx asthma,    Does the patient have one of the following disease processes/diagnoses(primary or secondary)?  COVID-19   Does the patient have Home health ordered?  No   Is there a DME ordered?  Yes   What DME was ordered?  O2 from [place], has home pulse ox   Prep survey completed?  Yes          Aneta More RN

## 2021-09-12 ENCOUNTER — READMISSION MANAGEMENT (OUTPATIENT)
Dept: CALL CENTER | Facility: HOSPITAL | Age: 56
End: 2021-09-12

## 2021-09-12 NOTE — OUTREACH NOTE
COVID-19 Week 1 Survey      Responses   [facility] patient discharged from?  [facility]   Does the patient have one of the following disease processes/diagnoses(primary or secondary)?  COVID-19   COVID-19 underlying condition?  None   Call Number  Call 2   Week 1 Call successful?  Yes   Call start time  1235   Call end time  1239   Discharge diagnosis  Hypoxia, PNA d/t COVID-19, A-fib RVR, Hx asthma,    Psychosocial issues?  No   Pulse Ox monitoring  Intermittent   Pulse Ox device source  Patient   O2 Sat comments  89-91%--not sure how much oxygen she is on . Enc to check and she may need to turn it up a small amount- can discuss with her PCP   O2 Sat: education provided  Sat levels, When to seek care   Is the patient/caregiver able to teach back the hierarchy of who to call/visit for symptoms/problems? PCP, Specialist, Home health nurse, Urgent Care, ED, 911  Yes   COVID-19 call completed?  Yes   Wrap up additional comments  Enc fluids and walking around house. Did not do IS but I told her she rerally needs to contrate on this and good, deep breathing techniques.           Tracee Mccarthy, RN

## 2021-09-13 ENCOUNTER — READMISSION MANAGEMENT (OUTPATIENT)
Dept: CALL CENTER | Facility: HOSPITAL | Age: 56
End: 2021-09-13

## 2021-09-13 NOTE — OUTREACH NOTE
COVID-19 Week 1 Survey      Responses   [facility] patient discharged from?  [facility]   Does the patient have one of the following disease processes/diagnoses(primary or secondary)?  COVID-19   COVID-19 underlying condition?  None   Call Number  Call 3   Week 1 Call successful?  Yes   Call start time  1249   Call end time  1252   Discharge diagnosis  Hypoxia, PNA d/t COVID-19, A-fib RVR, Hx asthma,    Psychosocial issues?  No   Does the patient have any of the following symptoms?  Shortness of breath [[place]]   Pulse Ox monitoring  Intermittent   Pulse Ox device source  Patient   O2 Sat comments  mid to high 80's. Increases to low 90's when she rests.   O2 Sat: education provided  Sat levels, When to seek care   O2 Sat education comments  If 90% or below and stays there, call 911.   COVID-19 call completed?  Yes   Wrap up additional comments  Enc fluids and walking around house. Did not do IS but I told her she really needs to concentrate on this and good, deep breathing techniques.           Tracee Mccarthy RN

## 2021-09-14 NOTE — PAYOR COMM NOTE
"DISCHARGE NOTICE     Please be advised this patient discharged home on 9/10/21.  Danita MontielRONNIE 61  AUTH: [id number]    Danita Montiel (56 y.o. Female)     Date of Birth Social Security Number Address Home Phone MRN    1965  [id number]  [place] [address] [phone] [id number]    Muslim Marital Status          Lutheran        Admission Date Admission Type Admitting Provider Attending Provider Department, Room/Bed    21 Emergency Korin Lopez MD  [facility],     Discharge Date Discharge Disposition Discharge Destination        9/10/2021 Home or Self Care              Attending Provider: (none)   Allergies: Clarithromycin    Isolation: None   Infection: COVID (confirmed) (21)   Code Status: Prior    Ht: 167.6 cm (66\")   Wt: 123 kg (270 lb 1 oz)    Admission Cmt: None   Principal Problem: Pneumonia due to COVID-19 virus [U07.1,J12.82]               Active Insurance as of 2021     Primary Coverage     Payor Plan Insurance Group Employer/Plan Group    CIG[employer] CIGNA [id number]     Payor Plan Address Payor Plan Phone Number Payor Plan Fax Number Effective Dates    [address] [phone]  2004 - None Entered    [facility] [id number]       Subscriber Name Subscriber Birth Date Member ID       MELODY MONTIEL 2/3/1964 [id number]                    Discharge Summary      Jessica Donis, APRN at 09/10/21 1651     Attestation signed by Korin Lopez MD at 21 1012    I have reviewed this documentation and agree.                    Date of Discharge:  9/10/2021    Discharge Diagnosis:   Covid 19 pneumonia- Finished remdesivir, rocephin, doxy - home with bronchodilators, vitamins- o2 prn     Hypoxia- 02 with activity     Afib with RVR- Converted to sinus on cardizem- home on po diltiazem         Presenting Problem/History of Present Illness  Active Hospital Problems    Diagnosis  POA   • **Pneumonia due to COVID-19 virus " [U07.1, J12.82]  Yes   • Cytokine release syndrome, grade 2 [D89.832]  Yes   • Atrial fibrillation with RVR (CMS/HCC) [I48.91]  No   • Benign hypertension with CKD (chronic kidney disease) stage III (CMS/HCC) [I12.9, N18.30]  Yes   • Hypoxia [R09.02]  Yes      Resolved Hospital Problems   No resolved problems to display.          Hospital Course  Patient is a 56 y.o. female presented with soa , cough and vomiting. Er found pt with hypoxia that required 6 L of o2. She has underlying asthma and hx PE. She had remdesivir, steroids, bronchodilators and was followed by pulmonology. Oxygen demands increased and temperature increased. Antibiotics were added in. Pt is with known alcohol abuse history and was monitored for signs of withdraw. She required High flow o2 and at one time had an abg with po2 of 50 .Lasix was added in  And steroids where increased. She went into afib with RVR. Cardiology was consulted and cardizem drip was started. She was maintained on home xarelto for anticoagulation. She spontaneously converted to SR and was maintained on po diltiazem. She began to improve over time and oxygen was weaned down. Today she is at 2l nasal o2 with sats in 90s. She did a 6 min walk and did not desat on RA yet with PT her sats dropped to 83 on RA. She is deemed stable for home today . She will go home with o2 to use as needed with activity . She will have an event monitor placed and Fu with cardio for the Afib.     Procedures Performed         Consults:   Consults     Date and Time Order Name Status Description    9/3/2021  8:21 AM Inpatient Cardiology Consult      8/31/2021  5:17 AM Inpatient Cardiology Consult Completed     8/30/2021 12:47 PM Inpatient Pulmonology Consult Completed     8/29/2021  5:20 PM Inpatient Pulmonology Consult      8/29/2021  1:16 PM Family Medicine Consult Completed           Pertinent Test Results:    Lab Results (most recent)     Procedure Component Value Units Date/Time    POC Glucose Once  [[id number]]  (Abnormal) Collected: 09/10/21 1641    Specimen: Blood Updated: 09/10/21 1642     Glucose 129 mg/dL      Comment: Serial Number: [id number]:  [id number]       POC Glucose Once [[id number]]  (Abnormal) Collected: 09/10/21 1235    Specimen: Blood Updated: 09/10/21 1236     Glucose 116 mg/dL      Comment: Serial Number: [id number]:  [id number]       Manual Differential [[id number]]  (Abnormal) Collected: 09/10/21 0230    Specimen: Blood Updated: 09/10/21 0342     Neutrophil % 70.0 %      Lymphocyte % 19.0 %      Monocyte % 8.0 %      Bands %  1.0 %      Metamyelocyte % 1.0 %      Myelocyte % 1.0 %      Neutrophils Absolute 9.16 10*3/mm3      Lymphocytes Absolute 2.45 10*3/mm3      Monocytes Absolute 1.03 10*3/mm3      RBC Morphology Normal     WBC Morphology Normal     Platelet Morphology Normal    CBC & Differential [[id number]]  (Abnormal) Collected: 09/10/21 0230    Specimen: Blood Updated: 09/10/21 0342    Narrative:      The following orders were created for panel order CBC & Differential.  Procedure                               Abnormality         Status                     ---------                               -----------         ------                     CBC Auto Differential[[id number]]        Abnormal            Final result               Scan Slide[[id number]]                                       Final result                 Please view results for these tests on the individual orders.    Scan Slide [[id number]] Collected: 09/10/21 0230    Specimen: Blood Updated: 09/10/21 0342     Scan Slide --     Comment: See Manual Differential Results       CBC Auto Differential [[id number]]  (Abnormal) Collected: 09/10/21 0230    Specimen: Blood Updated: 09/10/21 0342     WBC 12.90 10*3/mm3      RBC 3.86 10*6/mm3      Hemoglobin 11.8 g/dL      Hematocrit 35.7 %      MCV 92.5 fL      MCH 30.6 pg      MCHC 33.1 g/dL      RDW 13.9 %      RDW-SD 45.1 fl      MPV 7.7 fL      Platelets 331 10*3/mm3      Narrative:      The previously reported component NRBC is no longer being reported. Previous result was 0.0 /100 WBC (Reference Range: 0.0-0.2 /100 WBC) on 9/10/2021 at 0257 EDT.    C-reactive Protein [[id number]]  (Normal) Collected: 09/10/21 0230    Specimen: Blood Updated: 09/10/21 0341     C-Reactive Protein 0.30 mg/dL     Comprehensive Metabolic Panel [[id number]]  (Abnormal) Collected: 09/10/21 0230    Specimen: Blood Updated: 09/10/21 0321     Glucose 105 mg/dL      BUN 27 mg/dL      Creatinine 0.86 mg/dL      Sodium 137 mmol/L      Potassium 5.2 mmol/L      Comment: Slight hemolysis detected by analyzer. Results may be affected.        Chloride 98 mmol/L      CO2 29.0 mmol/L      Calcium 8.5 mg/dL      Total Protein 5.3 g/dL      Albumin 3.10 g/dL      ALT (SGPT) 19 U/L      AST (SGOT) 17 U/L      Comment: Slight hemolysis detected by analyzer. Results may be affected.        Alkaline Phosphatase 67 U/L      Total Bilirubin 0.2 mg/dL      eGFR Non African Amer 68 mL/min/1.73      Globulin 2.2 gm/dL      A/G Ratio 1.4 g/dL      BUN/Creatinine Ratio 31.4     Anion Gap 10.0 mmol/L     Narrative:      GFR Normal >60  Chronic Kidney Disease <60  Kidney Failure <15      Bilirubin, Direct [[id number]]  (Normal) Collected: 09/10/21 0230    Specimen: Blood Updated: 09/10/21 0321     Bilirubin, Direct <0.2 mg/dL      Comment: Specimen hemolyzed. Results may be affected.       D-dimer, Quantitative [[id number]]  (Abnormal) Collected: 09/10/21 0230    Specimen: Blood Updated: 09/10/21 0308     D-Dimer, Quantitative 1.19 mg/L (FEU)     Narrative:      Reference Range  --------------------------------------------------------------------     < 0.50   Negative Predictive Value  0.50-0.59   Indeterminate    >= 0.60   Probable VTE             A very low percentage of patients with DVT may yield D-Dimer results   below the cut-off of 0.50 mg/L FEU.  This is known to be more   prevalent in patients with distal DVT.              Results of this test should always be interpreted in conjunction with   the patient's medical history, clinical presentation and other   findings.  Clinical diagnosis should not be based on the result of   INNOVANCE D-Dimer alone.    Comprehensive Metabolic Panel [[id number]]  (Abnormal) Collected: 09/09/21 0642    Specimen: Blood Updated: 09/09/21 0715     Glucose 87 mg/dL      BUN 31 mg/dL      Creatinine 0.88 mg/dL      Sodium 134 mmol/L      Potassium 4.4 mmol/L      Comment: Slight hemolysis detected by analyzer. Results may be affected.        Chloride 95 mmol/L      CO2 32.0 mmol/L      Calcium 8.5 mg/dL      Total Protein 5.7 g/dL      Albumin 3.00 g/dL      ALT (SGPT) 18 U/L      AST (SGOT) 23 U/L      Alkaline Phosphatase 74 U/L      Total Bilirubin 0.2 mg/dL      eGFR Non African Amer 66 mL/min/1.73      Globulin 2.7 gm/dL      A/G Ratio 1.1 g/dL      BUN/Creatinine Ratio 35.2     Anion Gap 7.0 mmol/L     Narrative:      GFR Normal >60  Chronic Kidney Disease <60  Kidney Failure <15      Manual Differential [[id number]]  (Abnormal) Collected: 09/09/21 0237    Specimen: Blood Updated: 09/09/21 0347     Neutrophil % 70.0 %      Lymphocyte % 20.0 %      Monocyte % 8.0 %      Eosinophil % 1.0 %      Myelocyte % 1.0 %      Neutrophils Absolute 9.52 10*3/mm3      Lymphocytes Absolute 2.72 10*3/mm3      Monocytes Absolute 1.09 10*3/mm3      Eosinophils Absolute 0.14 10*3/mm3      RBC Morphology Normal     WBC Morphology Normal     Platelet Morphology Normal    CBC & Differential [[id number]]  (Abnormal) Collected: 09/09/21 0237    Specimen: Blood Updated: 09/09/21 0347    Narrative:      The following orders were created for panel order CBC & Differential.  Procedure                               Abnormality         Status                     ---------                               -----------         ------                     CBC Auto Differential[[id number]]        Abnormal            Final result                Scan Slide[[id number]]                                       Final result                 Please view results for these tests on the individual orders.    Scan Slide [[id number]] Collected: 09/09/21 0237    Specimen: Blood Updated: 09/09/21 0347     Scan Slide --     Comment: See Manual Differential Results       CBC Auto Differential [[id number]]  (Abnormal) Collected: 09/09/21 0237    Specimen: Blood Updated: 09/09/21 0347     WBC 13.60 10*3/mm3      RBC 3.62 10*6/mm3      Hemoglobin 11.5 g/dL      Hematocrit 33.9 %      MCV 93.5 fL      MCH 31.8 pg      MCHC 34.0 g/dL      RDW 13.4 %      RDW-SD 43.3 fl      MPV 8.1 fL      Platelets 394 10*3/mm3     Narrative:      The previously reported component NRBC is no longer being reported. Previous result was 0.0 /100 WBC (Reference Range: 0.0-0.2 /100 WBC) on 9/9/2021 at 0308 EDT.    C-reactive Protein [[id number]]  (Normal) Collected: 09/09/21 0237    Specimen: Blood Updated: 09/09/21 0344     C-Reactive Protein <0.30 mg/dL     Bilirubin, Direct [[id number]]  (Normal) Collected: 09/09/21 0237    Specimen: Blood Updated: 09/09/21 0330     Bilirubin, Direct <0.2 mg/dL     D-dimer, Quantitative [[id number]]  (Abnormal) Collected: 09/09/21 0237    Specimen: Blood Updated: 09/09/21 0310     D-Dimer, Quantitative 1.51 mg/L (FEU)     Narrative:      Reference Range  --------------------------------------------------------------------     < 0.50   Negative Predictive Value  0.50-0.59   Indeterminate    >= 0.60   Probable VTE             A very low percentage of patients with DVT may yield D-Dimer results   below the cut-off of 0.50 mg/L FEU.  This is known to be more   prevalent in patients with distal DVT.             Results of this test should always be interpreted in conjunction with   the patient's medical history, clinical presentation and other   findings.  Clinical diagnosis should not be based on the result of   INNOVANCE D-Dimer alone.    Magnesium [[id number]]  (Normal)  Collected: 09/07/21 0331    Specimen: Blood Updated: 09/07/21 0441     Magnesium 2.2 mg/dL     Blood Culture - Blood, Hand, Left [[id number]] Collected: 08/30/21 1549    Specimen: Blood from Hand, Left Updated: 09/04/21 1632     Blood Culture No growth at 5 days    Blood Culture - Blood, Arm, Right [[id number]] Collected: 08/30/21 1549    Specimen: Blood from Arm, Right Updated: 09/04/21 1632     Blood Culture No growth at 5 days    Calcium, Ionized [[id number]]  (Normal) Collected: 09/03/21 0839    Specimen: Blood Updated: 09/03/21 0852     Ionized Calcium 1.21 mmol/L     Blood Gas, Arterial - [[id number]]  (Abnormal) Collected: 09/01/21 0855    Specimen: Arterial Blood Updated: 09/01/21 0858     Site Right Radial     Jose's Test Positive     pH, Arterial 7.421 pH units      pCO2, Arterial 31.9 mm Hg      pO2, Arterial 50.4 mm Hg      HCO3, Arterial 20.7 mmol/L      Base Excess, Arterial -2.7 mmol/L      Comment: Serial Number: [id number]:  [id number]        O2 Saturation, Arterial 86.5 %      CO2 Content 21.7 mmol/L      Barometric Pressure for Blood Gas --     Comment: N/A        Modality NRB     FIO2 100 %      Hemodilution No    TSH [[id number]]  (Normal) Collected: 08/30/21 0839    Specimen: Blood Updated: 08/30/21 1320     TSH 0.362 uIU/mL     Creatinine, Serum [[id number]]  (Abnormal) Collected: 08/29/21 1215    Specimen: Blood Updated: 08/29/21 1955     Creatinine 1.32 mg/dL      eGFR Non African Amer 41 mL/min/1.73     Narrative:      GFR Normal >60  Chronic Kidney Disease <60  Kidney Failure <15      Hepatic Function Panel [[id number]]  (Abnormal) Collected: 08/29/21 1215    Specimen: Blood Updated: 08/29/21 1955     Total Protein 7.4 g/dL      Albumin 4.00 g/dL      ALT (SGPT) 27 U/L      AST (SGOT) 43 U/L      Alkaline Phosphatase 80 U/L      Total Bilirubin 0.4 mg/dL      Bilirubin, Direct <0.2 mg/dL      Bilirubin, Indirect --     Comment: Unable to calculate       COVID PRE-OP / PRE-PROCEDURE SCREENING  ORDER (NO ISOLATION) - Swab, Nasopharynx [[id number]]  (Abnormal) Collected: 08/29/21 1550    Specimen: Swab from Nasopharynx Updated: 08/29/21 1644    Narrative:      The following orders were created for panel order COVID PRE-OP / PRE-PROCEDURE SCREENING ORDER (NO ISOLATION) - Swab, Nasopharynx.  Procedure                               Abnormality         Status                     ---------                               -----------         ------                     COVID-19,CEPHEID/GHAZAL/BD...[[id number]]  Abnormal            Final result                 Please view results for these tests on the individual orders.    COVID-19,CEPHEID/GHAZAL/BDMAX,COR/CARLOS/PAD/[facility] IN-HOUSE(OR EMERGENT/ADD-ON),NP SWAB IN TRANSPORT MEDIA 3-4 HR TAT, RT-PCR - Swab, Nasopharynx [[id number]]  (Abnormal) Collected: 08/29/21 1550    Specimen: Swab from Nasopharynx Updated: 08/29/21 1644     COVID19 Detected    Narrative:      Fact sheet for providers: https://www.fda.gov/media/082319/download     Fact sheet for patients: https://www.fda.gov/media/136861/download  Fact sheet for providers: https://www.fda.gov/media/175769/download    Fact sheet for patients: https://www.fda.gov/media/069083/download    Test performed by PCR.    [place] Draw [[id number]] Collected: 08/29/21 1215    Specimen: Blood Updated: 08/29/21 1330    Narrative:      The following orders were created for panel order [place] Draw.  Procedure                               Abnormality         Status                     ---------                               -----------         ------                     Green Top (Gel)[[id number]]                                  Final result               Lavender Top[[id number]]                                     Final result               Gold Top - SST[[id number]]                                   Final result               Light Blue Top[[id number]]                                   Final result                 Please view results for these  tests on the individual orders.    Light Blue Top [[id number]] Collected: 08/29/21 1215    Specimen: Blood Updated: 08/29/21 1330     Extra Tube hold for add-on     Comment: Auto resulted       Lavender Top [[id number]] Collected: 08/29/21 1215    Specimen: Blood Updated: 08/29/21 1252    Green Top (Gel) [[id number]] Collected: 08/29/21 1215    Specimen: Blood Updated: 08/29/21 1242    Gold Top - SST [[id number]] Collected: 08/29/21 1227    Specimen: Blood Updated: 08/29/21 1241    Blood Gas, Arterial - [[id number]]  (Abnormal) Collected: 08/29/21 1236    Specimen: Arterial Blood Updated: 08/29/21 1238     Site Right Brachial     Jose's Test Positive     pH, Arterial 7.449 pH units      pCO2, Arterial 30.0 mm Hg      pO2, Arterial 65.7 mm Hg      HCO3, Arterial 20.8 mmol/L      Base Excess, Arterial -2.1 mmol/L      Comment: Serial Number: [id number]:  [id number]        O2 Saturation, Arterial 93.9 %      CO2 Content 21.7 mmol/L      Barometric Pressure for Blood Gas --     Comment: N/A        Modality Cannula     FIO2 32 %      Hemodilution No    POC Lactate [[id number]]  (Normal) Collected: 08/29/21 1220    Specimen: Blood Updated: 08/29/21 1221     Lactate 1.1 mmol/L      Comment: Serial Number: [id number]:  [id number]              Results for orders placed during the hospital encounter of 12/28/20    Adult Transthoracic Echo Complete W/ Cont if Necessary Per Protocol    Interpretation Summary  · Left ventricular wall thickness is consistent with moderate concentric hypertrophy.  · Estimated left ventricular EF was in agreement with the calculated left ventricular EF. Left ventricular ejection fraction appears to be 66 - 70%. Left ventricular systolic function is normal.  · Left ventricular diastolic function is consistent with (grade Ia w/high LAP) impaired relaxation.  · Estimated right ventricular systolic pressure from tricuspid regurgitation is mildly elevated (35-45 mmHg).  · Mild pulmonary hypertension is  present.         Condition on Discharge: stable    Vital Signs  Temp:  [96.4 °F (35.8 °C)-98 °F (36.7 °C)] 98 °F (36.7 °C)  Heart Rate:  [] 80  Resp:  [18-22] 20  BP: (123-158)/(48-74) 158/66    Physical Exam:     General Appearance:    Alert, cooperative, in no acute distress   Head:    Normocephalic, without obvious abnormality, atraumatic   Eyes:            Lids and lashes normal, conjunctivae and sclerae normal, no   icterus, no pallor, corneas clear, PERRLA   Ears:    Ears appear intact with no abnormalities noted   Throat:   No oral lesions, no thrush, oral mucosa moist   Neck:   No adenopathy, supple, trachea midline, no thyromegaly, no   carotid bruit, no JVD   Lungs:     Rare wheeze- otherwise clear respirations regular, even and                  unlabored    Heart:    Regular rhythm and normal rate, normal S1 and S2, no            murmur, no gallop, no rub, no click   Chest Wall:    No abnormalities observed   Abdomen:     Normal bowel sounds, no masses, no organomegaly, soft        non-tender, non-distended, no guarding, no rebound                tenderness   Extremities:   Moves all extremities well, no edema, no cyanosis, no             redness   Pulses:   Pulses palpable and equal bilaterally   Skin:   No bleeding, bruising or rash   Lymph nodes:   No palpable adenopathy   Neurologic:   Cranial nerves 2 - 12 grossly intact, sensation intact, DTR       present and equal bilaterally       Discharge Disposition  Home or Self Care    Discharge Medications     Discharge Medications      New Medications      Instructions Start Date   Acetylcysteine capsule capsule   600 mg, Oral, 2 Times Daily      ascorbic acid 500 MG tablet  Commonly known as: VITAMIN C   500 mg, Oral, Daily   Start Date: September 11, 2021     benzonatate 200 MG capsule  Commonly known as: TESSALON   200 mg, Oral, 3 Times Daily PRN      cholecalciferol 25 MCG (1000 UT) tablet  Commonly known as: VITAMIN D3   1,000 Units, Oral,  Daily   Start Date: September 11, 2021     dexamethasone 1 MG tablet  Commonly known as: DECADRON   1 mg, Oral, 2 Times Daily With Meals, 4 po qd for 4 d then 3 po qd for 4 d then 2 po qd for 4 d then 1 po qd for 4 d then stop      dilTIAZem  MG 24 hr capsule  Commonly known as: CARDIZEM CD   120 mg, Oral, Every 24 Hours Scheduled   Start Date: September 11, 2021     docusate sodium 100 MG capsule   100 mg, Oral, 2 Times Daily      furosemide 20 MG tablet  Commonly known as: Lasix   20 mg, Oral, Daily      guaiFENesin 600 MG 12 hr tablet  Commonly known as: MUCINEX   600 mg, Oral, Every 12 Hours Scheduled      polyethylene glycol 17 g packet  Commonly known as: MIRALAX   17 g, Oral, Daily   Start Date: September 11, 2021     zinc sulfate 220 (50 Zn) MG capsule  Commonly known as: ZINCATE   220 mg, Oral, Daily   Start Date: September 11, 2021        Changes to Medications      Instructions Start Date   rivaroxaban 20 MG tablet  Commonly known as: XARELTO  What changed:   · how much to take  · when to take this  · Another medication with the same name was removed. Continue taking this medication, and follow the directions you see here.   20 mg, Oral, Daily With Dinner         Continue These Medications      Instructions Start Date   albuterol sulfate  (90 Base) MCG/ACT inhaler  Commonly known as: PROVENTIL HFA;VENTOLIN HFA;PROAIR HFA   2 puffs, Inhalation, Every 4 Hours PRN      FLUoxetine 40 MG capsule  Commonly known as: PROzac   40 mg, Oral, Daily      fluticasone-salmeterol 250-50 MCG/DOSE DISKUS  Commonly known as: ADVAIR   1 puff, Inhalation, 2 times daily      levothyroxine 112 MCG tablet  Commonly known as: SYNTHROID, LEVOTHROID   112 mcg, Oral, Daily      montelukast 10 MG tablet  Commonly known as: SINGULAIR   10 mg, Oral, Nightly      omeprazole 40 MG capsule  Commonly known as: priLOSEC   40 mg, Oral, Daily      ondansetron 8 MG tablet  Commonly known as: ZOFRAN   8 mg, Oral, 4 Times Daily  PRN      traZODone 50 MG tablet  Commonly known as: DESYREL   50 mg, Oral, Nightly         Stop These Medications    Black Cohosh 160 MG capsule     Exforge HCT -25 MG tablet  Generic drug: amLODIPine-Valsartan-HCTZ     fexofenadine 180 MG tablet  Commonly known as: ALLEGRA     metoprolol tartrate 50 MG tablet  Commonly known as: LOPRESSOR     Turmeric 500 MG capsule            Discharge Diet:     Activity at Discharge:     Follow-up Appointments  No future appointments.      Test Results Pending at Discharge       TOMMY Barrett  09/10/21  16:51 EDT    Time: Discharge 25 min            Electronically signed by Korin Lopez MD at 09/11/21 1012

## 2021-09-17 ENCOUNTER — READMISSION MANAGEMENT (OUTPATIENT)
Dept: CALL CENTER | Facility: HOSPITAL | Age: 56
End: 2021-09-17

## 2021-09-17 NOTE — OUTREACH NOTE
COVID-19 Week 2 Survey      Responses   [facility] patient discharged from?  [facility]   Does the patient have one of the following disease processes/diagnoses(primary or secondary)?  COVID-19   COVID-19 underlying condition?  None   Call Number  Call 1   COVID-19 Week 2: Call 1 attempt successful?  Yes   Call start time  1121   Call end time  1126   Discharge diagnosis  Hypoxia, PNA d/t COVID-19, A-fib RVR, Hx asthma,    Person spoke with today (if not patient) and relationship  patient   Comments regarding appointments  SCHEDULED FOR 09.27.2021   Does the patient have a primary care provider?   Yes   Does the patient have an appointment with their PCP or specialist within 7 days of discharge?  Yes   Has the patient kept scheduled appointments due by today?  N/A   Has home health visited the patient within 72 hours of discharge?  N/A   What DME was ordered?  O2 from [place], has home pulse ox   Has all DME been delivered?  Yes   DME comments  USING WALKER PROVIDED BY FAMILY   Psychosocial issues?  No   Did the patient receive a copy of their discharge instructions?  Yes   Did the patient receive a copy of COVID-19 specific instructions?  Yes   Nursing interventions  Reviewed instructions with patient   What is the patient's perception of their health status since discharge?  Improving   Does the patient have any of the following symptoms?  Shortness of breath [SOA WITH ACTIVITY, SLIGHT COUGH WITH INHALERS]   Nursing Interventions  Nurse provided patient education   Pulse Ox monitoring  Intermittent [ALSO USING IS SEVERAL TIMES DAILY, VOLUME 1000]   Is the patient/caregiver able to teach back steps to recovery at home?  Set small, achievable goals for return to baseline health, Rest and rebuild strength, gradually increase activity, Eat a well-balance diet, Practice good oral hygiene   If the patient is a current smoker, are they able to teach back resources for cessation?  Not a smoker   Is the patient/caregiver  able to teach back the hierarchy of who to call/visit for symptoms/problems? PCP, Specialist, Home health nurse, Urgent Care, ED, 911  Yes   COVID-19 call completed?  Yes   Wrap up additional comments  PATIENT AMBULATING WITH WALKER, VERY FATIGUED, STILL SOA WITH ACTIVITY, O2 AS PERSCRIBED, USING IS PULLING 1000 VOLUME, ENCOURAGED CDB EXERCISES. FOLLOW-UP SCHEDULED          Estephania Perez RN

## 2021-09-24 ENCOUNTER — TELEPHONE (OUTPATIENT)
Dept: CARDIOLOGY | Facility: CLINIC | Age: 56
End: 2021-09-24

## 2021-09-24 ENCOUNTER — READMISSION MANAGEMENT (OUTPATIENT)
Dept: CALL CENTER | Facility: HOSPITAL | Age: 56
End: 2021-09-24

## 2021-09-24 NOTE — OUTREACH NOTE
COVID-19 Week 3 Survey      Responses   [facility] patient discharged from?  [facility]   Does the patient have one of the following disease processes/diagnoses(primary or secondary)?  COVID-19   COVID-19 underlying condition?  None   Call Number  Call 1   COVID-19 Week 3: Call 1 attempt successful?  Yes [ states that they are on the way to see PCP at time of call. Denies any needs today and to call back another day. ]   Call start time  1222   Call end time  1223   Discharge diagnosis  Hypoxia, PNA d/t COVID-19, A-fib RVR, Hx asthma,    Is patient permission given to speak with other caregiver?  Yes   Person spoke with today (if not patient) and relationship  Lorenzo Brown, spouse          Ivonne Aggarwal RN

## 2021-10-01 ENCOUNTER — READMISSION MANAGEMENT (OUTPATIENT)
Dept: CALL CENTER | Facility: HOSPITAL | Age: 56
End: 2021-10-01

## 2021-10-01 NOTE — OUTREACH NOTE
COVID-19 Week 4 Survey      Responses   [facility] patient discharged from?  [facility]   Does the patient have one of the following disease processes/diagnoses(primary or secondary)?  COVID-19   COVID-19 underlying condition?  None   Call Number  Call 1   COVID-19 Week 4: Call 1 attempt successful?  Yes   Call start time  0818   Call end time  0834   Discharge diagnosis  Hypoxia, PNA d/t COVID-19, A-fib RVR, Hx asthma,    Meds reviewed with patient/caregiver?  Yes   Is the patient having any side effects they believe may be caused by any medication additions or changes?  No   Does the patient have all medications ordered at discharge?  Yes   Is the patient taking all medications as directed (includes completed medication regime)?  Yes   Medication comments  Steriods, Prednisone, called in for continued infiltrates. Repeat CXR in 2 weeks.   Has the patient kept scheduled appointments due by today?  Yes   Comments  09/24/2021 PCP   Is the patient still receiving Home Health Services?  N/A   DME comments  Continues to use walker, says she walks like wobbly toddler   What is the patient's perception of their health status since discharge?  Improving   Does the patient have any of the following symptoms?  Shortness of breath, Cough   Nursing Interventions  Nurse provided patient education   Pulse Ox monitoring  Intermittent   Pulse Ox device source  Patient   O2 Sat comments  Wears O2 @2l/min continious O2 sat 92-96% at rest. Sat drops with activity.  Reviewed when to seek care, verbalizes understanding.   O2 Sat: education provided  Sat levels, Monitoring frequency, When to seek care   O2 Sat education comments  If 90% or below and stays there, call 911.   Is the patient interested in additional calls from an ambulatory ?  NOTE:  applies to high risk patients requiring additional follow-up.  No   Did the patient feel the follow up calls were helpful during their recovery period?  Yes   Wrap up additional  comments  Educated on continued home care, follow up care and Nurse Call Center. Verbalizes understanding.          Evonne Kraft RN

## 2021-10-14 LAB
Lab: 39
TOAL ENROLLMENT DAYS: 30

## 2021-10-14 PROCEDURE — 93228 REMOTE 30 DAY ECG REV/REPORT: CPT | Performed by: INTERNAL MEDICINE

## 2021-10-21 ENCOUNTER — OFFICE VISIT (OUTPATIENT)
Dept: CARDIOLOGY | Facility: CLINIC | Age: 56
End: 2021-10-21

## 2021-10-21 VITALS
DIASTOLIC BLOOD PRESSURE: 83 MMHG | BODY MASS INDEX: 42.91 KG/M2 | HEIGHT: 66 IN | OXYGEN SATURATION: 97 % | WEIGHT: 267 LBS | HEART RATE: 100 BPM | SYSTOLIC BLOOD PRESSURE: 141 MMHG

## 2021-10-21 DIAGNOSIS — R06.02 SHORTNESS OF BREATH: Primary | ICD-10-CM

## 2021-10-21 DIAGNOSIS — I48.0 PAROXYSMAL ATRIAL FIBRILLATION (HCC): ICD-10-CM

## 2021-10-21 PROCEDURE — 99204 OFFICE O/P NEW MOD 45 MIN: CPT | Performed by: INTERNAL MEDICINE

## 2021-10-21 NOTE — PROGRESS NOTES
Encounter Date:10/21/2021      Patient ID: Danita Montiel is a 56 y.o. female.    Chief Complaint:  Atrial fibrillation with RVR  Hypertension  CKD 3  History of Covid 19      History of Present Illness  The patient recently was admitted to [facility] and was seen by Dr. Tobar. Patient is new to me. Patient has COVID-19 at that time in September and had atrial fibrillation with RVR. Patient was started on medications and was discharged home. Patient has not been taking metoprolol or Cardizem. Patient has been having palpitations with exertion. Also is on oxygen after she had Covid. Patient has shortness of breath. No other associated aggravating or elevating factors. Denies having any chest discomfort.    Patient is not having any chest discomfort dizziness or syncope.  Denies having any headache ,abdominal pain ,nausea, vomiting , diarrhea constipation, loss of weight or loss of appetite.  Denies having any excessive bruising ,hematuria or blood in the stool.    Review of all systems negative except as indicated.    Reviewed ROS.      Assessment and Plan     ]]]]]]]]]]]]]]]]  Impression  =====  -History of atrial fibrillation with RVR in the setting of COVID-19 infection pneumonia    -History of COVID-19 pneumonia.  Acute hypoxic respiratory failure-improved    -Hypertension CKD 3 dyslipidemia hypothyroidism GERD    -History of pulmonary embolus/DVT    -Anticoagulation-on Xarelto    -Family history of coronary artery disease    -Non-smoker    -Allergic to erythromycin  ===========  Plan  ==========  Patient recently had atrial fibrillation in the setting of COVID-19 pneumonia.  Patient has continued palpitations with exertion.  Patient has not been taking metoprolol or Cardizem.  Patient was started on Cardizem  mg a day.  Follow-up in the office in 2 to 3 weeks with an echocardiogram and EKG.  Patient is anticoagulated with Xarelto.  Patient is on home oxygen.  Further plan will depend on  patient's progress.  ]]]]]]]]]]]]]]]]]]]     Diagnosis Plan   1. Shortness of breath  Adult Transthoracic Echo Complete W/ Cont if Necessary Per Protocol   2. Paroxysmal atrial fibrillation (HCC)  Adult Transthoracic Echo Complete W/ Cont if Necessary Per Protocol   LAB RESULTS (LAST 7 DAYS)    CBC        BMP        CMP         BNP        TROPONIN        CoAg        Creatinine Clearance  CrCl cannot be calculated (Patient's most recent lab result is older than the maximum 30 days allowed.).    ABG        Radiology  No radiology results for the last day                The following portions of the patient's history were reviewed and updated as appropriate: allergies, current medications, past family history, past medical history, past social history, past surgical history and problem list.    Review of Systems   Constitutional: Negative for malaise/fatigue.   Cardiovascular: Negative for chest pain, leg swelling, palpitations and syncope.   Respiratory: Positive for shortness of breath.    Skin: Negative for rash.   Gastrointestinal: Negative for nausea and vomiting.   Neurological: Negative for dizziness, light-headedness and numbness.   All other systems reviewed and are negative.        Current Outpatient Medications:   •  Acetylcysteine capsule capsule, Take 1 capsule by mouth 2 (Two) Times a Day., Disp: 60 capsule, Rfl: 0  •  albuterol sulfate  (90 Base) MCG/ACT inhaler, Inhale 2 puffs Every 4 (Four) Hours As Needed for Wheezing., Disp: , Rfl:   •  ascorbic acid (VITAMIN C) 500 MG tablet, Take 1 tablet by mouth Daily., Disp: 30 tablet, Rfl: 0  •  benzonatate (TESSALON) 200 MG capsule, Take 1 capsule by mouth 3 (Three) Times a Day As Needed for Cough., Disp: 20 capsule, Rfl: 0  •  cholecalciferol (VITAMIN D3) 25 MCG (1000 UT) tablet, Take 1 tablet by mouth Daily., Disp: 30 tablet, Rfl: 0  •  dexamethasone (DECADRON) 1 MG tablet, Take 4 tablets by mouth daily for 4 days, then 3 tabs daily for 4 days, then 2  tabs daily for 4 days, then 1 tab daily for 4 days., Disp: 40 tablet, Rfl: 0  •  dilTIAZem CD (CARDIZEM CD) 120 MG 24 hr capsule, Take 1 capsule by mouth Daily., Disp: 30 capsule, Rfl: 1  •  docusate sodium 100 MG capsule, Take 1 capsule by mouth 2 (Two) Times a Day., Disp: 60 capsule, Rfl: 0  •  FLUoxetine (PROzac) 40 MG capsule, Take 40 mg by mouth Daily., Disp: , Rfl:   •  fluticasone-salmeterol (ADVAIR) 250-50 MCG/DOSE DISKUS, Inhale 1 puff 2 (two) times a day., Disp: , Rfl:   •  furosemide (Lasix) 20 MG tablet, Take 1 tablet by mouth Daily., Disp: 30 tablet, Rfl: 0  •  guaiFENesin (MUCINEX) 600 MG 12 hr tablet, Take 1 tablet by mouth Every 12 (Twelve) Hours., Disp: 60 tablet, Rfl: 0  •  levothyroxine (SYNTHROID, LEVOTHROID) 112 MCG tablet, Take 112 mcg by mouth Daily., Disp: , Rfl:   •  montelukast (SINGULAIR) 10 MG tablet, Take 10 mg by mouth Every Night., Disp: , Rfl:   •  omeprazole (priLOSEC) 40 MG capsule, Take 40 mg by mouth Daily., Disp: , Rfl:   •  ondansetron (ZOFRAN) 8 MG tablet, Take 8 mg by mouth 4 (Four) Times a Day As Needed for Nausea or Vomiting., Disp: , Rfl:   •  polyethylene glycol (MIRALAX) 17 GM/SCOOP powder, Take 17 g by mouth Daily., Disp: 238 g, Rfl: 0  •  rivaroxaban (XARELTO) 20 MG tablet, Take 1 tablet by mouth Daily With Dinner. Indications: DVT/PE (active thrombosis), Disp: 30 tablet, Rfl: 1  •  traZODone (DESYREL) 50 MG tablet, Take 50 mg by mouth Every Night., Disp: , Rfl:   •  zinc sulfate (ZINCATE) 220 (50 Zn) MG capsule, Take 1 capsule by mouth Daily., Disp: 30 capsule, Rfl: 0    Allergies   Allergen Reactions   • Clarithromycin Itching       Family History   Problem Relation Age of Onset   • Heart disease Paternal Grandmother    • Heart disease Paternal Grandfather    • Stroke Other        Past Surgical History:   Procedure Laterality Date   • DIAGNOSTIC LAPAROSCOPY     • OVARIAN CYST DRAINAGE         Past Medical History:   Diagnosis Date   • Anxiety    • Asthma    • CKD  "(chronic kidney disease) stage 3, GFR 30-59 ml/min ([facility])    • COVID-19 11/16/2020   • Endometriosis    • GERD (gastroesophageal reflux disease)    • Hypertension    • Hypothyroidism    • Obesity    • Ovarian cyst        Family History   Problem Relation Age of Onset   • Heart disease Paternal Grandmother    • Heart disease Paternal Grandfather    • Stroke Other        Social History     Socioeconomic History   • Marital status:    Tobacco Use   • Smoking status: Never Smoker   • Smokeless tobacco: Never Used   Vaping Use   • Vaping Use: Never used   Substance and Sexual Activity   • Alcohol use: Yes     Alcohol/week: 4.0 standard drinks     Types: 4 Standard drinks or equivalent per week     Comment: 1 shot of liquor nightly   • Drug use: Never   • Sexual activity: Defer         Procedures      Objective:       Physical Exam    /83 (BP Location: Right arm, Patient Position: Sitting, Cuff Size: Large Adult)   Pulse 100   Ht 167.6 cm (66\")   Wt 121 kg (267 lb)   LMP  (LMP Unknown)   SpO2 97%   BMI 43.09 kg/m²   The patient is alert, oriented and in no distress.    Vital signs as noted above. Exogenous obesity.    Head and neck revealed no carotid bruits or jugular venous distension.  No thyromegaly or lymphadenopathy is present.    Lungs clear.  No wheezing.  Breath sounds are normal bilaterally.    Heart normal first and second heart sounds.  No murmur..  No pericardial rub is present.  No gallop is present.    Abdomen soft and nontender.  No organomegaly is present.    Extremities revealed good peripheral pulses without any pedal edema.    Skin warm and dry.    Musculoskeletal system is grossly normal.    CNS grossly normal.    Reviewed and unchanged from last visit.        "

## 2021-10-25 ENCOUNTER — TELEPHONE (OUTPATIENT)
Dept: CARDIOLOGY | Facility: CLINIC | Age: 56
End: 2021-10-25

## 2021-10-25 RX ORDER — DILTIAZEM HYDROCHLORIDE 240 MG/1
240 CAPSULE, COATED, EXTENDED RELEASE ORAL DAILY
Qty: 30 CAPSULE | Refills: 5 | Status: SHIPPED | OUTPATIENT
Start: 2021-10-25 | End: 2023-04-04 | Stop reason: SDUPTHER

## 2021-10-25 NOTE — TELEPHONE ENCOUNTER
Patient was started on Cardizem  mg once daily. I tried to call this in to the pharmacy, but I was not able to get an answer, so I will send a new RX electronically.

## 2021-10-25 NOTE — TELEPHONE ENCOUNTER
RECEIVED AN RX ON THURDSDAY DURING OV, [facility] IN [place] WILL NOT FILL RX BECAUSE THERE WAS NO QUANTITY ON IT  p-[phone]

## 2021-10-25 NOTE — TELEPHONE ENCOUNTER
Incoming Refill Request      Medication requested (name and dose):SEE PHONE NOTE        Additional details provided by patient:     Best call back number:     Does the patient have less than a 3 day supply:  [] Yes  [] No    Martha Contreras Rep  10/25/21, 14:00 EDT

## 2021-11-11 ENCOUNTER — HOSPITAL ENCOUNTER (OUTPATIENT)
Dept: CARDIOLOGY | Facility: HOSPITAL | Age: 56
Discharge: HOME | End: 2021-11-11
Admitting: INTERNAL MEDICINE

## 2021-11-11 ENCOUNTER — OFFICE VISIT (OUTPATIENT)
Dept: CARDIOLOGY | Facility: CLINIC | Age: 56
End: 2021-11-11

## 2021-11-11 VITALS
WEIGHT: 237 LBS | HEIGHT: 66 IN | BODY MASS INDEX: 38.09 KG/M2 | SYSTOLIC BLOOD PRESSURE: 161 MMHG | DIASTOLIC BLOOD PRESSURE: 69 MMHG

## 2021-11-11 VITALS
HEIGHT: 66 IN | BODY MASS INDEX: 43.91 KG/M2 | HEART RATE: 87 BPM | OXYGEN SATURATION: 96 % | SYSTOLIC BLOOD PRESSURE: 156 MMHG | DIASTOLIC BLOOD PRESSURE: 76 MMHG | WEIGHT: 273.25 LBS

## 2021-11-11 DIAGNOSIS — E78.5 DYSLIPIDEMIA: ICD-10-CM

## 2021-11-11 DIAGNOSIS — I10 ESSENTIAL HYPERTENSION: ICD-10-CM

## 2021-11-11 DIAGNOSIS — R06.02 SHORTNESS OF BREATH: ICD-10-CM

## 2021-11-11 DIAGNOSIS — I48.0 PAROXYSMAL ATRIAL FIBRILLATION (HCC): ICD-10-CM

## 2021-11-11 DIAGNOSIS — I48.0 PAROXYSMAL ATRIAL FIBRILLATION (HCC): Primary | ICD-10-CM

## 2021-11-11 LAB
BH CV ECHO MEAS - ACS: 1.7 CM
BH CV ECHO MEAS - AO MAX PG (FULL): 9.4 MMHG
BH CV ECHO MEAS - AO MAX PG: 16.4 MMHG
BH CV ECHO MEAS - AO MEAN PG (FULL): 5.2 MMHG
BH CV ECHO MEAS - AO MEAN PG: 8.5 MMHG
BH CV ECHO MEAS - AO ROOT AREA (BSA CORRECTED): 1.2
BH CV ECHO MEAS - AO ROOT AREA: 5.8 CM^2
BH CV ECHO MEAS - AO ROOT DIAM: 2.7 CM
BH CV ECHO MEAS - AO V2 MAX: 202.6 CM/SEC
BH CV ECHO MEAS - AO V2 MEAN: 139.5 CM/SEC
BH CV ECHO MEAS - AO V2 VTI: 36 CM
BH CV ECHO MEAS - ASC AORTA: 2.6 CM
BH CV ECHO MEAS - AVA(I,A): 2 CM^2
BH CV ECHO MEAS - AVA(I,D): 2 CM^2
BH CV ECHO MEAS - AVA(V,A): 1.9 CM^2
BH CV ECHO MEAS - AVA(V,D): 1.9 CM^2
BH CV ECHO MEAS - BSA(HAYCOCK): 2.4 M^2
BH CV ECHO MEAS - BSA: 2.3 M^2
BH CV ECHO MEAS - BZI_BMI: 43.1 KILOGRAMS/M^2
BH CV ECHO MEAS - BZI_METRIC_HEIGHT: 167.6 CM
BH CV ECHO MEAS - BZI_METRIC_WEIGHT: 121.1 KG
BH CV ECHO MEAS - EDV(CUBED): 149.1 ML
BH CV ECHO MEAS - EDV(MOD-SP4): 83.3 ML
BH CV ECHO MEAS - EDV(TEICH): 135.5 ML
BH CV ECHO MEAS - EF(CUBED): 85.7 %
BH CV ECHO MEAS - EF(MOD-SP4): 62.9 %
BH CV ECHO MEAS - EF(TEICH): 78.7 %
BH CV ECHO MEAS - ESV(CUBED): 21.4 ML
BH CV ECHO MEAS - ESV(MOD-SP4): 30.9 ML
BH CV ECHO MEAS - ESV(TEICH): 28.9 ML
BH CV ECHO MEAS - FS: 47.7 %
BH CV ECHO MEAS - IVS/LVPW: 1.3
BH CV ECHO MEAS - IVSD: 1.3 CM
BH CV ECHO MEAS - LA DIMENSION: 4.6 CM
BH CV ECHO MEAS - LA/AO: 1.7
BH CV ECHO MEAS - LV DIASTOLIC VOL/BSA (35-75): 36.8 ML/M^2
BH CV ECHO MEAS - LV MASS(C)D: 243.8 GRAMS
BH CV ECHO MEAS - LV MASS(C)DI: 107.8 GRAMS/M^2
BH CV ECHO MEAS - LV MAX PG: 7 MMHG
BH CV ECHO MEAS - LV MEAN PG: 3.3 MMHG
BH CV ECHO MEAS - LV SYSTOLIC VOL/BSA (12-30): 13.7 ML/M^2
BH CV ECHO MEAS - LV V1 MAX: 132.1 CM/SEC
BH CV ECHO MEAS - LV V1 MEAN: 86 CM/SEC
BH CV ECHO MEAS - LV V1 VTI: 24.4 CM
BH CV ECHO MEAS - LVIDD: 5.3 CM
BH CV ECHO MEAS - LVIDS: 2.8 CM
BH CV ECHO MEAS - LVOT AREA: 2.9 CM^2
BH CV ECHO MEAS - LVOT DIAM: 1.9 CM
BH CV ECHO MEAS - LVPWD: 1 CM
BH CV ECHO MEAS - MV A MAX VEL: 101.9 CM/SEC
BH CV ECHO MEAS - MV DEC SLOPE: 451.7 CM/SEC^2
BH CV ECHO MEAS - MV DEC TIME: 0.21 SEC
BH CV ECHO MEAS - MV E MAX VEL: 96.4 CM/SEC
BH CV ECHO MEAS - MV E/A: 0.95
BH CV ECHO MEAS - MV MAX PG: 4.5 MMHG
BH CV ECHO MEAS - MV MEAN PG: 2.4 MMHG
BH CV ECHO MEAS - MV V2 MAX: 106.3 CM/SEC
BH CV ECHO MEAS - MV V2 MEAN: 73.9 CM/SEC
BH CV ECHO MEAS - MV V2 VTI: 27.4 CM
BH CV ECHO MEAS - MVA(VTI): 2.6 CM^2
BH CV ECHO MEAS - PA ACC TIME: 0.09 SEC
BH CV ECHO MEAS - PA PR(ACCEL): 37 MMHG
BH CV ECHO MEAS - RAP SYSTOLE: 3 MMHG
BH CV ECHO MEAS - RV MAX PG: 3 MMHG
BH CV ECHO MEAS - RV MEAN PG: 1.9 MMHG
BH CV ECHO MEAS - RV V1 MAX: 86.7 CM/SEC
BH CV ECHO MEAS - RV V1 MEAN: 66.4 CM/SEC
BH CV ECHO MEAS - RV V1 VTI: 17 CM
BH CV ECHO MEAS - RVDD: 3.4 CM
BH CV ECHO MEAS - RVSP: 20.3 MMHG
BH CV ECHO MEAS - SI(AO): 93 ML/M^2
BH CV ECHO MEAS - SI(CUBED): 56.5 ML/M^2
BH CV ECHO MEAS - SI(LVOT): 31.2 ML/M^2
BH CV ECHO MEAS - SI(MOD-SP4): 23.2 ML/M^2
BH CV ECHO MEAS - SI(TEICH): 47.1 ML/M^2
BH CV ECHO MEAS - SV(AO): 210.2 ML
BH CV ECHO MEAS - SV(CUBED): 127.7 ML
BH CV ECHO MEAS - SV(LVOT): 70.6 ML
BH CV ECHO MEAS - SV(MOD-SP4): 52.4 ML
BH CV ECHO MEAS - SV(TEICH): 106.6 ML
BH CV ECHO MEAS - TR MAX VEL: 207.8 CM/SEC
LV EF 2D ECHO EST: 60 %
MAXIMAL PREDICTED HEART RATE: 162 BPM
STRESS TARGET HR: 138 BPM

## 2021-11-11 PROCEDURE — 99214 OFFICE O/P EST MOD 30 MIN: CPT | Performed by: INTERNAL MEDICINE

## 2021-11-11 PROCEDURE — 93306 TTE W/DOPPLER COMPLETE: CPT | Performed by: INTERNAL MEDICINE

## 2021-11-11 PROCEDURE — 93000 ELECTROCARDIOGRAM COMPLETE: CPT | Performed by: INTERNAL MEDICINE

## 2021-11-11 PROCEDURE — 93306 TTE W/DOPPLER COMPLETE: CPT

## 2021-11-11 NOTE — PROGRESS NOTES
Encounter Date:11/11/2021  Last seen 10/21/2021      Patient ID: Danita Montiel is a 56 y.o. female.    Chief Complaint:  Atrial fibrillation with RVR  Hypertension  CKD 3  History of Covid 19        History of Present Illness  Since I have last seen, the patient has been without any chest discomfort ,shortness of breath, palpitations, dizziness or syncope.  Denies having any headache ,abdominal pain ,nausea, vomiting , diarrhea constipation, loss of weight or loss of appetite.  Denies having any excessive bruising ,hematuria or blood in the stool.    Review of all systems negative except as indicated.    Reviewed ROS.     Assessment and Plan      ]]]]]]]]]]]]]]]]  Impression  =====  -History of atrial fibrillation with RVR in the setting of COVID-19 infection pneumonia  Patient has converted to sinus rhythm.  EKG today 11/11/2021 showed sinus rhythm    Echocardiogram 11 June 2021   Left atrial enlargement.  Structurally and functionally normal cardiac valves.  Left ventricular size and contractility is normal with ejection fraction of 60%.  Concentric left ventricle hypertrophy.    -History of COVID-19 pneumonia.  Acute hypoxic respiratory failure-improved     -Hypertension CKD 3 dyslipidemia hypothyroidism GERD     -History of pulmonary embolus/DVT     -Anticoagulation-on Xarelto     -Family history of coronary artery disease     -Non-smoker     -Allergic to erythromycin  ===========  Plan  ==========  History of atrial fibrillation in the setting of COVID-19 pneumonia.  Patient has converted and is maintaining sinus rhythm.  EKG 11/11/2021 showed sinus rhythm.  Continue Cardizem  mg a day.    Anticoagulation status reviewed.  Continue Xarelto.    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  Follow-up in the office in 3 months with EKG.    Further plan will depend on patient's  progress.  ]]]]]]]]]]]]]]]]]]]               Diagnosis Plan   1. Paroxysmal atrial fibrillation (HCC)     2. Shortness of breath     LAB RESULTS (LAST 7 DAYS)    CBC        BMP        CMP         BNP        TROPONIN        CoAg        Creatinine Clearance  CrCl cannot be calculated (Patient's most recent lab result is older than the maximum 30 days allowed.).    ABG        Radiology  No radiology results for the last day                The following portions of the patient's history were reviewed and updated as appropriate: allergies, current medications, past family history, past medical history, past social history, past surgical history and problem list.    Review of Systems   Constitutional: Negative for fever and malaise/fatigue.   HENT: Negative for ear pain and nosebleeds.    Eyes: Negative for blurred vision and double vision.   Cardiovascular: Negative for chest pain, dyspnea on exertion and palpitations.   Respiratory: Positive for shortness of breath. Negative for cough.    Skin: Negative for rash.   Musculoskeletal: Negative for joint pain.   Gastrointestinal: Negative for abdominal pain, nausea and vomiting.   Neurological: Negative for focal weakness and headaches.   Psychiatric/Behavioral: Negative for depression. The patient is not nervous/anxious.    All other systems reviewed and are negative.        Current Outpatient Medications:   •  Acetylcysteine capsule capsule, Take 1 capsule by mouth 2 (Two) Times a Day., Disp: 60 capsule, Rfl: 0  •  albuterol sulfate  (90 Base) MCG/ACT inhaler, Inhale 2 puffs Every 4 (Four) Hours As Needed for Wheezing., Disp: , Rfl:   •  ascorbic acid (VITAMIN C) 500 MG tablet, Take 1 tablet by mouth Daily., Disp: 30 tablet, Rfl: 0  •  benzonatate (TESSALON) 200 MG capsule, Take 1 capsule by mouth 3 (Three) Times a Day As Needed for Cough., Disp: 20 capsule, Rfl: 0  •  cholecalciferol (VITAMIN D3) 25 MCG (1000 UT) tablet, Take 1 tablet by mouth Daily., Disp: 30  tablet, Rfl: 0  •  dexamethasone (DECADRON) 1 MG tablet, Take 4 tablets by mouth daily for 4 days, then 3 tabs daily for 4 days, then 2 tabs daily for 4 days, then 1 tab daily for 4 days., Disp: 40 tablet, Rfl: 0  •  dilTIAZem CD (CARDIZEM CD) 240 MG 24 hr capsule, Take 1 capsule by mouth Daily., Disp: 30 capsule, Rfl: 5  •  docusate sodium 100 MG capsule, Take 1 capsule by mouth 2 (Two) Times a Day., Disp: 60 capsule, Rfl: 0  •  FLUoxetine (PROzac) 40 MG capsule, Take 40 mg by mouth Daily., Disp: , Rfl:   •  fluticasone-salmeterol (ADVAIR) 250-50 MCG/DOSE DISKUS, Inhale 1 puff 2 (two) times a day., Disp: , Rfl:   •  furosemide (Lasix) 20 MG tablet, Take 1 tablet by mouth Daily., Disp: 30 tablet, Rfl: 0  •  guaiFENesin (MUCINEX) 600 MG 12 hr tablet, Take 1 tablet by mouth Every 12 (Twelve) Hours., Disp: 60 tablet, Rfl: 0  •  levothyroxine (SYNTHROID, LEVOTHROID) 112 MCG tablet, Take 112 mcg by mouth Daily., Disp: , Rfl:   •  montelukast (SINGULAIR) 10 MG tablet, Take 10 mg by mouth Every Night., Disp: , Rfl:   •  omeprazole (priLOSEC) 40 MG capsule, Take 40 mg by mouth Daily., Disp: , Rfl:   •  ondansetron (ZOFRAN) 8 MG tablet, Take 8 mg by mouth 4 (Four) Times a Day As Needed for Nausea or Vomiting., Disp: , Rfl:   •  polyethylene glycol (MIRALAX) 17 GM/SCOOP powder, Take 17 g by mouth Daily., Disp: 238 g, Rfl: 0  •  rivaroxaban (XARELTO) 20 MG tablet, Take 1 tablet by mouth Daily With Dinner. Indications: DVT/PE (active thrombosis), Disp: 30 tablet, Rfl: 1  •  traZODone (DESYREL) 50 MG tablet, Take 50 mg by mouth Every Night., Disp: , Rfl:   •  zinc sulfate (ZINCATE) 220 (50 Zn) MG capsule, Take 1 capsule by mouth Daily., Disp: 30 capsule, Rfl: 0    Allergies   Allergen Reactions   • Clarithromycin Itching       Family History   Problem Relation Age of Onset   • Heart disease Paternal Grandmother    • Heart disease Paternal Grandfather    • Stroke Other        Past Surgical History:   Procedure Laterality Date   •  "DIAGNOSTIC LAPAROSCOPY     • OVARIAN CYST DRAINAGE         Past Medical History:   Diagnosis Date   • Anxiety    • Asthma    • CKD (chronic kidney disease) stage 3, GFR 30-59 ml/min ([facility])    • COVID-19 11/16/2020   • Endometriosis    • GERD (gastroesophageal reflux disease)    • Hypertension    • Hypothyroidism    • Obesity    • Ovarian cyst        Family History   Problem Relation Age of Onset   • Heart disease Paternal Grandmother    • Heart disease Paternal Grandfather    • Stroke Other        Social History     Socioeconomic History   • Marital status:    Tobacco Use   • Smoking status: Never Smoker   • Smokeless tobacco: Never Used   Vaping Use   • Vaping Use: Never used   Substance and Sexual Activity   • Alcohol use: Yes     Alcohol/week: 4.0 standard drinks     Types: 4 Standard drinks or equivalent per week     Comment: 1 shot of liquor nightly   • Drug use: Never   • Sexual activity: Defer           ECG 12 Lead    Date/Time: 11/11/2021 9:48 AM  Performed by: Josee Martino MD  Authorized by: Josee Martino MD   Comparison: compared with previous ECG   Comparison to previous ECG: Normal sinus rhythm normal ECG 80/min normal axis normal intervals no ectopy change from 8/30/2021.  Patient was in atrial fibrillation.                Objective:       Physical Exam    /76   Pulse 87   Ht 167.6 cm (66\")   Wt 124 kg (273 lb 4 oz)   LMP  (LMP Unknown)   SpO2 96%   BMI 44.10 kg/m²   The patient is alert, oriented and in no distress.    Vital signs as noted above.    Head and neck revealed no carotid bruits or jugular venous distension.  No thyromegaly or lymphadenopathy is present.    Lungs clear.  No wheezing.  Breath sounds are normal bilaterally.    Heart normal first and second heart sounds.  No murmur..  No pericardial rub is present.  No gallop is present.    Abdomen soft and nontender.  No organomegaly is present.    Extremities revealed good peripheral pulses without any pedal " edema.    Skin warm and dry.    Musculoskeletal system is grossly normal.    CNS grossly normal.    Reviewed and unchanged from last visit.

## 2022-02-17 ENCOUNTER — OFFICE VISIT (OUTPATIENT)
Dept: CARDIOLOGY | Facility: CLINIC | Age: 57
End: 2022-02-17

## 2022-02-17 VITALS
DIASTOLIC BLOOD PRESSURE: 88 MMHG | WEIGHT: 273 LBS | HEART RATE: 81 BPM | HEIGHT: 66 IN | BODY MASS INDEX: 43.87 KG/M2 | OXYGEN SATURATION: 95 % | SYSTOLIC BLOOD PRESSURE: 147 MMHG

## 2022-02-17 DIAGNOSIS — Z79.01 CHRONIC ANTICOAGULATION: ICD-10-CM

## 2022-02-17 DIAGNOSIS — E78.5 DYSLIPIDEMIA: ICD-10-CM

## 2022-02-17 DIAGNOSIS — I10 ESSENTIAL HYPERTENSION: ICD-10-CM

## 2022-02-17 DIAGNOSIS — R06.02 SHORTNESS OF BREATH: ICD-10-CM

## 2022-02-17 DIAGNOSIS — I48.0 PAROXYSMAL ATRIAL FIBRILLATION: Primary | ICD-10-CM

## 2022-02-17 PROCEDURE — 99214 OFFICE O/P EST MOD 30 MIN: CPT | Performed by: INTERNAL MEDICINE

## 2022-02-17 PROCEDURE — 93000 ELECTROCARDIOGRAM COMPLETE: CPT | Performed by: INTERNAL MEDICINE

## 2022-02-17 NOTE — PROGRESS NOTES
Encounter Date:02/17/2022  Last seen 11/11/2021      Patient ID: Danita Montiel is a 56 y.o. female.    Chief Complaint:  Atrial fibrillation with RVR  Hypertension  CKD 3  History of Covid 19  Anticoagulation        History of Present Illness  Patient is having off-and-on palpitations.  Since I have last seen, the patient has been without any chest discomfort ,shortness of breath,, dizziness or syncope.  Denies having any headache ,abdominal pain ,nausea, vomiting , diarrhea constipation, loss of weight or loss of appetite.  Denies having any excessive bruising ,hematuria or blood in the stool.    Review of all systems negative except as indicated.    Reviewed ROS.   Assessment and Plan      ]]]]]]]]]]]]]]]]  Impression  =====  -History of atrial fibrillation with RVR in the setting of COVID-19 infection pneumonia  Patient has converted to sinus rhythm.  EKG today 11/11/2021 showed sinus rhythm     Echocardiogram 11 June 2021   Left atrial enlargement.  Structurally and functionally normal cardiac valves.  Left ventricular size and contractility is normal with ejection fraction of 60%.  Concentric left ventricle hypertrophy.     -History of COVID-19 pneumonia.  Acute hypoxic respiratory failure-improved     -Hypertension CKD 3 dyslipidemia hypothyroidism GERD     -History of pulmonary embolus/DVT     -Anticoagulation-on Xarelto     -Family history of coronary artery disease     -Non-smoker     -Allergic to erythromycin  ===========  Plan  ==========  History of atrial fibrillation in the setting of COVID-19 pneumonia.  Patient has converted and is maintaining sinus rhythm.  Patient is having off-and-on palpitations  Continue Cardizem  mg a day.  Started metoprolol XL 25 mg a day.    Anticoagulation status reviewed.  Continue Xarelto.     Hypertension-147/88.  Patient is on Cardizem and valsartan hydrochlorothiazide.  Started on metoprolol.    Hypothyroidism-on levothyroxine     Medications were reviewed and  updated.  Follow-up in the office in 3 months with EKG.     Further plan will depend on patient's progress.  ]]]]]]]]]]]]]]]]]]]                           Diagnosis Plan   1. Paroxysmal atrial fibrillation (HCC)     2. Essential hypertension     3. Dyslipidemia     4. Shortness of breath     5. Chronic anticoagulation     LAB RESULTS (LAST 7 DAYS)    CBC        BMP        CMP         BNP        TROPONIN        CoAg        Creatinine Clearance  CrCl cannot be calculated (Patient's most recent lab result is older than the maximum 30 days allowed.).    ABG        Radiology  No radiology results for the last day                The following portions of the patient's history were reviewed and updated as appropriate: allergies, current medications, past family history, past medical history, past social history, past surgical history and problem list.    Review of Systems   Constitutional: Negative for malaise/fatigue.   Cardiovascular: Positive for palpitations. Negative for chest pain, leg swelling and syncope.   Respiratory: Negative for shortness of breath.    Skin: Negative for rash.   Gastrointestinal: Negative for nausea and vomiting.   Neurological: Negative for dizziness, light-headedness and numbness.   All other systems reviewed and are negative.        Current Outpatient Medications:   •  Acetylcysteine capsule capsule, Take 1 capsule by mouth 2 (Two) Times a Day., Disp: 60 capsule, Rfl: 0  •  albuterol sulfate  (90 Base) MCG/ACT inhaler, Inhale 2 puffs Every 4 (Four) Hours As Needed for Wheezing., Disp: , Rfl:   •  ascorbic acid (VITAMIN C) 500 MG tablet, Take 1 tablet by mouth Daily., Disp: 30 tablet, Rfl: 0  •  cholecalciferol (VITAMIN D3) 25 MCG (1000 UT) tablet, Take 1 tablet by mouth Daily., Disp: 30 tablet, Rfl: 0  •  dexamethasone (DECADRON) 1 MG tablet, Take 4 tablets by mouth daily for 4 days, then 3 tabs daily for 4 days, then 2 tabs daily for 4 days, then 1 tab daily for 4 days., Disp: 40  tablet, Rfl: 0  •  dilTIAZem CD (CARDIZEM CD) 240 MG 24 hr capsule, Take 1 capsule by mouth Daily., Disp: 30 capsule, Rfl: 5  •  docusate sodium 100 MG capsule, Take 1 capsule by mouth 2 (Two) Times a Day., Disp: 60 capsule, Rfl: 0  •  FLUoxetine (PROzac) 40 MG capsule, Take 40 mg by mouth Daily., Disp: , Rfl:   •  fluticasone-salmeterol (ADVAIR) 250-50 MCG/DOSE DISKUS, Inhale 1 puff 2 (two) times a day., Disp: , Rfl:   •  furosemide (Lasix) 20 MG tablet, Take 1 tablet by mouth Daily., Disp: 30 tablet, Rfl: 0  •  guaiFENesin (MUCINEX) 600 MG 12 hr tablet, Take 1 tablet by mouth Every 12 (Twelve) Hours., Disp: 60 tablet, Rfl: 0  •  levothyroxine (SYNTHROID, LEVOTHROID) 112 MCG tablet, Take 112 mcg by mouth Daily., Disp: , Rfl:   •  montelukast (SINGULAIR) 10 MG tablet, Take 10 mg by mouth Every Night., Disp: , Rfl:   •  omeprazole (priLOSEC) 40 MG capsule, Take 40 mg by mouth Daily., Disp: , Rfl:   •  ondansetron (ZOFRAN) 8 MG tablet, Take 8 mg by mouth 4 (Four) Times a Day As Needed for Nausea or Vomiting., Disp: , Rfl:   •  polyethylene glycol (MIRALAX) 17 GM/SCOOP powder, Take 17 g by mouth Daily., Disp: 238 g, Rfl: 0  •  rivaroxaban (XARELTO) 20 MG tablet, Take 1 tablet by mouth Daily With Dinner. Indications: DVT/PE (active thrombosis), Disp: 30 tablet, Rfl: 1  •  traZODone (DESYREL) 50 MG tablet, Take 50 mg by mouth Every Night., Disp: , Rfl:   •  zinc sulfate (ZINCATE) 220 (50 Zn) MG capsule, Take 1 capsule by mouth Daily., Disp: 30 capsule, Rfl: 0    Allergies   Allergen Reactions   • Clarithromycin Itching       Family History   Problem Relation Age of Onset   • Heart disease Paternal Grandmother    • Heart disease Paternal Grandfather    • Stroke Other        Past Surgical History:   Procedure Laterality Date   • DIAGNOSTIC LAPAROSCOPY     • OVARIAN CYST DRAINAGE         Past Medical History:   Diagnosis Date   • Anxiety    • Asthma    • CKD (chronic kidney disease) stage 3, GFR 30-59 ml/min ([facility])    •  "COVID-19 11/16/2020   • Endometriosis    • GERD (gastroesophageal reflux disease)    • Hypertension    • Hypothyroidism    • Obesity    • Ovarian cyst        Family History   Problem Relation Age of Onset   • Heart disease Paternal Grandmother    • Heart disease Paternal Grandfather    • Stroke Other        Social History     Socioeconomic History   • Marital status:    Tobacco Use   • Smoking status: Never Smoker   • Smokeless tobacco: Never Used   Vaping Use   • Vaping Use: Never used   Substance and Sexual Activity   • Alcohol use: Yes     Alcohol/week: 4.0 standard drinks     Types: 4 Standard drinks or equivalent per week     Comment: 1 shot of liquor nightly   • Drug use: Never   • Sexual activity: Defer           ECG 12 Lead    Date/Time: 2/17/2022 10:05 AM  Performed by: Josee Martino MD  Authorized by: Josee Martino MD   Comparison: compared with previous ECG   Similar to previous ECG  Comparison to previous ECG: Normal sinus rhythm premature atrial contractions nonspecific ST-T wave changes normal axis normal intervals no significant change from 3/11/2021                Objective:       Physical Exam    /88 (BP Location: Left arm, Patient Position: Sitting, Cuff Size: Large Adult)   Pulse 81   Ht 167.6 cm (66\")   Wt 124 kg (273 lb)   LMP  (LMP Unknown)   SpO2 95%   BMI 44.06 kg/m²   The patient is alert, oriented and in no distress.    Vital signs as noted above.    Head and neck revealed no carotid bruits or jugular venous distension.  No thyromegaly or lymphadenopathy is present.    Lungs clear.  No wheezing.  Breath sounds are normal bilaterally.    Heart normal first and second heart sounds.  No murmur..  No pericardial rub is present.  No gallop is present.    Abdomen soft and nontender.  No organomegaly is present.    Extremities revealed good peripheral pulses without any pedal edema.    Skin warm and dry.    Musculoskeletal system is grossly normal.    CNS grossly " normal.    Reviewed and unchanged from last visit.

## 2022-07-07 RX ORDER — DILTIAZEM HYDROCHLORIDE 240 MG/1
CAPSULE, EXTENDED RELEASE ORAL
Qty: 30 CAPSULE | Refills: 5 | Status: SHIPPED | OUTPATIENT
Start: 2022-07-07

## 2022-07-07 NOTE — TELEPHONE ENCOUNTER
Rx Refill Note  Requested Prescriptions     Pending Prescriptions Disp Refills   • dilTIAZem (TIAZAC) 240 MG 24 hr capsule [Pharmacy Med Name: DILTIAZEM 24HR  MG CAP] 30 capsule 5     Sig: TAKE ONE CAPSULE BY MOUTH DAILY      Last office visit with prescribing clinician: 2/17/2022      Next office visit with prescribing clinician: Visit date not found            DELIA PIZARRO MA  07/07/22, 16:03 EDT

## 2023-03-27 RX ORDER — DILTIAZEM HYDROCHLORIDE 240 MG/1
CAPSULE, EXTENDED RELEASE ORAL
Qty: 30 CAPSULE | Refills: 5 | OUTPATIENT
Start: 2023-03-27

## 2023-03-27 NOTE — TELEPHONE ENCOUNTER
Calcium-Channel Blockers Protocol Failed 03/26/2023 10:04 PM     Normal serum potassium in past 12 months    BP under 140/90 in past year    GFR> 30 ml/min in past 12 months    Recent or future visit with authorizing provider             Rx Refill Note  Requested Prescriptions     Pending Prescriptions Disp Refills   • dilTIAZem (TIAZAC) 240 MG 24 hr capsule [Pharmacy Med Name: dilTIAZem 24HR  MG CAP] 30 capsule 5     Sig: TAKE ONE CAPSULE BY MOUTH DAILY      Last office visit with prescribing clinician: 2/17/2022   Last telemedicine visit with prescribing clinician: Visit date not found   Next office visit with prescribing clinician: Visit date not found                         Would you like a call back once the refill request has been completed: [] Yes [] No    If the office needs to give you a call back, can they leave a voicemail: [] Yes [] No    Karen Cheung MA  03/27/23, 08:24 EDT

## 2023-04-04 ENCOUNTER — TELEPHONE (OUTPATIENT)
Dept: CARDIOLOGY | Facility: CLINIC | Age: 58
End: 2023-04-04
Payer: COMMERCIAL

## 2023-04-04 RX ORDER — DILTIAZEM HYDROCHLORIDE 240 MG/1
240 CAPSULE, COATED, EXTENDED RELEASE ORAL DAILY
Qty: 30 CAPSULE | Refills: 5 | Status: SHIPPED | OUTPATIENT
Start: 2023-04-04

## 2023-04-04 NOTE — TELEPHONE ENCOUNTER
Rx Refill Note  Requested Prescriptions     Pending Prescriptions Disp Refills   • dilTIAZem CD (CARDIZEM CD) 240 MG 24 hr capsule 30 capsule 5     Sig: Take 1 capsule by mouth Daily.      Last office visit with prescribing clinician: 2/17/2022   Last telemedicine visit with prescribing clinician: 5/8/2023   Next office visit with prescribing clinician: 5/8/2023                         Would you like a call back once the refill request has been completed: [] Yes [] No    If the office needs to give you a call back, can they leave a voicemail: [] Yes [] No    Marjorie David MA  04/04/23, 13:25 EDT

## 2023-04-04 NOTE — TELEPHONE ENCOUNTER
Incoming Refill Request      Medication requested (name and dose): DILTIAZEM 240    Pharmacy where request should be sent: [facility]    Additional details provided by patient: [address]. SHE IS OUT OF MEDICATION.    Best call back number: [phone]  Does the patient have less than a 3 day supply:  [x] Yes  [] No    Martha Reece Rep  04/04/23, 12:43 EDT

## 2023-04-07 ENCOUNTER — TRANSCRIBE ORDERS (OUTPATIENT)
Dept: ADMINISTRATIVE | Facility: HOSPITAL | Age: 58
End: 2023-04-07
Payer: COMMERCIAL

## 2023-04-07 DIAGNOSIS — N17.9 ACUTE KIDNEY INJURY: Primary | ICD-10-CM

## 2023-04-14 ENCOUNTER — HOSPITAL ENCOUNTER (OUTPATIENT)
Dept: ULTRASOUND IMAGING | Facility: HOSPITAL | Age: 58
Discharge: HOME | End: 2023-04-14
Admitting: INTERNAL MEDICINE
Payer: COMMERCIAL

## 2023-04-14 DIAGNOSIS — N17.9 ACUTE KIDNEY INJURY: ICD-10-CM

## 2023-04-14 PROCEDURE — 76775 US EXAM ABDO BACK WALL LIM: CPT

## 2023-05-05 NOTE — PROGRESS NOTES
Encounter Date:05/08/2023  Last seen 2/17/2022      Patient ID: Danita Montiel is a 58 y.o. female.    Chief Complaint:    Atrial fibrillation with RVR  Hypertension  CKD 3  History of Covid 19  Anticoagulation        History of Present Illness    Patient is having off-and-on palpitations.    Since I have last seen, the patient has been without any chest discomfort ,shortness of breath,, dizziness or syncope.  Denies having any headache ,abdominal pain ,nausea, vomiting , diarrhea constipation, loss of weight or loss of appetite.  Denies having any excessive bruising ,hematuria or blood in the stool.    Review of all systems negative except as indicated.    Reviewed ROS.    Assessment and Plan      ]]]]]]]]]]]]]]]]  Impression  =====  -History of atrial fibrillation with RVR in the setting of COVID-19 infection pneumonia  Patient has converted to sinus rhythm.  EKG-sinus rhythm-5/8/2023     Echocardiogram 11 June 2021   Left atrial enlargement.  Structurally and functionally normal cardiac valves.  Left ventricular size and contractility is normal with ejection fraction of 60%.  Concentric left ventricle hypertrophy.     -History of COVID-19 pneumonia.  Acute hypoxic respiratory failure-improved     -Hypertension CKD 3 dyslipidemia hypothyroidism GERD     -History of pulmonary embolus/DVT     -Anticoagulation-on Xarelto     -Family history of coronary artery disease     -Non-smoker     -Allergic to erythromycin  ===========  Plan  ==========  History of atrial fibrillation in the setting of COVID-19 pneumonia.  Patient has converted and is maintaining sinus rhythm.  Patient is having off-and-on palpitations  Continue Cardizem  mg a day.  EKG 5/8/2023-sinus rhythm  Increase metoprolol XL 25 mg p.o. twice daily.     Anticoagulation status reviewed.  Continue Xarelto.     Hypertension- 164/83  Patient has not taken her medications yet today.  Patient is on Cardizem and valsartan hydrochlorothiazide.  Increase  metoprolol XL to 25 mg twice daily.    Hypothyroidism-on levothyroxine     Medications were reviewed and updated.    Follow-up in the office in 6 months with EKG.     Further plan will depend on patient's progress.  ]]]]]]]]]]]]]]]]]]]                              Diagnosis Plan   1. Paroxysmal atrial fibrillation        2. Essential hypertension        3. Dyslipidemia        4. Chronic anticoagulation        5. Shortness of breath        LAB RESULTS (LAST 7 DAYS)    CBC        BMP        CMP         BNP        TROPONIN        CoAg        Creatinine Clearance  CrCl cannot be calculated (Patient's most recent lab result is older than the maximum 30 days allowed.).    ABG        Radiology  No radiology results for the last day                The following portions of the patient's history were reviewed and updated as appropriate: allergies, current medications, past family history, past medical history, past social history, past surgical history and problem list.    Review of Systems   Constitutional: Positive for malaise/fatigue.   Cardiovascular: Positive for palpitations. Negative for chest pain, leg swelling and syncope.   Respiratory: Positive for shortness of breath.    Skin: Negative for rash.   Gastrointestinal: Negative for nausea and vomiting.   Neurological: Negative for dizziness, light-headedness and numbness.   All other systems reviewed and are negative.        Current Outpatient Medications:   •  Acetylcysteine capsule capsule, Take 1 capsule by mouth 2 (Two) Times a Day., Disp: 60 capsule, Rfl: 0  •  albuterol sulfate  (90 Base) MCG/ACT inhaler, Inhale 2 puffs Every 4 (Four) Hours As Needed for Wheezing., Disp: , Rfl:   •  ascorbic acid (VITAMIN C) 500 MG tablet, Take 1 tablet by mouth Daily., Disp: 30 tablet, Rfl: 0  •  cholecalciferol (VITAMIN D3) 25 MCG (1000 UT) tablet, Take 1 tablet by mouth Daily., Disp: 30 tablet, Rfl: 0  •  dexamethasone (DECADRON) 1 MG tablet, Take 4 tablets by mouth  daily for 4 days, then 3 tabs daily for 4 days, then 2 tabs daily for 4 days, then 1 tab daily for 4 days., Disp: 40 tablet, Rfl: 0  •  dilTIAZem CD (CARDIZEM CD) 240 MG 24 hr capsule, Take 1 capsule by mouth Daily., Disp: 30 capsule, Rfl: 5  •  docusate sodium 100 MG capsule, Take 1 capsule by mouth 2 (Two) Times a Day., Disp: 60 capsule, Rfl: 0  •  FLUoxetine (PROzac) 40 MG capsule, Take 1 capsule by mouth Daily., Disp: , Rfl:   •  fluticasone-salmeterol (ADVAIR) 250-50 MCG/DOSE DISKUS, Inhale 1 puff 2 (two) times a day., Disp: , Rfl:   •  furosemide (Lasix) 20 MG tablet, Take 1 tablet by mouth Daily., Disp: 30 tablet, Rfl: 0  •  guaiFENesin (MUCINEX) 600 MG 12 hr tablet, Take 1 tablet by mouth Every 12 (Twelve) Hours., Disp: 60 tablet, Rfl: 0  •  levothyroxine (SYNTHROID, LEVOTHROID) 112 MCG tablet, Take 1 tablet by mouth Daily., Disp: , Rfl:   •  montelukast (SINGULAIR) 10 MG tablet, Take 1 tablet by mouth Every Night., Disp: , Rfl:   •  omeprazole (priLOSEC) 40 MG capsule, Take 1 capsule by mouth Daily., Disp: , Rfl:   •  ondansetron (ZOFRAN) 8 MG tablet, Take 1 tablet by mouth 4 (Four) Times a Day As Needed for Nausea or Vomiting., Disp: , Rfl:   •  polyethylene glycol (MIRALAX) 17 GM/SCOOP powder, Take 17 g by mouth Daily., Disp: 238 g, Rfl: 0  •  rivaroxaban (XARELTO) 20 MG tablet, Take 1 tablet by mouth Daily With Dinner. Indications: DVT/PE (active thrombosis), Disp: 30 tablet, Rfl: 1  •  traZODone (DESYREL) 50 MG tablet, Take 1 tablet by mouth Every Night., Disp: , Rfl:   •  zinc sulfate (ZINCATE) 220 (50 Zn) MG capsule, Take 1 capsule by mouth Daily., Disp: 30 capsule, Rfl: 0    Allergies   Allergen Reactions   • Clarithromycin Itching       Family History   Problem Relation Age of Onset   • Heart disease Paternal Grandmother    • Heart disease Paternal Grandfather    • Stroke Other        Past Surgical History:   Procedure Laterality Date   • DIAGNOSTIC LAPAROSCOPY     • OVARIAN CYST DRAINAGE    "      Past Medical History:   Diagnosis Date   • Anxiety    • Asthma    • CKD (chronic kidney disease) stage 3, GFR 30-59 ml/min    • COVID-19 11/16/2020   • Endometriosis    • GERD (gastroesophageal reflux disease)    • Hypertension    • Hypothyroidism    • Obesity    • Ovarian cyst        Family History   Problem Relation Age of Onset   • Heart disease Paternal Grandmother    • Heart disease Paternal Grandfather    • Stroke Other        Social History     Socioeconomic History   • Marital status:    Tobacco Use   • Smoking status: Never   • Smokeless tobacco: Never   Vaping Use   • Vaping Use: Never used   Substance and Sexual Activity   • Alcohol use: Yes     Alcohol/week: 4.0 standard drinks     Types: 4 Standard drinks or equivalent per week     Comment: 1 shot of liquor nightly   • Drug use: Never   • Sexual activity: Defer           ECG 12 Lead    Date/Time: 5/8/2023 11:13 AM  Performed by: Josee Martino MD  Authorized by: Josee Martino MD   Comparison: compared with previous ECG   Similar to previous ECG  Comparison to previous ECG: Normal sinus rhythm nonspecific ST-T wave changes 69/min normal axis normal axis.  No significant change from 2/17/2022                Objective:       Physical Exam    /83 (BP Location: Left arm, Patient Position: Sitting, Cuff Size: Large Adult) Comment: PT STATES SHE HAS NOT TAKEN HER BP MEDS YET TODAY  Pulse 69   Ht 167.6 cm (66\")   Wt 115 kg (254 lb)   LMP  (LMP Unknown)   SpO2 95% Comment: RA  BMI 41.00 kg/m²   The patient is alert, oriented and in no distress.    Vital signs as noted above.  Exogenous obesity (BMI 41)    Head and neck revealed no carotid bruits or jugular venous distension.  No thyromegaly or lymphadenopathy is present.    Lungs clear.  No wheezing.  Breath sounds are normal bilaterally.    Heart normal first and second heart sounds.  No murmur..  No pericardial rub is present.  No gallop is present.    Abdomen soft and " nontender.  No organomegaly is present.    Extremities revealed good peripheral pulses without any pedal edema.    Skin warm and dry.    Musculoskeletal system is grossly normal.    CNS grossly normal.    Reviewed and updated.

## 2023-05-08 ENCOUNTER — OFFICE VISIT (OUTPATIENT)
Dept: CARDIOLOGY | Facility: CLINIC | Age: 58
End: 2023-05-08
Payer: COMMERCIAL

## 2023-05-08 VITALS
BODY MASS INDEX: 40.82 KG/M2 | HEART RATE: 69 BPM | HEIGHT: 66 IN | SYSTOLIC BLOOD PRESSURE: 164 MMHG | DIASTOLIC BLOOD PRESSURE: 83 MMHG | OXYGEN SATURATION: 95 % | WEIGHT: 254 LBS

## 2023-05-08 DIAGNOSIS — I48.0 PAROXYSMAL ATRIAL FIBRILLATION: Primary | ICD-10-CM

## 2023-05-08 DIAGNOSIS — Z79.01 CHRONIC ANTICOAGULATION: ICD-10-CM

## 2023-05-08 DIAGNOSIS — E78.5 DYSLIPIDEMIA: ICD-10-CM

## 2023-05-08 DIAGNOSIS — R06.02 SHORTNESS OF BREATH: ICD-10-CM

## 2023-05-08 DIAGNOSIS — I10 ESSENTIAL HYPERTENSION: ICD-10-CM

## 2023-10-09 RX ORDER — DILTIAZEM HYDROCHLORIDE 240 MG/1
240 CAPSULE, COATED, EXTENDED RELEASE ORAL DAILY
Qty: 30 CAPSULE | Refills: 5 | Status: SHIPPED | OUTPATIENT
Start: 2023-10-09

## 2023-10-09 NOTE — TELEPHONE ENCOUNTER
Rx Refill Note  Requested Prescriptions     Pending Prescriptions Disp Refills    dilTIAZem CD (CARDIZEM CD) 240 MG 24 hr capsule [Pharmacy Med Name: dilTIAZem 24H ER (CD) 240 MG CP] 30 capsule 5     Sig: TAKE ONE CAPSULE BY MOUTH DAILY      Last office visit with prescribing clinician: 5/8/2023   Last telemedicine visit with prescribing clinician: Visit date not found   Next office visit with prescribing clinician: 11/13/2023                         Would you like a call back once the refill request has been completed: [] Yes [] No    If the office needs to give you a call back, can they leave a voicemail: [] Yes [] No    Marjorie David MA  10/09/23, 09:44 EDT

## 2023-10-22 NOTE — PROGRESS NOTES
Encounter Date:11/13/2023    Last seen-5/8/2023      Patient ID: Danita Montiel is a 58 y.o. female.    Chief Complaint:     Atrial fibrillation with RVR  Hypertension  CKD 3  History of Covid 19  Anticoagulation     History of Present Illness     Since I have last seen, the patient has been without any chest discomfort ,shortness of breath, palpitations, dizziness or syncope.  Denies having any headache ,abdominal pain ,nausea, vomiting , diarrhea constipation, loss of weight or loss of appetite.  Denies having any excessive bruising ,hematuria or blood in the stool.    Review of all systems negative except as indicated.    Reviewed ROS.    Assessment and Plan      ]]]]]]]]]]]]]]]]  History  =====  -History of atrial fibrillation with RVR in the setting of COVID-19 infection pneumonia  Patient has converted to sinus rhythm.  EKG-sinus rhythm-5/8/2023     Echocardiogram 11 June 2021   Left atrial enlargement.  Structurally and functionally normal cardiac valves.  Left ventricular size and contractility is normal with ejection fraction of 60%.  Concentric left ventricle hypertrophy.     -History of COVID-19 pneumonia.  Acute hypoxic respiratory failure-improved     -Hypertension CKD 3 dyslipidemia hypothyroidism GERD     -History of pulmonary embolus/DVT     -Anticoagulation-on Xarelto     -Family history of coronary artery disease     -Non-smoker     -Allergic to erythromycin  ===========  Plan  ==========  History of atrial fibrillation in the setting of COVID-19 pneumonia.  Patient has converted and is maintaining sinus rhythm.    Continue Cardizem  mg a day.  EKG 5/8/2023-sinus rhythm  Continue metoprolol XL 25 mg p.o. twice daily.  EKG 11/13/2023-sinus rhythm     Anticoagulation status reviewed.  Continue Xarelto.     Hypertension-175/94  Patient has just taking her medications.  Patient is on Cardizem and valsartan hydrochlorothiazide.  Continue metoprolol XL to 25 mg twice daily.     Hypothyroidism-on  levothyroxine     Medications were reviewed and updated.     Follow-up in the office in 6 months with EKG.     Further plan will depend on patient's progress.    Reviewed and updated-11/13/2023  ]]]]]]]]]]]]]]]]]]]              Diagnosis Plan   1. Paroxysmal atrial fibrillation        2. Essential hypertension        3. Dyslipidemia        4. Chronic anticoagulation        5. Shortness of breath        LAB RESULTS (LAST 7 DAYS)    CBC        BMP        CMP         BNP        TROPONIN        CoAg        Creatinine Clearance  CrCl cannot be calculated (Patient's most recent lab result is older than the maximum 30 days allowed.).    ABG        Radiology  No radiology results for the last day                The following portions of the patient's history were reviewed and updated as appropriate: allergies, current medications, past family history, past medical history, past social history, past surgical history, and problem list.    Review of Systems   Constitutional: Negative for malaise/fatigue.   Cardiovascular:  Positive for palpitations. Negative for chest pain, leg swelling and syncope.   Respiratory:  Positive for shortness of breath.    Skin:  Negative for rash.   Gastrointestinal:  Negative for nausea and vomiting.   Neurological:  Negative for dizziness, light-headedness and numbness.   All other systems reviewed and are negative.        Current Outpatient Medications:     Acetylcysteine capsule capsule, Take 1 capsule by mouth 2 (Two) Times a Day., Disp: 60 capsule, Rfl: 0    albuterol sulfate  (90 Base) MCG/ACT inhaler, Inhale 2 puffs Every 4 (Four) Hours As Needed for Wheezing., Disp: , Rfl:     ascorbic acid (VITAMIN C) 500 MG tablet, Take 1 tablet by mouth Daily., Disp: 30 tablet, Rfl: 0    cholecalciferol (VITAMIN D3) 25 MCG (1000 UT) tablet, Take 1 tablet by mouth Daily., Disp: 30 tablet, Rfl: 0    dexamethasone (DECADRON) 1 MG tablet, Take 4 tablets by mouth daily for 4 days, then 3 tabs daily  for 4 days, then 2 tabs daily for 4 days, then 1 tab daily for 4 days., Disp: 40 tablet, Rfl: 0    dilTIAZem CD (CARDIZEM CD) 240 MG 24 hr capsule, TAKE ONE CAPSULE BY MOUTH DAILY, Disp: 30 capsule, Rfl: 5    docusate sodium 100 MG capsule, Take 1 capsule by mouth 2 (Two) Times a Day., Disp: 60 capsule, Rfl: 0    FLUoxetine (PROzac) 40 MG capsule, Take 1 capsule by mouth Daily., Disp: , Rfl:     fluticasone-salmeterol (ADVAIR) 250-50 MCG/DOSE DISKUS, Inhale 1 puff 2 (two) times a day., Disp: , Rfl:     furosemide (Lasix) 20 MG tablet, Take 1 tablet by mouth Daily., Disp: 30 tablet, Rfl: 0    guaiFENesin (MUCINEX) 600 MG 12 hr tablet, Take 1 tablet by mouth Every 12 (Twelve) Hours., Disp: 60 tablet, Rfl: 0    levothyroxine (SYNTHROID, LEVOTHROID) 112 MCG tablet, Take 1 tablet by mouth Daily., Disp: , Rfl:     montelukast (SINGULAIR) 10 MG tablet, Take 1 tablet by mouth Every Night., Disp: , Rfl:     omeprazole (priLOSEC) 40 MG capsule, Take 1 capsule by mouth Daily., Disp: , Rfl:     ondansetron (ZOFRAN) 8 MG tablet, Take 1 tablet by mouth 4 (Four) Times a Day As Needed for Nausea or Vomiting., Disp: , Rfl:     polyethylene glycol (MIRALAX) 17 GM/SCOOP powder, Take 17 g by mouth Daily., Disp: 238 g, Rfl: 0    rivaroxaban (XARELTO) 20 MG tablet, Take 1 tablet by mouth Daily With Dinner. Indications: DVT/PE (active thrombosis), Disp: 30 tablet, Rfl: 1    traZODone (DESYREL) 50 MG tablet, Take 1 tablet by mouth Every Night., Disp: , Rfl:     zinc sulfate (ZINCATE) 220 (50 Zn) MG capsule, Take 1 capsule by mouth Daily., Disp: 30 capsule, Rfl: 0    Allergies   Allergen Reactions    Clarithromycin Itching       Family History   Problem Relation Age of Onset    Heart disease Paternal Grandmother     Heart disease Paternal Grandfather     Stroke Other        Past Surgical History:   Procedure Laterality Date    DIAGNOSTIC LAPAROSCOPY      OVARIAN CYST DRAINAGE         Past Medical History:   Diagnosis Date    Anxiety      "Asthma     CKD (chronic kidney disease) stage 3, GFR 30-59 ml/min     COVID-19 11/16/2020    Endometriosis     GERD (gastroesophageal reflux disease)     Hypertension     Hypothyroidism     Obesity     Ovarian cyst        Family History   Problem Relation Age of Onset    Heart disease Paternal Grandmother     Heart disease Paternal Grandfather     Stroke Other        Social History     Socioeconomic History    Marital status:    Tobacco Use    Smoking status: Never     Passive exposure: Never    Smokeless tobacco: Never   Vaping Use    Vaping Use: Never used   Substance and Sexual Activity    Alcohol use: Yes     Alcohol/week: 4.0 standard drinks of alcohol     Types: 4 Standard drinks or equivalent per week     Comment: 1 shot of liquor nightly    Drug use: Never    Sexual activity: Defer           ECG 12 Lead    Date/Time: 11/13/2023 12:54 PM  Performed by: Jsoee Martino MD    Authorized by: Josee Martino MD  Comparison: compared with previous ECG   Similar to previous ECG  Comparison to previous ECG: Normal sinus rhythm nonspecific ST-T wave changes 70/min normal axis normal intervals no ectopy no significant change from previous EKG.        Objective:       Physical Exam    /94 (BP Location: Left arm, Patient Position: Sitting, Cuff Size: Large Adult)   Pulse 71   Ht 167.6 cm (66\")   Wt 115 kg (254 lb)   LMP  (LMP Unknown)   SpO2 96% Comment: RA  BMI 41.00 kg/m²   The patient is alert, oriented and in no distress.    Vital signs as noted above.  Exogenous obesity (BMI 41)    Head and neck revealed no carotid bruits or jugular venous distension.  No thyromegaly or lymphadenopathy is present.    Lungs clear.  No wheezing.  Breath sounds are normal bilaterally.    Heart normal first and second heart sounds.  No murmur..  No pericardial rub is present.  No gallop is present.    Abdomen soft and nontender.  No organomegaly is present.    Extremities revealed good peripheral pulses without " any pedal edema.    Skin warm and dry.    Musculoskeletal system is grossly normal.    CNS grossly normal.    Reviewed and updated.

## 2023-11-13 ENCOUNTER — OFFICE VISIT (OUTPATIENT)
Dept: CARDIOLOGY | Facility: CLINIC | Age: 58
End: 2023-11-13
Payer: COMMERCIAL

## 2023-11-13 VITALS
HEART RATE: 71 BPM | WEIGHT: 254 LBS | SYSTOLIC BLOOD PRESSURE: 175 MMHG | OXYGEN SATURATION: 96 % | BODY MASS INDEX: 40.82 KG/M2 | HEIGHT: 66 IN | DIASTOLIC BLOOD PRESSURE: 94 MMHG

## 2023-11-13 DIAGNOSIS — Z79.01 CHRONIC ANTICOAGULATION: ICD-10-CM

## 2023-11-13 DIAGNOSIS — I48.0 PAROXYSMAL ATRIAL FIBRILLATION: Primary | ICD-10-CM

## 2023-11-13 DIAGNOSIS — R06.02 SHORTNESS OF BREATH: ICD-10-CM

## 2023-11-13 DIAGNOSIS — E78.5 DYSLIPIDEMIA: ICD-10-CM

## 2023-11-13 DIAGNOSIS — I10 ESSENTIAL HYPERTENSION: ICD-10-CM

## 2023-11-13 PROCEDURE — 99214 OFFICE O/P EST MOD 30 MIN: CPT | Performed by: INTERNAL MEDICINE

## 2023-11-13 PROCEDURE — 93000 ELECTROCARDIOGRAM COMPLETE: CPT | Performed by: INTERNAL MEDICINE

## 2024-04-08 RX ORDER — DILTIAZEM HYDROCHLORIDE 240 MG/1
240 CAPSULE, COATED, EXTENDED RELEASE ORAL DAILY
Qty: 90 CAPSULE | Refills: 3 | Status: SHIPPED | OUTPATIENT
Start: 2024-04-08

## 2024-04-08 NOTE — TELEPHONE ENCOUNTER
Rx Refill Note  Requested Prescriptions     Pending Prescriptions Disp Refills    dilTIAZem CD (CARDIZEM CD) 240 MG 24 hr capsule [Pharmacy Med Name: dilTIAZem 24H ER (CD) 240 MG CP] 90 capsule 3     Sig: TAKE 1 CAPSULE BY MOUTH DAILY      Last office visit with prescribing clinician: 11/13/2023   Last telemedicine visit with prescribing clinician: Visit date not found   Next office visit with prescribing clinician: 5/20/2024                         Would you like a call back once the refill request has been completed: [] Yes [] No    If the office needs to give you a call back, can they leave a voicemail: [] Yes [] No    Marjorie David MA  04/08/24, 07:56 EDT

## 2024-05-05 NOTE — PROGRESS NOTES
Encounter Date:05/20/2024    Last seen-11/13/2023      Patient ID: Danita Montiel is a 59 y.o. female.    Chief Complaint:     Atrial fibrillation with RVR  Hypertension  CKD 3  History of Covid 19  Anticoagulation     History of Present Illness     Since I have last seen, the patient has been without any chest discomfort ,shortness of breath, palpitations, dizziness or syncope.  Denies having any headache ,abdominal pain ,nausea, vomiting , diarrhea constipation, loss of weight or loss of appetite.  Denies having any excessive bruising ,hematuria or blood in the stool.    Review of all systems negative except as indicated.    Reviewed ROS.  Assessment and Plan      ]]]]]]]]]]]]]]]]  History  =====  -History of atrial fibrillation with RVR in the setting of COVID-19 infection pneumonia  Patient has converted to sinus rhythm.  EKG-sinus rhythm-5/8/2023  EKG-sinus rhythm-5/20/2024     Echocardiogram 11 June 2021   Left atrial enlargement.  Structurally and functionally normal cardiac valves.  Left ventricular size and contractility is normal with ejection fraction of 60%.  Concentric left ventricle hypertrophy.     -History of COVID-19 pneumonia.  Acute hypoxic respiratory failure-improved     -Hypertension CKD 3 dyslipidemia hypothyroidism GERD     -History of pulmonary embolus/DVT     -Anticoagulation-on Xarelto     -Family history of coronary artery disease     -Non-smoker     -Allergic to erythromycin  ===========  Plan  ==========  History of atrial fibrillation in the setting of COVID-19 pneumonia.  Patient has converted and is maintaining sinus rhythm.     EKG 5/8/2023-sinus rhythm  EKG 11/13/2023-sinus rhythm  EKG 5/20/2024-sinus rhythm   Continue Cardizem  mg a day and metoprolol XL 25 mg twice daily..    Anticoagulation status reviewed.  Continue Xarelto.     Hypertension-155/84  Mild elevation of systolic blood pressure.  Patient is on Cardizem and valsartan hydrochlorothiazide.  Continue metoprolol  XL to 25 mg twice daily.  Maintain blood pressure log.  Low-salt diet.  Reduction of weight.  Daily walking.     Hypothyroidism-on levothyroxine     Medications were reviewed and updated.     Follow-up in the office in 6 months with EKG.     Further plan will depend on patient's progress.     Reviewed and updated-5/20/2024.  ]]]]]]]]]]]]]]]]]]]            Diagnosis Plan   1. Paroxysmal atrial fibrillation        2. Chronic anticoagulation        3. Essential hypertension        4. Shortness of breath        5. Dyslipidemia        LAB RESULTS (LAST 7 DAYS)    CBC        BMP        CMP         BNP        TROPONIN        CoAg        Creatinine Clearance  CrCl cannot be calculated (Patient's most recent lab result is older than the maximum 30 days allowed.).    ABG        Radiology  No radiology results for the last day                The following portions of the patient's history were reviewed and updated as appropriate: allergies, current medications, past family history, past medical history, past social history, past surgical history, and problem list.    Review of Systems   Constitutional: Positive for malaise/fatigue.   Cardiovascular:  Positive for palpitations. Negative for chest pain, dyspnea on exertion and leg swelling.   Respiratory:  Positive for shortness of breath. Negative for cough.    Gastrointestinal:  Negative for abdominal pain, nausea and vomiting.   Neurological:  Negative for dizziness, focal weakness, headaches, light-headedness and numbness.   All other systems reviewed and are negative.      Current Outpatient Medications:   •  Acetylcysteine capsule capsule, Take 1 capsule by mouth 2 (Two) Times a Day., Disp: 60 capsule, Rfl: 0  •  albuterol sulfate  (90 Base) MCG/ACT inhaler, Inhale 2 puffs Every 4 (Four) Hours As Needed for Wheezing., Disp: , Rfl:   •  ascorbic acid (VITAMIN C) 500 MG tablet, Take 1 tablet by mouth Daily., Disp: 30 tablet, Rfl: 0  •  cholecalciferol (VITAMIN D3) 25  MCG (1000 UT) tablet, Take 1 tablet by mouth Daily., Disp: 30 tablet, Rfl: 0  •  dexamethasone (DECADRON) 1 MG tablet, Take 4 tablets by mouth daily for 4 days, then 3 tabs daily for 4 days, then 2 tabs daily for 4 days, then 1 tab daily for 4 days., Disp: 40 tablet, Rfl: 0  •  dilTIAZem CD (CARDIZEM CD) 240 MG 24 hr capsule, TAKE 1 CAPSULE BY MOUTH DAILY, Disp: 90 capsule, Rfl: 3  •  docusate sodium 100 MG capsule, Take 1 capsule by mouth 2 (Two) Times a Day., Disp: 60 capsule, Rfl: 0  •  FLUoxetine (PROzac) 40 MG capsule, Take 1 capsule by mouth Daily., Disp: , Rfl:   •  fluticasone-salmeterol (ADVAIR) 250-50 MCG/DOSE DISKUS, Inhale 1 puff 2 (two) times a day., Disp: , Rfl:   •  furosemide (Lasix) 20 MG tablet, Take 1 tablet by mouth Daily., Disp: 30 tablet, Rfl: 0  •  guaiFENesin (MUCINEX) 600 MG 12 hr tablet, Take 1 tablet by mouth Every 12 (Twelve) Hours., Disp: 60 tablet, Rfl: 0  •  levothyroxine (SYNTHROID, LEVOTHROID) 112 MCG tablet, Take 1 tablet by mouth Daily., Disp: , Rfl:   •  montelukast (SINGULAIR) 10 MG tablet, Take 1 tablet by mouth Every Night., Disp: , Rfl:   •  omeprazole (priLOSEC) 40 MG capsule, Take 1 capsule by mouth Daily., Disp: , Rfl:   •  ondansetron (ZOFRAN) 8 MG tablet, Take 1 tablet by mouth 4 (Four) Times a Day As Needed for Nausea or Vomiting., Disp: , Rfl:   •  polyethylene glycol (MIRALAX) 17 GM/SCOOP powder, Take 17 g by mouth Daily., Disp: 238 g, Rfl: 0  •  rivaroxaban (XARELTO) 20 MG tablet, Take 1 tablet by mouth Daily With Dinner. Indications: DVT/PE (active thrombosis), Disp: 30 tablet, Rfl: 1  •  traZODone (DESYREL) 50 MG tablet, Take 1 tablet by mouth Every Night., Disp: , Rfl:   •  zinc sulfate (ZINCATE) 220 (50 Zn) MG capsule, Take 1 capsule by mouth Daily., Disp: 30 capsule, Rfl: 0    Allergies   Allergen Reactions   • Clarithromycin Itching       Family History   Problem Relation Age of Onset   • Heart disease Paternal Grandmother    • Heart disease Paternal Grandfather     • Stroke Other        Past Surgical History:   Procedure Laterality Date   • DIAGNOSTIC LAPAROSCOPY     • OVARIAN CYST DRAINAGE         Past Medical History:   Diagnosis Date   • Anxiety    • Asthma    • CKD (chronic kidney disease) stage 3, GFR 30-59 ml/min    • COVID-19 11/16/2020   • Endometriosis    • GERD (gastroesophageal reflux disease)    • Hypertension    • Hypothyroidism    • Obesity    • Ovarian cyst        Family History   Problem Relation Age of Onset   • Heart disease Paternal Grandmother    • Heart disease Paternal Grandfather    • Stroke Other        Social History     Socioeconomic History   • Marital status:    Tobacco Use   • Smoking status: Never     Passive exposure: Never   • Smokeless tobacco: Never   Vaping Use   • Vaping status: Never Used   Substance and Sexual Activity   • Alcohol use: Yes     Alcohol/week: 4.0 standard drinks of alcohol     Types: 4 Standard drinks or equivalent per week     Comment: 1 shot of liquor nightly   • Drug use: Never   • Sexual activity: Defer           ECG 12 Lead    Date/Time: 5/20/2024 1:28 PM  Performed by: Josee Martino MD    Authorized by: Josee Martino MD  Comparison: compared with previous ECG   Similar to previous ECG  Comparison to previous ECG: Normal sinus rhythm 69/min normal axis normal intervals no ectopy no significant change from previous EKG.        Objective:       Physical Exam    LMP  (LMP Unknown)   The patient is alert, oriented and in no distress.    Vital signs as noted above.  Exogenous obesity (BMI 41)    Head and neck revealed no carotid bruits or jugular venous distension.  No thyromegaly or lymphadenopathy is present.    Lungs clear.  No wheezing.  Breath sounds are normal bilaterally.    Heart normal first and second heart sounds.  No murmur..  No pericardial rub is present.  No gallop is present.    Abdomen soft and nontender.  No organomegaly is present.    Extremities revealed good peripheral pulses without  any pedal edema.    Skin warm and dry.    Musculoskeletal system is grossly normal.    CNS grossly normal.    Reviewed and updated.

## 2024-05-20 ENCOUNTER — OFFICE VISIT (OUTPATIENT)
Dept: CARDIOLOGY | Facility: CLINIC | Age: 59
End: 2024-05-20
Payer: COMMERCIAL

## 2024-05-20 VITALS
WEIGHT: 255 LBS | OXYGEN SATURATION: 97 % | BODY MASS INDEX: 40.98 KG/M2 | SYSTOLIC BLOOD PRESSURE: 155 MMHG | HEART RATE: 69 BPM | HEIGHT: 66 IN | DIASTOLIC BLOOD PRESSURE: 84 MMHG

## 2024-05-20 DIAGNOSIS — Z79.01 CHRONIC ANTICOAGULATION: ICD-10-CM

## 2024-05-20 DIAGNOSIS — E78.5 DYSLIPIDEMIA: ICD-10-CM

## 2024-05-20 DIAGNOSIS — I10 ESSENTIAL HYPERTENSION: ICD-10-CM

## 2024-05-20 DIAGNOSIS — R06.02 SHORTNESS OF BREATH: ICD-10-CM

## 2024-05-20 DIAGNOSIS — I48.0 PAROXYSMAL ATRIAL FIBRILLATION: Primary | ICD-10-CM

## 2024-06-27 ENCOUNTER — OFFICE (OUTPATIENT)
Dept: URBAN - METROPOLITAN AREA CLINIC 64 | Facility: CLINIC | Age: 59
End: 2024-06-27

## 2024-06-27 VITALS
HEIGHT: 66 IN | HEART RATE: 65 BPM | DIASTOLIC BLOOD PRESSURE: 63 MMHG | SYSTOLIC BLOOD PRESSURE: 112 MMHG | WEIGHT: 253 LBS

## 2024-06-27 DIAGNOSIS — R11.2 NAUSEA WITH VOMITING, UNSPECIFIED: ICD-10-CM

## 2024-06-27 DIAGNOSIS — R74.8 ABNORMAL LEVELS OF OTHER SERUM ENZYMES: ICD-10-CM

## 2024-06-27 DIAGNOSIS — Z79.01 LONG TERM (CURRENT) USE OF ANTICOAGULANTS: ICD-10-CM

## 2024-06-27 DIAGNOSIS — R10.13 EPIGASTRIC PAIN: ICD-10-CM

## 2024-06-27 DIAGNOSIS — R19.4 CHANGE IN BOWEL HABIT: ICD-10-CM

## 2024-06-27 PROCEDURE — 99204 OFFICE O/P NEW MOD 45 MIN: CPT | Performed by: INTERNAL MEDICINE

## 2024-06-27 RX ORDER — COLESEVELAM HYDROCHLORIDE 625 MG/1
TABLET, COATED ORAL
Qty: 60 | Refills: 11 | Status: ACTIVE
Start: 2024-06-27

## 2024-06-27 RX ORDER — ONDANSETRON HYDROCHLORIDE 4 MG/1
TABLET, FILM COATED ORAL
Qty: 45 | Refills: 5 | Status: ACTIVE
Start: 2024-06-27

## 2024-08-12 ENCOUNTER — TELEPHONE (OUTPATIENT)
Dept: CARDIOLOGY | Facility: CLINIC | Age: 59
End: 2024-08-12
Payer: COMMERCIAL

## 2024-08-12 NOTE — TELEPHONE ENCOUNTER
FACILITY: gastroenterology   DR: Maxine  PHONE: [phone]  FAX: [phone]  PROCEDURE: EGD/Colonoscopy  SCHEDULED: 09/19/24  MEDS TO HOLD: Xarelto

## 2024-09-16 RX ORDER — FEXOFENADINE HCL 180 MG/1
180 TABLET ORAL DAILY
COMMUNITY

## 2024-09-16 RX ORDER — FAMOTIDINE 40 MG/1
40 TABLET, FILM COATED ORAL DAILY
COMMUNITY

## 2024-09-16 RX ORDER — ONDANSETRON 4 MG/1
4 TABLET, FILM COATED ORAL EVERY 8 HOURS PRN
COMMUNITY

## 2024-09-16 RX ORDER — HYDRALAZINE HYDROCHLORIDE 50 MG/1
50 TABLET, FILM COATED ORAL 3 TIMES DAILY
COMMUNITY

## 2024-09-16 RX ORDER — VALSARTAN 160 MG/1
160 TABLET ORAL DAILY
COMMUNITY

## 2024-09-16 RX ORDER — COLESEVELAM 180 1/1
1250 TABLET ORAL 2 TIMES DAILY WITH MEALS
COMMUNITY

## 2024-09-16 RX ORDER — PANTOPRAZOLE SODIUM 40 MG/1
40 TABLET, DELAYED RELEASE ORAL 2 TIMES DAILY
COMMUNITY

## 2024-09-16 RX ORDER — AMLODIPINE BESYLATE 5 MG/1
5 TABLET ORAL DAILY
COMMUNITY

## 2024-09-17 RX ORDER — METOPROLOL SUCCINATE 25 MG/1
25 TABLET, EXTENDED RELEASE ORAL 2 TIMES DAILY
COMMUNITY

## 2024-09-18 PROBLEM — R19.8 ALTERED BOWEL FUNCTION: Status: ACTIVE | Noted: 2024-09-18

## 2024-09-19 ENCOUNTER — ON CAMPUS - OUTPATIENT (OUTPATIENT)
Dept: URBAN - METROPOLITAN AREA HOSPITAL 85 | Facility: HOSPITAL | Age: 59
End: 2024-09-19
Payer: COMMERCIAL

## 2024-09-19 ENCOUNTER — ON CAMPUS - OUTPATIENT (OUTPATIENT)
Age: 59
End: 2024-09-19
Payer: COMMERCIAL

## 2024-09-19 ENCOUNTER — ANESTHESIA EVENT (OUTPATIENT)
Dept: GASTROENTEROLOGY | Facility: HOSPITAL | Age: 59
End: 2024-09-19
Payer: COMMERCIAL

## 2024-09-19 ENCOUNTER — HOSPITAL ENCOUNTER (OUTPATIENT)
Facility: HOSPITAL | Age: 59
Setting detail: HOSPITAL OUTPATIENT SURGERY
Discharge: HOME | End: 2024-09-19
Attending: INTERNAL MEDICINE | Admitting: INTERNAL MEDICINE
Payer: COMMERCIAL

## 2024-09-19 ENCOUNTER — ANESTHESIA (OUTPATIENT)
Dept: GASTROENTEROLOGY | Facility: HOSPITAL | Age: 59
End: 2024-09-19
Payer: COMMERCIAL

## 2024-09-19 VITALS
HEART RATE: 65 BPM | DIASTOLIC BLOOD PRESSURE: 60 MMHG | OXYGEN SATURATION: 97 % | SYSTOLIC BLOOD PRESSURE: 116 MMHG | HEIGHT: 66 IN | TEMPERATURE: 98.9 F | WEIGHT: 255.4 LBS | RESPIRATION RATE: 20 BRPM | BODY MASS INDEX: 41.05 KG/M2

## 2024-09-19 DIAGNOSIS — D12.2 BENIGN NEOPLASM OF ASCENDING COLON: ICD-10-CM

## 2024-09-19 DIAGNOSIS — R11.2 NAUSEA WITH VOMITING, UNSPECIFIED: ICD-10-CM

## 2024-09-19 DIAGNOSIS — R19.4 CHANGE IN BOWEL HABIT: ICD-10-CM

## 2024-09-19 DIAGNOSIS — K57.30 DIVERTICULOSIS OF LARGE INTESTINE WITHOUT PERFORATION OR ABS: ICD-10-CM

## 2024-09-19 DIAGNOSIS — K29.50 UNSPECIFIED CHRONIC GASTRITIS WITHOUT BLEEDING: ICD-10-CM

## 2024-09-19 DIAGNOSIS — R15.9 FULL INCONTINENCE OF FECES: ICD-10-CM

## 2024-09-19 DIAGNOSIS — R11.2 NAUSEA AND VOMITING: ICD-10-CM

## 2024-09-19 DIAGNOSIS — R10.13 EPIGASTRIC PAIN: ICD-10-CM

## 2024-09-19 DIAGNOSIS — R19.8 ALTERED BOWEL FUNCTION: ICD-10-CM

## 2024-09-19 PROCEDURE — 45380 COLONOSCOPY AND BIOPSY: CPT | Mod: 59 | Performed by: INTERNAL MEDICINE

## 2024-09-19 PROCEDURE — 43239 EGD BIOPSY SINGLE/MULTIPLE: CPT | Performed by: INTERNAL MEDICINE

## 2024-09-19 PROCEDURE — 45385 COLONOSCOPY W/LESION REMOVAL: CPT | Performed by: INTERNAL MEDICINE

## 2024-09-19 PROCEDURE — 25010000002 PROPOFOL 10 MG/ML EMULSION

## 2024-09-19 PROCEDURE — 25810000003 SODIUM CHLORIDE 0.9 % SOLUTION: Performed by: INTERNAL MEDICINE

## 2024-09-19 PROCEDURE — 88305 TISSUE EXAM BY PATHOLOGIST: CPT | Performed by: INTERNAL MEDICINE

## 2024-09-19 RX ORDER — PROPOFOL 10 MG/ML
VIAL (ML) INTRAVENOUS AS NEEDED
Status: DISCONTINUED | OUTPATIENT
Start: 2024-09-19 | End: 2024-09-19 | Stop reason: SURG

## 2024-09-19 RX ORDER — ONDANSETRON 2 MG/ML
4 INJECTION INTRAMUSCULAR; INTRAVENOUS ONCE AS NEEDED
Status: DISCONTINUED | OUTPATIENT
Start: 2024-09-19 | End: 2024-09-19 | Stop reason: HOSPADM

## 2024-09-19 RX ORDER — LIDOCAINE HYDROCHLORIDE 20 MG/ML
INJECTION, SOLUTION EPIDURAL; INFILTRATION; INTRACAUDAL; PERINEURAL AS NEEDED
Status: DISCONTINUED | OUTPATIENT
Start: 2024-09-19 | End: 2024-09-19 | Stop reason: SURG

## 2024-09-19 RX ORDER — SODIUM CHLORIDE 9 MG/ML
50 INJECTION, SOLUTION INTRAVENOUS CONTINUOUS
Status: DISCONTINUED | OUTPATIENT
Start: 2024-09-19 | End: 2024-09-19 | Stop reason: HOSPADM

## 2024-09-19 RX ADMIN — PROPOFOL 40 MG: 10 INJECTION, EMULSION INTRAVENOUS at 11:13

## 2024-09-19 RX ADMIN — PROPOFOL 30 MG: 10 INJECTION, EMULSION INTRAVENOUS at 11:04

## 2024-09-19 RX ADMIN — PROPOFOL 100 MG: 10 INJECTION, EMULSION INTRAVENOUS at 10:50

## 2024-09-19 RX ADMIN — PROPOFOL 40 MG: 10 INJECTION, EMULSION INTRAVENOUS at 10:54

## 2024-09-19 RX ADMIN — SODIUM CHLORIDE 50 ML/HR: 9 INJECTION, SOLUTION INTRAVENOUS at 09:44

## 2024-09-19 RX ADMIN — PROPOFOL 20 MG: 10 INJECTION, EMULSION INTRAVENOUS at 10:46

## 2024-09-19 RX ADMIN — PROPOFOL 50 MG: 10 INJECTION, EMULSION INTRAVENOUS at 11:11

## 2024-09-19 RX ADMIN — PROPOFOL 40 MG: 10 INJECTION, EMULSION INTRAVENOUS at 10:47

## 2024-09-19 RX ADMIN — LIDOCAINE HYDROCHLORIDE 60 MG: 20 INJECTION, SOLUTION EPIDURAL; INFILTRATION; INTRACAUDAL; PERINEURAL at 10:42

## 2024-09-19 RX ADMIN — PROPOFOL 40 MG: 10 INJECTION, EMULSION INTRAVENOUS at 10:56

## 2024-09-19 RX ADMIN — PROPOFOL 50 MG: 10 INJECTION, EMULSION INTRAVENOUS at 10:49

## 2024-09-19 RX ADMIN — PROPOFOL 30 MG: 10 INJECTION, EMULSION INTRAVENOUS at 11:00

## 2024-09-19 RX ADMIN — PROPOFOL 50 MG: 10 INJECTION, EMULSION INTRAVENOUS at 10:48

## 2024-09-19 RX ADMIN — PROPOFOL 40 MG: 10 INJECTION, EMULSION INTRAVENOUS at 10:52

## 2024-09-19 RX ADMIN — PROPOFOL 50 MG: 10 INJECTION, EMULSION INTRAVENOUS at 11:07

## 2024-09-19 RX ADMIN — PROPOFOL 100 MG: 10 INJECTION, EMULSION INTRAVENOUS at 10:42

## 2024-09-19 RX ADMIN — PROPOFOL 40 MG: 10 INJECTION, EMULSION INTRAVENOUS at 11:02

## 2024-09-19 RX ADMIN — PROPOFOL 50 MG: 10 INJECTION, EMULSION INTRAVENOUS at 11:09

## 2024-09-19 RX ADMIN — PROPOFOL 80 MG: 10 INJECTION, EMULSION INTRAVENOUS at 10:58

## 2024-09-19 RX ADMIN — PROPOFOL 40 MG: 10 INJECTION, EMULSION INTRAVENOUS at 10:44

## 2024-09-20 LAB
LAB AP CASE REPORT: NORMAL
PATH REPORT.FINAL DX SPEC: NORMAL
PATH REPORT.GROSS SPEC: NORMAL

## 2025-01-18 NOTE — PROGRESS NOTES
Encounter Date:01/24/2025    Last seen-5/20/2024      Patient ID: Danita Montiel is a 59 y.o. female.      Chief Complaint:     Atrial fibrillation with RVR  Hypertension  CKD 3  History of Covid 19  Anticoagulation     History of Present Illness     Since I have last seen, the patient has been without any chest discomfort ,shortness of breath, palpitations, dizziness or syncope.  Denies having any headache ,abdominal pain ,nausea, vomiting , diarrhea constipation, loss of weight or loss of appetite.  Denies having any excessive bruising ,hematuria or blood in the stool.    Review of all systems negative except as indicated.    Reviewed ROS.    Assessment and Plan      ]]]]]]]]]]]]]]]]  History  =====  -History of atrial fibrillation with RVR in the setting of COVID-19 infection pneumonia  Patient has converted to sinus rhythm.  EKG-sinus rhythm-5/8/2023  EKG-sinus rhythm-5/20/2024     Echocardiogram 11 June 2021   Left atrial enlargement.  Structurally and functionally normal cardiac valves.  Left ventricular size and contractility is normal with ejection fraction of 60%.  Concentric left ventricle hypertrophy.     -History of COVID-19 pneumonia.  Acute hypoxic respiratory failure-improved     -Hypertension CKD 3 dyslipidemia hypothyroidism GERD     -History of pulmonary embolus/DVT     -Anticoagulation-on Xarelto     -Family history of coronary artery disease     -Non-smoker     -Allergic to erythromycin  ===========  Plan  ==========  History of atrial fibrillation in the setting of COVID-19 pneumonia.  Patient has converted and maintaining sinus rhythm.     EKG 5/8/2023-sinus rhythm  EKG 11/13/2023-sinus rhythm  EKG 5/20/2024-sinus rhythm  EKG 1/24/2025-sinus rhythm without ischemic changes.     Anticoagulation status was reviewed.  Continue Xarelto.    Hypertension-well-controlled  132/75.  Continue Cardizem and valsartan hydrochlorothiazide.  Continue metoprolol XL to 25 mg twice daily.    Hypothyroidism-on  levothyroxine    Medications were reviewed and updated.     Follow-up in the office in 6 months.    Further plan will depend on patient's progress.     Reviewed and updated-1/24/2025.  ]]]]]]]]]]]]]]]]]]]             Diagnosis Plan   1. Paroxysmal atrial fibrillation        2. Chronic anticoagulation        3. Essential hypertension        4. Dyslipidemia        5. Shortness of breath        LAB RESULTS (LAST 7 DAYS)    CBC        BMP        CMP         BNP        TROPONIN        CoAg        Creatinine Clearance  CrCl cannot be calculated (Patient's most recent lab result is older than the maximum 30 days allowed.).    ABG        Radiology  No radiology results for the last day                The following portions of the patient's history were reviewed and updated as appropriate: allergies, current medications, past family history, past medical history, past social history, past surgical history, and problem list.    Review of Systems   Constitutional: Negative for malaise/fatigue.   Cardiovascular:  Positive for leg swelling. Negative for chest pain, dyspnea on exertion and palpitations.   Respiratory:  Positive for shortness of breath. Negative for cough.    Gastrointestinal:  Negative for abdominal pain, nausea and vomiting.   Neurological:  Negative for dizziness, focal weakness, headaches, light-headedness and numbness.   All other systems reviewed and are negative.      Current Outpatient Medications:     Acetylcysteine capsule capsule, Take 1 capsule by mouth 2 (Two) Times a Day., Disp: 60 capsule, Rfl: 0    albuterol sulfate  (90 Base) MCG/ACT inhaler, Inhale 2 puffs Every 4 (Four) Hours As Needed for Wheezing., Disp: , Rfl:     amLODIPine (NORVASC) 5 MG tablet, Take 1 tablet by mouth Daily., Disp: , Rfl:     ascorbic acid (VITAMIN C) 500 MG tablet, Take 1 tablet by mouth Daily. (Patient not taking: Reported on 5/20/2024), Disp: 30 tablet, Rfl: 0    cholecalciferol (VITAMIN D3) 25 MCG (1000 UT)  tablet, Take 1 tablet by mouth Daily., Disp: 30 tablet, Rfl: 0    colesevelam (WELCHOL) 625 MG tablet, Take 2 tablets by mouth 2 (Two) Times a Day With Meals., Disp: , Rfl:     dilTIAZem CD (CARDIZEM CD) 240 MG 24 hr capsule, TAKE 1 CAPSULE BY MOUTH DAILY, Disp: 90 capsule, Rfl: 3    docusate sodium 100 MG capsule, Take 1 capsule by mouth 2 (Two) Times a Day., Disp: 60 capsule, Rfl: 0    famotidine (PEPCID) 40 MG tablet, Take 1 tablet by mouth Daily., Disp: , Rfl:     fexofenadine (ALLEGRA) 180 MG tablet, Take 1 tablet by mouth Daily., Disp: , Rfl:     FLUoxetine (PROzac) 40 MG capsule, Take 1 capsule by mouth Daily., Disp: , Rfl:     fluticasone-salmeterol (ADVAIR) 250-50 MCG/DOSE DISKUS, Inhale 1 puff 2 (two) times a day., Disp: , Rfl:     furosemide (Lasix) 20 MG tablet, Take 1 tablet by mouth Daily., Disp: 30 tablet, Rfl: 0    guaiFENesin (MUCINEX) 600 MG 12 hr tablet, Take 1 tablet by mouth Every 12 (Twelve) Hours. (Patient not taking: Reported on 5/20/2024), Disp: 60 tablet, Rfl: 0    hydrALAZINE (APRESOLINE) 50 MG tablet, Take 1 tablet by mouth 3 (Three) Times a Day., Disp: , Rfl:     levothyroxine (SYNTHROID, LEVOTHROID) 112 MCG tablet, Take 1 tablet by mouth Daily., Disp: , Rfl:     metoprolol succinate XL (TOPROL-XL) 25 MG 24 hr tablet, Take 1 tablet by mouth 2 (Two) Times a Day., Disp: , Rfl:     montelukast (SINGULAIR) 10 MG tablet, Take 1 tablet by mouth Every Night., Disp: , Rfl:     ondansetron (ZOFRAN) 4 MG tablet, Take 1 tablet by mouth Every 8 (Eight) Hours As Needed for Nausea or Vomiting., Disp: , Rfl:     pantoprazole (PROTONIX) 40 MG EC tablet, Take 1 tablet by mouth 2 (Two) Times a Day., Disp: , Rfl:     polyethylene glycol (MIRALAX) 17 GM/SCOOP powder, Take 17 g by mouth Daily., Disp: 238 g, Rfl: 0    rivaroxaban (XARELTO) 20 MG tablet, Take 1 tablet by mouth Daily With Dinner. Indications: DVT/PE (active thrombosis), Disp: 30 tablet, Rfl: 1    traZODone (DESYREL) 50 MG tablet, Take 2 tablets  by mouth Every Night., Disp: , Rfl:     valsartan (DIOVAN) 160 MG tablet, Take 1 tablet by mouth Daily., Disp: , Rfl:     zinc sulfate (ZINCATE) 220 (50 Zn) MG capsule, Take 1 capsule by mouth Daily., Disp: 30 capsule, Rfl: 0    Allergies   Allergen Reactions    Clarithromycin Itching       Family History   Problem Relation Age of Onset    Heart disease Paternal Grandmother     Heart disease Paternal Grandfather     Stroke Other        Past Surgical History:   Procedure Laterality Date    COLONOSCOPY N/A 9/19/2024    Procedure: COLONOSCOPY forcep biopsies of whole colon to rule out microscopic colitis,cold snare polypectomy x1;  Surgeon: Brennen Hayes MD;  Location: [facility];  Service: Gastroenterology;  Laterality: N/A;  diverticulosis,colon polyps    DIAGNOSTIC LAPAROSCOPY      ENDOSCOPY N/A 9/19/2024    Procedure: ESOPHAGOGASTRODUODENOSCOPY forcep gastric biopsy for gastritis;  Surgeon: Brennen Hayes MD;  Location: [facility];  Service: Gastroenterology;  Laterality: N/A;  gastritis,gastric polyps    OVARIAN CYST DRAINAGE         Past Medical History:   Diagnosis Date    Anxiety     Asthma     Back pain     CKD (chronic kidney disease) stage 3, GFR 30-59 ml/min     COVID-19 11/16/2020    Endometriosis     GERD (gastroesophageal reflux disease)     Hypertension     Hypothyroidism     Lymph edema     Obesity     Ovarian cyst        Family History   Problem Relation Age of Onset    Heart disease Paternal Grandmother     Heart disease Paternal Grandfather     Stroke Other        Social History     Socioeconomic History    Marital status:    Tobacco Use    Smoking status: Never     Passive exposure: Never    Smokeless tobacco: Never   Vaping Use    Vaping status: Never Used   Substance and Sexual Activity    Alcohol use: Yes     Alcohol/week: 4.0 standard drinks of alcohol     Types: 4 Standard drinks or equivalent per week     Comment: 1 shot of liquor nightly    Drug  use: Never    Sexual activity: Defer           ECG 12 Lead    Date/Time: 1/24/2025 11:51 AM  Performed by: Josee Martino MD    Authorized by: Josee Martino MD  Comparison: compared with previous ECG   Similar to previous ECG  Comparison to previous ECG: Sinus pericardia 54/min nonspecific ST-T wave abnormalities normal axis normal intervals no ectopy no significant change from previous EKG.        Objective:       Physical Exam    LMP  (LMP Unknown)   The patient is alert, oriented and in no distress.    Vital signs as noted above.    Head and neck revealed no carotid bruits or jugular venous distension.  No thyromegaly or lymphadenopathy is present.    Lungs clear.  No wheezing.  Breath sounds are normal bilaterally.    Heart normal first and second heart sounds.  No murmur..  No pericardial rub is present.  No gallop is present.    Abdomen soft and nontender.  No organomegaly is present.    Extremities revealed good peripheral pulses without any pedal edema.    Skin warm and dry.    Musculoskeletal system is grossly normal.    CNS grossly normal.    Reviewed and updated.

## 2025-01-24 ENCOUNTER — OFFICE VISIT (OUTPATIENT)
Dept: CARDIOLOGY | Facility: CLINIC | Age: 60
End: 2025-01-24
Payer: COMMERCIAL

## 2025-01-24 VITALS
SYSTOLIC BLOOD PRESSURE: 132 MMHG | OXYGEN SATURATION: 96 % | HEIGHT: 66 IN | BODY MASS INDEX: 39.53 KG/M2 | HEART RATE: 59 BPM | DIASTOLIC BLOOD PRESSURE: 75 MMHG | WEIGHT: 246 LBS

## 2025-01-24 DIAGNOSIS — I48.0 PAROXYSMAL ATRIAL FIBRILLATION: Primary | ICD-10-CM

## 2025-01-24 DIAGNOSIS — E78.5 DYSLIPIDEMIA: ICD-10-CM

## 2025-01-24 DIAGNOSIS — R06.02 SHORTNESS OF BREATH: ICD-10-CM

## 2025-01-24 DIAGNOSIS — Z79.01 CHRONIC ANTICOAGULATION: ICD-10-CM

## 2025-01-24 DIAGNOSIS — I10 ESSENTIAL HYPERTENSION: ICD-10-CM

## 2025-01-24 RX ORDER — ERGOCALCIFEROL 1.25 MG/1
1 CAPSULE, LIQUID FILLED ORAL WEEKLY
COMMUNITY
Start: 2024-12-11

## 2025-02-04 ENCOUNTER — TELEPHONE (OUTPATIENT)
Dept: CARDIOLOGY | Facility: CLINIC | Age: 60
End: 2025-02-04
Payer: COMMERCIAL

## 2025-02-04 RX ORDER — DILTIAZEM HYDROCHLORIDE 240 MG/1
240 CAPSULE, COATED, EXTENDED RELEASE ORAL DAILY
Qty: 90 CAPSULE | Refills: 3 | Status: SHIPPED | OUTPATIENT
Start: 2025-02-04

## 2025-02-04 NOTE — TELEPHONE ENCOUNTER
Rx Refill Note  Requested Prescriptions     Pending Prescriptions Disp Refills    dilTIAZem CD (CARDIZEM CD) 240 MG 24 hr capsule 90 capsule 3     Sig: Take 1 capsule by mouth Daily.      Last office visit with prescribing clinician: 1/24/2025   Last telemedicine visit with prescribing clinician: Visit date not found   Next office visit with prescribing clinician: 7/24/2025                         Would you like a call back once the refill request has been completed: [] Yes [] No    If the office needs to give you a call back, can they leave a voicemail: [] Yes [] No    Marjorie David MA  02/04/25, 16:43 EST

## 2025-02-04 NOTE — TELEPHONE ENCOUNTER
Patient called in and LM that she was needing her Diltiazem refilled. Needs it sent to [address]

## 2025-03-26 NOTE — PLAN OF CARE
Goal Outcome Evaluation:         Pt ABG results concerning, pt to remain without AVAPS if continues to tolerate.  Educated pt on rest and plan of care.          abdominal pain

## 2025-06-25 NOTE — TELEPHONE ENCOUNTER
Called patient again no answer.  
Left vm again  
Left vm to discuss abnormal reading on holter monitor.   
Never smoker

## (undated) DEVICE — TRAP WIDEEYE POLYP

## (undated) DEVICE — SINGLE-USE BIOPSY FORCEPS: Brand: RADIAL JAW 4

## (undated) DEVICE — PK ENDO GI 50

## (undated) DEVICE — SNAR POLYP HOTSNARE/BRAIDED OVL/MINI 7F 2.8X10MM 230CM 1P/U

## (undated) DEVICE — BITEBLOCK ENDO W/STRAP 60F A/ LF DISP